# Patient Record
Sex: FEMALE | Race: WHITE | Employment: OTHER | URBAN - METROPOLITAN AREA
[De-identification: names, ages, dates, MRNs, and addresses within clinical notes are randomized per-mention and may not be internally consistent; named-entity substitution may affect disease eponyms.]

---

## 2017-04-05 ENCOUNTER — HOSPITAL ENCOUNTER (OUTPATIENT)
Age: 82
Discharge: HOME | End: 2017-04-05
Attending: FAMILY MEDICINE
Payer: MEDICARE

## 2017-04-05 ENCOUNTER — APPOINTMENT (OUTPATIENT)
Dept: GENERAL RADIOLOGY | Age: 82
End: 2017-04-05
Attending: FAMILY MEDICINE
Payer: MEDICARE

## 2017-04-05 VITALS
HEART RATE: 63 BPM | OXYGEN SATURATION: 100 % | WEIGHT: 165 LBS | SYSTOLIC BLOOD PRESSURE: 167 MMHG | HEIGHT: 61.5 IN | RESPIRATION RATE: 12 BRPM | TEMPERATURE: 98 F | DIASTOLIC BLOOD PRESSURE: 53 MMHG | BODY MASS INDEX: 30.75 KG/M2

## 2017-04-05 DIAGNOSIS — J06.9 VIRAL UPPER RESPIRATORY TRACT INFECTION: ICD-10-CM

## 2017-04-05 DIAGNOSIS — K62.3 RECTAL PROLAPSE: ICD-10-CM

## 2017-04-05 DIAGNOSIS — K59.00 CONSTIPATION, UNSPECIFIED CONSTIPATION TYPE: Primary | ICD-10-CM

## 2017-04-05 DIAGNOSIS — K64.4 EXTERNAL HEMORRHOID: ICD-10-CM

## 2017-04-05 DIAGNOSIS — R10.30 LOWER ABDOMINAL PAIN: ICD-10-CM

## 2017-04-05 PROCEDURE — 87086 URINE CULTURE/COLONY COUNT: CPT | Performed by: FAMILY MEDICINE

## 2017-04-05 PROCEDURE — 81002 URINALYSIS NONAUTO W/O SCOPE: CPT

## 2017-04-05 PROCEDURE — 99204 OFFICE O/P NEW MOD 45 MIN: CPT

## 2017-04-05 PROCEDURE — 71020 XR CHEST PA + LAT CHEST (CPT=71020): CPT

## 2017-04-05 RX ORDER — ATENOLOL 25 MG/1
25 TABLET ORAL DAILY
COMMUNITY

## 2017-04-05 RX ORDER — AMOXICILLIN AND CLAVULANATE POTASSIUM 875; 125 MG/1; MG/1
1 TABLET, FILM COATED ORAL 2 TIMES DAILY
Status: ON HOLD | COMMUNITY
End: 2018-09-21

## 2017-04-05 RX ORDER — COVID-19 ANTIGEN TEST
220 KIT MISCELLANEOUS
Status: ON HOLD | COMMUNITY
End: 2018-09-21

## 2017-04-05 RX ORDER — HYDROCHLOROTHIAZIDE 12.5 MG/1
12.5 CAPSULE, GELATIN COATED ORAL DAILY
Status: ON HOLD | COMMUNITY
End: 2018-09-28

## 2017-04-05 RX ORDER — AMLODIPINE BESYLATE 5 MG/1
5 TABLET ORAL DAILY
COMMUNITY

## 2017-04-05 NOTE — ED PROVIDER NOTES
[facility]    History   Patient presents with:  Abdomen/Flank Pain (GI/)    Stated Complaint: **Lower Abdm Pressure** DR Aide Cruz    HPI  68-year-old female patient who is a retired primary care [facility] follow-up. Past Medical History   Diagnosis Date   • Essential hypertension            Past Surgical History    MASTECTOMY,SIMPLE      OOPHORECTOMY         Medications : AmLODIPine Besylate 5 MG Oral Tab,  Take 5 mg by mouth daily.    hydrochlorothiazi exhibits no distension and no mass. There is no tenderness. There is no rebound and no guarding. Vertical scar from pubis to navel c/w ooporectomy. Genitourinary: Rectal exam shows external hemorrhoid and internal hemorrhoid.  Rectal exam shows no fiss being treated with Augmentin. Culture was sent with the consideration that she might have a false negative given being on antibiotics for 2 days.      XR CHEST PA + LAT CHEST (CPT=71020) (Final result) Result time: 04/05/17 19:26:03     Final result by Titi days        Medications Prescribed:  Discharge Medication List as of 4/5/2017  7:32 PM

## 2018-09-20 ENCOUNTER — APPOINTMENT (OUTPATIENT)
Dept: MRI IMAGING | Facility: HOSPITAL | Age: 83
DRG: 516 | End: 2018-09-20
Attending: Other
Payer: MEDICARE

## 2018-09-20 ENCOUNTER — HOSPITAL ENCOUNTER (INPATIENT)
Facility: HOSPITAL | Age: 83
LOS: 8 days | Discharge: NURSING, REHAB OR LONG-TERM CARE | DRG: 516 | End: 2018-09-28
Attending: INTERNAL MEDICINE | Admitting: INTERNAL MEDICINE
Payer: MEDICARE

## 2018-09-20 DIAGNOSIS — M48.062 SPINAL STENOSIS OF LUMBAR REGION WITH NEUROGENIC CLAUDICATION: Primary | ICD-10-CM

## 2018-09-20 PROBLEM — G62.9 PERIPHERAL POLYNEUROPATHY: Status: ACTIVE | Noted: 2018-09-20

## 2018-09-20 PROBLEM — R74.8 ELEVATED LIVER ENZYMES: Status: ACTIVE | Noted: 2018-09-20

## 2018-09-20 PROBLEM — M21.371 BILATERAL FOOT-DROP: Status: ACTIVE | Noted: 2018-09-20

## 2018-09-20 PROBLEM — D05.10 DCIS (DUCTAL CARCINOMA IN SITU): Status: ACTIVE | Noted: 2018-09-20

## 2018-09-20 PROBLEM — R29.898 WEAKNESS OF BOTH LOWER EXTREMITIES: Status: ACTIVE | Noted: 2018-09-20

## 2018-09-20 PROBLEM — M21.372 BILATERAL FOOT-DROP: Status: ACTIVE | Noted: 2018-09-20

## 2018-09-20 PROBLEM — M54.50 LUMBAR PAIN: Status: ACTIVE | Noted: 2018-09-20

## 2018-09-20 PROBLEM — D64.9 LOW HEMOGLOBIN: Status: ACTIVE | Noted: 2018-09-20

## 2018-09-20 PROCEDURE — 72148 MRI LUMBAR SPINE W/O DYE: CPT | Performed by: OTHER

## 2018-09-20 PROCEDURE — 99223 1ST HOSP IP/OBS HIGH 75: CPT | Performed by: OTHER

## 2018-09-20 PROCEDURE — 99223 1ST HOSP IP/OBS HIGH 75: CPT | Performed by: INTERNAL MEDICINE

## 2018-09-20 PROCEDURE — 72146 MRI CHEST SPINE W/O DYE: CPT | Performed by: OTHER

## 2018-09-20 RX ORDER — POLYETHYLENE GLYCOL 3350 17 G/17G
17 POWDER, FOR SOLUTION ORAL DAILY
Status: DISCONTINUED | OUTPATIENT
Start: 2018-09-20 | End: 2018-09-28

## 2018-09-20 RX ORDER — AMLODIPINE BESYLATE 5 MG/1
5 TABLET ORAL DAILY
Status: DISCONTINUED | OUTPATIENT
Start: 2018-09-20 | End: 2018-09-20

## 2018-09-20 RX ORDER — SODIUM CHLORIDE 9 MG/ML
INJECTION, SOLUTION INTRAVENOUS CONTINUOUS
Status: DISCONTINUED | OUTPATIENT
Start: 2018-09-20 | End: 2018-09-21

## 2018-09-20 RX ORDER — ACETAMINOPHEN 500 MG
500 TABLET ORAL EVERY 6 HOURS PRN
Status: DISCONTINUED | OUTPATIENT
Start: 2018-09-20 | End: 2018-09-28

## 2018-09-20 RX ORDER — HYDROCHLOROTHIAZIDE 12.5 MG/1
12.5 CAPSULE, GELATIN COATED ORAL DAILY
Status: DISCONTINUED | OUTPATIENT
Start: 2018-09-20 | End: 2018-09-25

## 2018-09-20 RX ORDER — ATENOLOL 25 MG/1
25 TABLET ORAL DAILY
Status: DISCONTINUED | OUTPATIENT
Start: 2018-09-20 | End: 2018-09-28

## 2018-09-20 RX ORDER — LORAZEPAM 2 MG/ML
1 INJECTION INTRAMUSCULAR ONCE
Status: COMPLETED | OUTPATIENT
Start: 2018-09-20 | End: 2018-09-20

## 2018-09-20 RX ORDER — TRAMADOL HYDROCHLORIDE 50 MG/1
50 TABLET ORAL EVERY 12 HOURS PRN
Status: DISCONTINUED | OUTPATIENT
Start: 2018-09-20 | End: 2018-09-26

## 2018-09-20 RX ORDER — AMLODIPINE BESYLATE 5 MG/1
5 TABLET ORAL 2 TIMES DAILY
Status: DISCONTINUED | OUTPATIENT
Start: 2018-09-20 | End: 2018-09-28

## 2018-09-20 NOTE — CONSULTS
[facility]    Report of Consultation    Brielle Perez Patient Status:  Inpatient    1925 MRN [id number]   [facility] 3SW-A Attending Yamila Orantes MD   Hosp Day # 0 PCP No primary care provider on file.      Date of Admissi tab 500 mg, 500 mg, Oral, Q6H PRN  •  AmLODIPine Besylate (NORVASC) tab 5 mg, 5 mg, Oral, Daily  •  atenolol (TENORMIN) tab 25 mg, 25 mg, Oral, Daily  •  hydrochlorothiazide (MICROZIDE) cap 12.5 mg, 12.5 mg, Oral, Daily  •  PEG 3350 (MIRALAX) powder packet ALT  394*   BILT  3.6*   TP  7.1       Imaging:  none    Assessment/Plan:  1. Progressive bilateral LE weakness and foot drop  2. Peripheral neuropathy  3. Left lumbar pain  4. Elevated LFT  5.  Hypertension      Possibilities include spinal stenosis with

## 2018-09-20 NOTE — H&P
History and Physical    Smita Goddard Patient Status:  Inpatient    1925 MRN [id number]   [facility] 3SW-A Attending Asuncion Estrada MD   Hosp Day # 0 PCP No primary care provider on file. Assessment and Plan:    1.   Leg difficult for her to get out of the bed today she was able to walk for about 15 feet and was not able to return on her walker. She does note that she gets anxious regarding her gait. She feels some unsteadiness in the left knee there is no prior issues. Negative for chest pain, palpitations, irregular heart beats, syncope, orthopnea, paroxysmal nocturnal dyspnea, lower extremity edema. See hypertension meds.     Gastrointestinal: May have had some heartburn short-term all check an EKG negative for dysphag joints. Knees look okay    G/U:  No  CVA Tenderness . NO  SUPRAPUBIC  TENDERNESS OR  FULLNESS     Lymph:  NEGATIVE      Hematology: . NO  BRUISING     Neurologic:  ALERT  -  ORIENTED  X  3  ,-   definite weakness both feet especially on dorsiflexion.   Keeley Vilchis

## 2018-09-20 NOTE — PLAN OF CARE
PAIN - ADULT    • Verbalizes/displays adequate comfort level or patient's stated pain goal Progressing        Patient/Family Goals    • Patient/Family Long Term Goal Progressing    • Patient/Family Short Term Goal Progressing            Patient was admitte

## 2018-09-20 NOTE — CONSULTS
Consult to Vascular Access for Midline placement. After discussion with primary RN, decision made to place Extended Dwell PIV. Patient tolerated procedure well.

## 2018-09-21 ENCOUNTER — APPOINTMENT (OUTPATIENT)
Dept: ULTRASOUND IMAGING | Facility: HOSPITAL | Age: 83
DRG: 516 | End: 2018-09-21
Attending: INTERNAL MEDICINE
Payer: MEDICARE

## 2018-09-21 PROBLEM — M48.062 SPINAL STENOSIS OF LUMBAR REGION WITH NEUROGENIC CLAUDICATION: Status: ACTIVE | Noted: 2018-09-21

## 2018-09-21 PROBLEM — N18.30 CKD (CHRONIC KIDNEY DISEASE) STAGE 3, GFR 30-59 ML/MIN (HCC): Status: ACTIVE | Noted: 2018-09-21

## 2018-09-21 PROCEDURE — 99233 SBSQ HOSP IP/OBS HIGH 50: CPT | Performed by: INTERNAL MEDICINE

## 2018-09-21 PROCEDURE — 99233 SBSQ HOSP IP/OBS HIGH 50: CPT | Performed by: OTHER

## 2018-09-21 PROCEDURE — 76700 US EXAM ABDOM COMPLETE: CPT | Performed by: INTERNAL MEDICINE

## 2018-09-21 RX ORDER — POLYETHYLENE GLYCOL 3350 17 G/17G
17 POWDER, FOR SOLUTION ORAL DAILY
Qty: 1 EACH | Refills: 0 | Status: SHIPPED | OUTPATIENT
Start: 2018-09-21

## 2018-09-21 RX ORDER — HEPARIN SODIUM 5000 [USP'U]/ML
5000 INJECTION, SOLUTION INTRAVENOUS; SUBCUTANEOUS EVERY 8 HOURS SCHEDULED
Status: DISPENSED | OUTPATIENT
Start: 2018-09-21 | End: 2018-09-25

## 2018-09-21 RX ORDER — HYDRALAZINE HYDROCHLORIDE 25 MG/1
25 TABLET, FILM COATED ORAL EVERY 8 HOURS SCHEDULED
Status: DISCONTINUED | OUTPATIENT
Start: 2018-09-21 | End: 2018-09-28

## 2018-09-21 NOTE — CONSULTS
[facility]  Neurosurgery Consult    Elida Angieangelia Patient Status:  Inpatient    1925 MRN [id number]   [facility] 3SW-A Attending Jon Arias MD   Hosp Day # 1 PCP No primary care provider on file.      6 [facility] hydrochlorothiazide 12.5 MG Oral Cap Take 12.5 mg by mouth daily. Disp:  Rfl:  9/19/2018 at 0900   atenolol 25 MG Oral Tab Take 25 mg by mouth daily.  Disp:  Rfl:  9/19/2018 at 0900   [DISCONTINUED] Amoxicillin-Pot Clavulanate 875-125 MG Oral Tab Take 1 t Biceps Brachioradialis Triceps Patella Achilles   Right 2/4 2/4 2/4 2/4 2/4   Left 2/4 2/4 2/4 2/4 2/4       DIAGNOSTIC DATA: Lab Results   Component Value Date    WBC 7.0 09/21/2018    HGB 9.2 09/21/2018    HCT 27.3 09/21/2018    .0 09/21/2018    C meds as ordered        Yancey Cogan, APN [employer]  9/21/2018, 12:34 PM     Neurosurgery Attending    I saw this patient with Yancey Cogan APN. I agree with her evaluation as recorded above the following additions and changes.

## 2018-09-21 NOTE — PLAN OF CARE
Restless and agitated after returning from MRI. A/o to place/time/situation, passed delirium screening. States pain to bilateral groin is severe. Dr. Reinier Cordoba paged; see orders. Repositioned and tramadol given. Bed alarm engaged. Will monitor.

## 2018-09-21 NOTE — PROGRESS NOTES
[id number] [facility] Neu[facility] Progress Note    Anaijenny Davis Patient Status:  Inpatient    1925 MRN [id number]   [facility] 3SW-A Attending Stacia Lesches, MD   Hosp Day # 1 PCP No primary care provider on file.          Subjective have seen and evaluated the patient with my housestaff or independently on the day of the APN/PA note and agree with the overall History/Assessement and Plan:   Specific comments/additions/deletions are as below (If none listed-I concur with overall assess

## 2018-09-21 NOTE — PROGRESS NOTES
[facility] Note:  Renal Dose Adjustment for Tramadol Coretha Mark)    Jayson Murguia has been prescribed Tramadol (ULTRAM) 50 mg orally every 4 hours as needed for pain. Estimated Creatinine Clearance: 17.7 mL/min (A) (based on SCr of 1.5 mg/dL (H)).

## 2018-09-21 NOTE — PLAN OF CARE
MUSCULOSKELETAL - ADULT    • Return mobility to safest level of function Progressing        PAIN - ADULT    • Verbalizes/displays adequate comfort level or patient's stated pain goal Progressing        Patient/Family Goals    • Patient/Family [address] [facility]

## 2018-09-21 NOTE — PHYSICAL THERAPY NOTE
PHYSICAL THERAPY EVALUATION - INPATIENT     Room Number: 358/358-A  Evaluation Date: 9/21/2018  Type of Evaluation: Initial  Physician Order: PT Eval and Treat    Presenting Problem: Bilateral LEs weakness  Reason for Therapy: Mobility Dysfunction an caregiver who is with patient during week days. Patient  Used to be able to walk with RW. Does not drive anymore. Patient reported few falls in past 12 weeks. SUBJECTIVE  \"I do not ware closed toes shoes. \" Patient was participatory.  Resistive to recomm adjusting bedclothes, sheets and blankets)?: A Little   -   Sitting down on and standing up from a chair with arms (e.g., wheelchair, bedside commode, etc.): A Little   -   Moving from lying on back to sitting on the side of the bed?: A Little   How much h addressed;SCDs in place; Family present; Discussed recommendations with /(Daughter in law was present outside room)    ASSESSMENT   Patient is a 80year old female admitted on 9/20/2018 for Bilateral LEs weakness - foot drop.   Pertin 5      CURRENT GOALS    Goal #1 Patient is able to demonstrate supine - sit EOB @ level: supervision     Goal #2 Patient is able to demonstrate transfers EOB to/from Chair/Wheelchair at assistance level: supervision     Goal #3 Patient is able to ambulate

## 2018-09-21 NOTE — PROGRESS NOTES
[facility]  Progress Note    Bhupinder Jackson Patient Status:  Inpatient    1925 MRN [id number]   [facility] 3SW-A Attending Silvio Barrera MD   Hosp Day # 1 PCP No primary care provider on file.        Assessment and Plan:  P dysuria. No hematuria    Neuro: Has not been ambulated yet will get physical therapy to do that. No headaches, no confusion. no  Falls  Oriented      MS:  Denies joint  Pain left back pain seems okay urinalysis contaminated specimen certainly no hematuria --   2.5*   ALKPHO  696*   --   636*   BILT  3.6*   --   2.0   TP  7.1   --   6.6   AST  261*   --   170*   ALT  394*   --   303*   T4F  1.4   --    --    TSH  2.020   --    --    ESRML  85*   --    --    B12   --   807   --        Imaging:  Mri Thoracic+l

## 2018-09-22 ENCOUNTER — APPOINTMENT (OUTPATIENT)
Dept: CT IMAGING | Facility: HOSPITAL | Age: 83
DRG: 516 | End: 2018-09-22
Attending: Other
Payer: MEDICARE

## 2018-09-22 ENCOUNTER — APPOINTMENT (OUTPATIENT)
Dept: CT IMAGING | Facility: HOSPITAL | Age: 83
DRG: 516 | End: 2018-09-22
Attending: INTERNAL MEDICINE
Payer: MEDICARE

## 2018-09-22 PROCEDURE — 72131 CT LUMBAR SPINE W/O DYE: CPT | Performed by: OTHER

## 2018-09-22 PROCEDURE — 74176 CT ABD & PELVIS W/O CONTRAST: CPT | Performed by: INTERNAL MEDICINE

## 2018-09-22 PROCEDURE — 99233 SBSQ HOSP IP/OBS HIGH 50: CPT | Performed by: HOSPITALIST

## 2018-09-22 PROCEDURE — 99233 SBSQ HOSP IP/OBS HIGH 50: CPT | Performed by: OTHER

## 2018-09-22 RX ORDER — PANTOPRAZOLE SODIUM 40 MG/1
40 TABLET, DELAYED RELEASE ORAL
Status: DISCONTINUED | OUTPATIENT
Start: 2018-09-22 | End: 2018-09-28

## 2018-09-22 NOTE — PROGRESS NOTES
[facility]  Progress Note     Brielle Perez Patient Status:  Inpatient    1925 MRN [id number]   [facility] 3SW-A Attending Giuliano Mondragon MD   Hosp Day # 2 PCP No primary care provider on file.      Chief Complaint: Georgie Sabeny Oral Daily   • hydrochlorothiazide  12.5 mg Oral Daily   • PEG 3350  17 g Oral Daily   • AmLODIPine Besylate  5 mg Oral BID     ASSESSMENT / PLAN:   1. B/l lower extremity weakness- progressing. Suspecting structural in nature  1.  [address] Neurology and

## 2018-09-22 NOTE — PHYSICAL THERAPY NOTE
PHYSICAL THERAPY TREATMENT NOTE - INPATIENT    Room Number: 358/358-A     Session: 1   Number of Visits to Meet Established Goals: 5    Presenting Problem: Bilateral LEs weakness    History related to current admission: Patient is 80years old female - Re Static Sitting: Good  Dynamic Sitting: Fair +           Static Standing: Poor  Dynamic Standing: Poor -    ACTIVITY TOLERANCE  Room air    AM-PAC '6-Clicks' INPATIENT SHORT FORM - BASIC MOBILITY  How much difficulty does the patient current steppage gait due to merlin foot drop, excessive hip flex to clear toes, slow danny, and slightly forward flexed posture.      THERAPEUTIC EXERCISES  Lower Extremity Ankle pumps  Hip AB/AD  Heel slides  SAQ     Upper Extremity      Position Supine     Repeti minimum assistance      Goal #4     Goal #5     Goal #6     Goal Comments: Goals established on 9/21/2018.  Ongoing

## 2018-09-22 NOTE — CONSULTS
[facility]  Report of Consultation    Jessica Coats Patient Status:  Inpatient    1925 MRN [id number]   [facility] 3SW-A Attending Tang Pierre MD   Hosp Day # 2 PCP No primary care provider on file.      Reason for Consultati drink alcohol.     Allergies:  No Known Allergies    Medications:    Current Facility-Administered Medications:  hydrALAzine HCl (APRESOLINE) tab 25 mg 25 mg Oral Q8H [address]   Heparin Sodium (Porcine) 5000 UNIT/ML injection 5,000 Units 5,000 Units Subcutaneous (1.549 m)   Wt 160 lb 4.4 oz (72.7 kg)   SpO2 98%   BMI 30.28 kg/m²     GENERAL: well developed, well nourished, in no apparent distress  HEENT: normocephalic, mucous membranes moist.  EYES: eomi   NECK:non tender, supple  RESPIRATORY: clear to percussion enzymes. FINDINGS:    LIVER:  Normal size and echogenicity. No significant masses. BILIARY:  Normal appearing intrahepatic ducts, and common bile duct. Common bile duct diameter is 6 mm. 1.2 cm gallstone in the gallbladder neck is noted.   There of morbidity/mortality if diagnoses was delayed balanced against risks of further investigation    --the patient demonstrated understanding of the results and recommendations and all of their questions and concerns were addressed and were agreeable to the

## 2018-09-22 NOTE — PROGRESS NOTES
[facility]  Neurology  Notes  Affiliated with [facility]  2018, 11:42 AM     Michel Mireles Patient Status:  Inpatient    1925 MRN [id number]   [facility] 3SW-A Attending Enoc Abad MD She gives a fairly good history that is consistent with lumbar radiculopathy with neurogenic claudication . The question is what levels above are giving her the problem,   The thoracic stenosis - clinically does not have incontinence or UMN signs.      The

## 2018-09-23 PROBLEM — K80.70 CHOLELITHIASIS WITH CHOLEDOCHOLITHIASIS: Status: ACTIVE | Noted: 2018-09-23

## 2018-09-23 PROCEDURE — 99232 SBSQ HOSP IP/OBS MODERATE 35: CPT | Performed by: HOSPITALIST

## 2018-09-23 NOTE — PHYSICAL THERAPY NOTE
PHYSICAL THERAPY TREATMENT NOTE - INPATIENT    Room Number: 358/358-A     Session: 1   Number of Visits to Meet Established Goals: 5    Presenting Problem: Bilateral LEs weakness    History related to current admission: Patient is 80years old female - Re +           Static Standing: Poor  Dynamic Standing: Poor -    ACTIVITY TOLERANCE  Room air    AM-PAC '6-Clicks' INPATIENT SHORT FORM - BASIC MOBILITY  How much difficulty does the patient currently have. ..  -   Turning over in bed (including adjusting bed will benefit from continued inpatient physical therapy to address above issues so that patient may achieve highest functional mobility level.   Results of the AM-PAC \"6 clicks\" Inpatient Daily Mobility Short Form for the patient is 57.7% degree of basic m

## 2018-09-23 NOTE — PROGRESS NOTES
[id number] [facility] Neu[facility] Progress Note    Smita Goddard Patient Status:  Inpatient    1925 MRN [id number]   [facility] 3SW-A Attending Gerardo Louie MD   Hosp Day # 3 PCP No primary care provider on file.      CC: Leg weakness the T12 vertebral body measuring 1.5cm. Marked deformity upon the thecal sac and nerve root noted.      Assessment/Plan:  · Leg weakness- consistent with lumbar radiculopathy with neurogenic claudication  · Right foot weakness- likely related to L3-4 and L of treatment - DNR    (   ) Acute neurologic changes    (   ) Others: New Rx    (   ) ICU >35 minutes total time   Available within moments call in hospital  PROCEDURE DONE    (   ) see notes

## 2018-09-23 NOTE — PROGRESS NOTES
[facility]  Progress Note    Jose Gee Patient Status:  Inpatient    1925 MRN [id number]   [facility] 3SW-A Attending Hoa Peña MD   Hosp Day # 3 PCP No primary care provider on file.      Subjective:  Jose Gee (72.7 kg)   SpO2 95%   BMI 30.28 kg/m²   General appearance: alert, appears stated age and cooperative  Lungs: clear to auscultation bilaterally  Heart: S1, S2 normal, no murmur, click, rub or gallop, regular rate and rhythm  Abdomen: soft, non-tender; bow calcification in the distal common duct without significant bile duct dilatation. PANCREAS:  No lesion, fluid collection, ductal dilatation, or atrophy. SPLEEN:  No enlargement or focal lesion.     KIDNEYS:  Horseshoe configuration with a thin fibrous b Assessment and Plan:  Patient Active Problem List:     Weakness of both lower extremities     Peripheral polyneuropathy     Elevated liver enzymes     Low hemoglobin     DCIS (ductal carcinoma in situ)     CKD (chronic kidney disease) stage 3,

## 2018-09-23 NOTE — PLAN OF CARE
Seen by Dr. Aryan Bradley recommended, states earlier that she will talk to family & decide on options, NPO after midnoc,refused scd's & Heparin earlier.

## 2018-09-23 NOTE — PROGRESS NOTES
[facility]  Progress Note     Jose Gee Patient Status:  Inpatient    1925 MRN [id number]   [facility] 3SW-A Attending Hoa Peña MD   Hosp Day # 3 PCP No primary care provider on file.      Chief Complaint: Hilda Holcomb last 168 hours. Imaging: Imaging data reviewed in Epic.   Medications:   • Pantoprazole Sodium  40 mg Oral QAM AC   • hydrALAzine HCl  25 mg Oral Q8H [facility]   • Heparin Sodium (Porcine)  5,000 Units Subcutaneous Q8H [facility]   • atenolol  25 mg Oral Daily   • h

## 2018-09-24 ENCOUNTER — APPOINTMENT (OUTPATIENT)
Dept: GENERAL RADIOLOGY | Facility: HOSPITAL | Age: 83
DRG: 516 | End: 2018-09-24
Attending: INTERNAL MEDICINE
Payer: MEDICARE

## 2018-09-24 PROCEDURE — BF10YZZ FLUOROSCOPY OF BILE DUCTS USING OTHER CONTRAST: ICD-10-PCS | Performed by: INTERNAL MEDICINE

## 2018-09-24 PROCEDURE — 99232 SBSQ HOSP IP/OBS MODERATE 35: CPT | Performed by: INTERNAL MEDICINE

## 2018-09-24 PROCEDURE — 99233 SBSQ HOSP IP/OBS HIGH 50: CPT | Performed by: OTHER

## 2018-09-24 PROCEDURE — 0FC98ZZ EXTIRPATION OF MATTER FROM COMMON BILE DUCT, VIA NATURAL OR ARTIFICIAL OPENING ENDOSCOPIC: ICD-10-PCS | Performed by: INTERNAL MEDICINE

## 2018-09-24 PROCEDURE — 74328 X-RAY BILE DUCT ENDOSCOPY: CPT | Performed by: INTERNAL MEDICINE

## 2018-09-24 RX ORDER — NALOXONE HYDROCHLORIDE 0.4 MG/ML
80 INJECTION, SOLUTION INTRAMUSCULAR; INTRAVENOUS; SUBCUTANEOUS AS NEEDED
Status: DISCONTINUED | OUTPATIENT
Start: 2018-09-24 | End: 2018-09-24 | Stop reason: HOSPADM

## 2018-09-24 RX ORDER — SODIUM CHLORIDE, SODIUM LACTATE, POTASSIUM CHLORIDE, CALCIUM CHLORIDE 600; 310; 30; 20 MG/100ML; MG/100ML; MG/100ML; MG/100ML
INJECTION, SOLUTION INTRAVENOUS CONTINUOUS
Status: DISCONTINUED | OUTPATIENT
Start: 2018-09-24 | End: 2018-09-25

## 2018-09-24 RX ORDER — MORPHINE SULFATE 4 MG/ML
INJECTION, SOLUTION INTRAMUSCULAR; INTRAVENOUS
Status: COMPLETED
Start: 2018-09-24 | End: 2018-09-24

## 2018-09-24 RX ORDER — MORPHINE SULFATE 4 MG/ML
1 INJECTION, SOLUTION INTRAMUSCULAR; INTRAVENOUS ONCE
Status: DISCONTINUED | OUTPATIENT
Start: 2018-09-24 | End: 2018-09-24 | Stop reason: HOSPADM

## 2018-09-24 NOTE — PLAN OF CARE
Back to floor from ERCP. Pain moderate to back. Pt comfortable at this time and sleeping. Clear liquid diet ordered.  Son at bedside upon arrival.

## 2018-09-24 NOTE — PROGRESS NOTES
[facility]  Neurosurgery Progress Note    Crystal Foreman Patient Status:  Inpatient    1925 MRN [id number]   [facility] 3SW-A Attending Chemo Toussaint MD   Hosp Day # 4 PCP No primary care provider on file.      Subjective:  Pt

## 2018-09-24 NOTE — PROGRESS NOTES
[id number] [facility] Neurology Progress Note    [facility] Fee Patient Status:  Inpatient    1925 MRN [id number]   [facility] 3SW-A Attending Laxmi Atwood MD   Hosp Day # 4 PCP No primary care provider on file.          Subjective claudication  Right foot weakness- likely related to L3-4 and L4-5 severe canal stenosis on CT of lumbar spine  Renal insufficiency   Elevated LFTs    Plan:  GI following, plan for ERCP today (9/24)  NS following, likely decompressive surgery tomorrow (9/2 DF, PF,  on R and 2/5 on L foot DF and PF; mild weakness in hip flexion L side 4/5 and 4/5 knee flexion / extension; otherwise, 5/5 strength throughout, tone normal  Sensory: intact to light touch throughout   Coord: FNF intact with no tremor or dysmetria Component Value Date    K 4.3 09/24/2018    K 4.2 09/22/2018    K 4.1 09/21/2018     Lab Results   Component Value Date    BUN 24 (H) 09/24/2018    BUN 28 (H) 09/22/2018    BUN 23 (H) 09/21/2018     Lab Results   Component Value Date    CREATSERUM 1.30 (

## 2018-09-24 NOTE — PROGRESS NOTES
[facility]  Progress Note    Tere Carrillo Patient Status:  Inpatient    1925 MRN [id number]   [facility] 3SW-A Attending Shanon Rios MD   Hosp Day # 4 PCP No primary care provider on file.        Assessment and Plan: hematuria    Neuro: No change no headaches, no confusion. no  Falls  Oriented      MS: No change denies joint  Pain     Skin no breakdown, no phlebitis or cellutlitis    Vascular  -  No  Calf  Swelling      Psych: appropriate      OBJECTIVE:    Intake/Outpu 9. 6   ALB  2.7*   --   2.5*  2.6*  2.9*  2.7*   ALKPHO  696*   --   636*  615*  680*  559*   BILT  3.6*   --   2.0  1.7  1.7  1.4   TP  7.1   --   6.6  6.6  7.5  6.8   AST  261*   --   170*  149*  167*  135*   ALT  394*   --   303*  255*  265*  215*   INR intraductal stone and no significant bile duct dilatation. 2. Cholelithiasis. 3. Uncomplicated colonic diverticula. 4. Low-attenuation right hepatic lesion is nonspecific, metastasis and cyst are included in the differential considerations.   Repeat dedicat

## 2018-09-24 NOTE — OPERATIVE REPORT
OPERATIVE REPORT   PATIENT NAME: Spencer Brown  MRN: [id number]  DATE OF OPERATION: 9/20/2018 - 9/24/2018  PREOPERATIVE DIAGNOSIS:   1. Elevated liver enzymes and a CT findings suggestive of choledocholithiasis.   Patient is scheduled for endoscopic ultras content    Next Olympus therapeutic duodenal scope was introduced under direct visualization the esophagus passed into the stomach and second portion the duodenum.   The major papilla was visualized and appeared to be inside a small periampullary diverticul

## 2018-09-24 NOTE — PHYSICAL THERAPY NOTE
Attempted to see pt twice today. On first attempt, pt with elevated /44, RN requested to return later after BP medication was given to pt.    On second attempt, pt refused PT stating \" I just got back in bed and I am waiting for transport for procedu

## 2018-09-25 ENCOUNTER — ANESTHESIA EVENT (OUTPATIENT)
Dept: SURGERY | Facility: HOSPITAL | Age: 83
End: 2018-09-25

## 2018-09-25 PROCEDURE — 99232 SBSQ HOSP IP/OBS MODERATE 35: CPT | Performed by: INTERNAL MEDICINE

## 2018-09-25 PROCEDURE — 99233 SBSQ HOSP IP/OBS HIGH 50: CPT | Performed by: OTHER

## 2018-09-25 NOTE — PROGRESS NOTES
[id number] [facility] Progress Note    Mylene Morrow Patient Status:  Inpatient    1925 MRN [id number]   [facility] 3SW-A Attending Maximus Romero MD   Hosp Day # 5 PCP No primary care provider on file.          Subjective disease and lumbar stenosis severe L3/4 and L4/5  Renal insufficiency      Plan:  GI following, s/p ERCP on 9/24  NS following, planning for surgery on 9/26  PT/OT    Dr. Lupillo Gilmore to follow.     TITI Sebastian  [facility]  9/25/2018, otherwise, 5/5 strength throughout, tone normal  Sensory: intact to light touch throughout   Coord: FNF intact with no tremor or dysmetria  Romberg: deferred  Gait: deferred        Imaging: no new imaging; prior imaging as noted above    Labs:   BMP:   No [place]  Pager [phone]  9/25/2018

## 2018-09-25 NOTE — PLAN OF CARE
MUSCULOSKELETAL - ADULT    • Return mobility to safest level of function Not Progressing          Impaired Functional Mobility    • Achieve highest/safest level of mobility/gait Progressing        PAIN - ADULT    • Verbalizes/displays adequate comfort leve

## 2018-09-25 NOTE — PROGRESS NOTES
[facility]  Neurosurgery Progress Note    Michel Pae Patient Status:  Inpatient    1925 MRN [id number]   [facility] 3SW-A Attending Geraldine Montesinos MD   Hosp Day # 5 PCP No primary care provider on file.      Subjective:  Pt

## 2018-09-25 NOTE — PROGRESS NOTES
[facility]  Progress Note    Eric Wren Patient Status:  Inpatient    1925 MRN [id number]   [facility] 3SW-A Attending Pura Parks MD   Hosp Day # 5 PCP No primary care provider on file.        Assessment and Plan:  Prin °C) (Oral)   Resp 18   Ht 61\"   Wt 160 lb 4.4 oz   SpO2 96%   BMI 30.28 kg/m²     Exam:   Gen: alert and oriented times 3, no acute distress    Heent - normal vision, hearing    CV: Regular rate and rhythm no murmur  no JVD    Pulm: Lungs clear, normal re Imaging:  Ct Spine Lumbar (cpt=72131)    Result Date: 9/22/2018  CONCLUSION:  1. There is severe degenerative disc disease T11-12 through L5-S1 with severe canal stenosis L3-4 and L4-5. Milder stenosis at all remaining levels.   Severe multiple level postop. Questions/concerns were discussed with patient and/or family by bedside.       Nella Morales MD  9/25/2018  7:00 AM

## 2018-09-25 NOTE — PROGRESS NOTES
[facility]  Progress Note    Serjio Syeda Patient Status:  Inpatient    1925 MRN [id number]   [facility] 3SW-A Attending Cristiane Hill MD   Hosp Day # 5 PCP No primary care provider on file.      Subjective:  Serjio Landa

## 2018-09-25 NOTE — ANESTHESIA PREPROCEDURE EVALUATION
PRE-OP EVALUATION    Patient Name: Aileen Savage    Pre-op Diagnosis: INPT    Procedure(s):  BILATERAL LUMBAR 3 - LUMBAR 4, LUMBAR 4 - LUMBAR 5 MICROFORAMINOTOMY    Surgeon(s) and Role:     * Regulo Vang MD - Primary    Pre-op vitals reviewed.   Te (+) arthritis       Pulmonary                           Neuro/Psych                 (+) neuromuscular disease             S/p ERCP this admit for cholelithiasis  Complains of progressive LE weakness      Past Surgical History:  No date: L without further questions.     Plan/risks discussed with: patient                Present on Admission:  • Weakness of both lower extremities  • Peripheral polyneuropathy  • Elevated liver enzymes  • Low hemoglobin  • DCIS (ductal carcinoma in situ)  • CKD (

## 2018-09-26 ENCOUNTER — APPOINTMENT (OUTPATIENT)
Dept: GENERAL RADIOLOGY | Facility: HOSPITAL | Age: 83
DRG: 516 | End: 2018-09-26
Attending: NEUROLOGICAL SURGERY
Payer: MEDICARE

## 2018-09-26 ENCOUNTER — ANESTHESIA (OUTPATIENT)
Dept: SURGERY | Facility: HOSPITAL | Age: 83
End: 2018-09-26

## 2018-09-26 PROCEDURE — 72020 X-RAY EXAM OF SPINE 1 VIEW: CPT | Performed by: NEUROLOGICAL SURGERY

## 2018-09-26 PROCEDURE — 01NB0ZZ RELEASE LUMBAR NERVE, OPEN APPROACH: ICD-10-PCS | Performed by: NEUROLOGICAL SURGERY

## 2018-09-26 PROCEDURE — 99233 SBSQ HOSP IP/OBS HIGH 50: CPT | Performed by: OTHER

## 2018-09-26 PROCEDURE — 99232 SBSQ HOSP IP/OBS MODERATE 35: CPT | Performed by: INTERNAL MEDICINE

## 2018-09-26 RX ORDER — SODIUM CHLORIDE, SODIUM LACTATE, POTASSIUM CHLORIDE, CALCIUM CHLORIDE 600; 310; 30; 20 MG/100ML; MG/100ML; MG/100ML; MG/100ML
INJECTION, SOLUTION INTRAVENOUS CONTINUOUS
Status: DISCONTINUED | OUTPATIENT
Start: 2018-09-26 | End: 2018-09-26 | Stop reason: HOSPADM

## 2018-09-26 RX ORDER — BUPIVACAINE HYDROCHLORIDE AND EPINEPHRINE 5; 5 MG/ML; UG/ML
INJECTION, SOLUTION EPIDURAL; INTRACAUDAL; PERINEURAL AS NEEDED
Status: DISCONTINUED | OUTPATIENT
Start: 2018-09-26 | End: 2018-09-26 | Stop reason: HOSPADM

## 2018-09-26 RX ORDER — BACITRACIN 50000 [USP'U]/1
INJECTION, POWDER, LYOPHILIZED, FOR SOLUTION INTRAMUSCULAR AS NEEDED
Status: DISCONTINUED | OUTPATIENT
Start: 2018-09-26 | End: 2018-09-26 | Stop reason: HOSPADM

## 2018-09-26 RX ORDER — SODIUM CHLORIDE AND POTASSIUM CHLORIDE .9; .15 G/100ML; G/100ML
SOLUTION INTRAVENOUS CONTINUOUS
Status: DISCONTINUED | OUTPATIENT
Start: 2018-09-26 | End: 2018-09-28

## 2018-09-26 RX ORDER — METOCLOPRAMIDE HYDROCHLORIDE 5 MG/ML
5 INJECTION INTRAMUSCULAR; INTRAVENOUS EVERY 6 HOURS PRN
Status: DISCONTINUED | OUTPATIENT
Start: 2018-09-26 | End: 2018-09-28

## 2018-09-26 RX ORDER — MORPHINE SULFATE 4 MG/ML
4 INJECTION, SOLUTION INTRAMUSCULAR; INTRAVENOUS EVERY 2 HOUR PRN
Status: DISCONTINUED | OUTPATIENT
Start: 2018-09-26 | End: 2018-09-28

## 2018-09-26 RX ORDER — DIPHENHYDRAMINE HCL 25 MG
25 CAPSULE ORAL EVERY 4 HOURS PRN
Status: DISCONTINUED | OUTPATIENT
Start: 2018-09-26 | End: 2018-09-28

## 2018-09-26 RX ORDER — ACETAMINOPHEN 10 MG/ML
INJECTION, SOLUTION INTRAVENOUS
Status: COMPLETED
Start: 2018-09-26 | End: 2018-09-26

## 2018-09-26 RX ORDER — ONDANSETRON 2 MG/ML
4 INJECTION INTRAMUSCULAR; INTRAVENOUS AS NEEDED
Status: DISCONTINUED | OUTPATIENT
Start: 2018-09-26 | End: 2018-09-26 | Stop reason: HOSPADM

## 2018-09-26 RX ORDER — SENNOSIDES 8.6 MG
17.2 TABLET ORAL NIGHTLY
Status: DISCONTINUED | OUTPATIENT
Start: 2018-09-26 | End: 2018-09-28

## 2018-09-26 RX ORDER — CEFAZOLIN SODIUM/WATER 2 G/20 ML
2 SYRINGE (ML) INTRAVENOUS EVERY 8 HOURS
Status: COMPLETED | OUTPATIENT
Start: 2018-09-27 | End: 2018-09-27

## 2018-09-26 RX ORDER — ACETAMINOPHEN 10 MG/ML
1000 INJECTION, SOLUTION INTRAVENOUS ONCE
Status: COMPLETED | OUTPATIENT
Start: 2018-09-26 | End: 2018-09-26

## 2018-09-26 RX ORDER — MORPHINE SULFATE 4 MG/ML
1 INJECTION, SOLUTION INTRAMUSCULAR; INTRAVENOUS EVERY 2 HOUR PRN
Status: DISCONTINUED | OUTPATIENT
Start: 2018-09-26 | End: 2018-09-28

## 2018-09-26 RX ORDER — CEFAZOLIN SODIUM 1 G/3ML
INJECTION, POWDER, FOR SOLUTION INTRAMUSCULAR; INTRAVENOUS
Status: DISCONTINUED | OUTPATIENT
Start: 2018-09-26 | End: 2018-09-26 | Stop reason: HOSPADM

## 2018-09-26 RX ORDER — TRAMADOL HYDROCHLORIDE 50 MG/1
50 TABLET ORAL EVERY 12 HOURS PRN
Status: DISCONTINUED | OUTPATIENT
Start: 2018-09-26 | End: 2018-09-28

## 2018-09-26 RX ORDER — MORPHINE SULFATE 4 MG/ML
2 INJECTION, SOLUTION INTRAMUSCULAR; INTRAVENOUS EVERY 5 MIN PRN
Status: DISCONTINUED | OUTPATIENT
Start: 2018-09-26 | End: 2018-09-26 | Stop reason: HOSPADM

## 2018-09-26 RX ORDER — METOCLOPRAMIDE HYDROCHLORIDE 5 MG/ML
10 INJECTION INTRAMUSCULAR; INTRAVENOUS AS NEEDED
Status: DISCONTINUED | OUTPATIENT
Start: 2018-09-26 | End: 2018-09-26 | Stop reason: HOSPADM

## 2018-09-26 RX ORDER — DIPHENHYDRAMINE HYDROCHLORIDE 50 MG/ML
25 INJECTION INTRAMUSCULAR; INTRAVENOUS EVERY 4 HOURS PRN
Status: DISCONTINUED | OUTPATIENT
Start: 2018-09-26 | End: 2018-09-28

## 2018-09-26 RX ORDER — MORPHINE SULFATE 4 MG/ML
2 INJECTION, SOLUTION INTRAMUSCULAR; INTRAVENOUS EVERY 2 HOUR PRN
Status: DISCONTINUED | OUTPATIENT
Start: 2018-09-26 | End: 2018-09-28

## 2018-09-26 RX ORDER — CEFAZOLIN SODIUM 1 G/3ML
INJECTION, POWDER, FOR SOLUTION INTRAMUSCULAR; INTRAVENOUS
Status: DISCONTINUED | OUTPATIENT
Start: 2018-09-26 | End: 2018-09-26

## 2018-09-26 RX ORDER — NALOXONE HYDROCHLORIDE 0.4 MG/ML
80 INJECTION, SOLUTION INTRAMUSCULAR; INTRAVENOUS; SUBCUTANEOUS AS NEEDED
Status: DISCONTINUED | OUTPATIENT
Start: 2018-09-26 | End: 2018-09-26 | Stop reason: HOSPADM

## 2018-09-26 RX ORDER — TIZANIDINE 2 MG/1
2 TABLET ORAL 3 TIMES DAILY PRN
Status: DISCONTINUED | OUTPATIENT
Start: 2018-09-26 | End: 2018-09-28

## 2018-09-26 RX ORDER — ONDANSETRON 2 MG/ML
4 INJECTION INTRAMUSCULAR; INTRAVENOUS EVERY 4 HOURS PRN
Status: ACTIVE | OUTPATIENT
Start: 2018-09-26 | End: 2018-09-27

## 2018-09-26 RX ORDER — MORPHINE SULFATE 4 MG/ML
INJECTION, SOLUTION INTRAMUSCULAR; INTRAVENOUS
Status: COMPLETED
Start: 2018-09-26 | End: 2018-09-26

## 2018-09-26 NOTE — PLAN OF CARE
NPO for surgery. Hn=926/54-agreed to take Amlodipine & Atenolol. CHG bath done. OR scheduled at 1215, needs consent to be signed,updated on plan of care.

## 2018-09-26 NOTE — PROGRESS NOTES
[facility]  Cardiology Progress Note    Subjective:  No chest pain or shortness of breath. Feels \"thirsty\". Awaiting surgery.     Objective:  BP (!) 162/57   Pulse 58   Temp 98.1 °F (36.7 °C) (Oral)   Resp 18   Ht 5' 1\" (1.549 m)   Wt 160 lb 4.4 o APN  9/26/2018  12:50 PM

## 2018-09-26 NOTE — CONSULTS
[facility]    Cardiology Consultation    Serjio Landa Patient Status:  Inpatient    1925 MRN [id number]   [facility] 3SW-A Attending Cristiane Hill MD   Hosp Day # 5 PCP No primary care provider on file.      2018  Delmis (NORVASC) tab 5 mg, 5 mg, Oral, BID  •  TraMADol HCl (ULTRAM) tab 50 mg, 50 mg, Oral, Q12H PRN    Review of Systems:  GENERAL HEALTH: feels well otherwise  SKIN: denies any unusual skin lesions or rashes  RESPIRATORY: denies shortness of breath with exerti stenosis of lumbar region with neurogenic claudication     Cholelithiasis with choledocholithiasis           Recommendations:  1. Pre op evaluation  - patient with poor exercise capacity no prior documented cardiac hx, normal EKG no high risk features.  Nor

## 2018-09-26 NOTE — PROGRESS NOTES
[facility] [facility] Neurology Progress Note    Vickiery Parks Patient Status:  Inpatient    1925 MRN [id number]   [facility] 3SW-A Attending Christine Martin MD   Hosp Day # 6 PCP No primary care provider on file.          Subjective intact to light touch throughout   Coord: FNF intact with no tremor or dysmetria  Romberg: deferred  Gait: deferred      Imaging:  no new neuro studies  9/22/2018 CT Lumbar Spine  Severe degenerative disc disease T11-12 and through L5-S1 with severe canal 265 (H) 09/23/2018          Assessment/Plan:   Leg weakness with foot drop bilaterally: likely secondary to severe degenerative disc disease and lumbar stenosis severe L3/4 and L4/5     Patient s/p ERCP with improving LFTs -      Plan for lumbar decompress

## 2018-09-26 NOTE — PHYSICAL THERAPY NOTE
Patient was scheduled for PT session in am, attempted to see pt at 11:30 but pt states she is feeling nervous about the surgery and unable to focus on mobility at this time. Pt will need new orders for PT eval after surgery.

## 2018-09-26 NOTE — PROGRESS NOTES
[facility]  Progress Note    Smita Goddard Patient Status:  Inpatient    1925 MRN [id number]   [facility] 3SW-A Attending Asuncion Estrada MD   Hosp Day # 6 PCP No primary care provider on file.        Assessment and Plan:  Prin appropriate      OBJECTIVE:    Intake/Output:    Intake/Output Summary (Last 24 hours) at 9/26/2018 0732  Last data filed at 9/25/2018 2129  Gross per 24 hour   Intake 480 ml   Output —   Net 480 ml       Wt Readings from Last 3 Encounters:  09/20/18 : 160 9. 2   ALB  2.7*   --    < >  2.6*  2.9*  2.7*  2.6*   ALKPHO  696*   --    < >  615*  680*  559*  459*   BILT  3.6*   --    < >  1.7  1.7  1.4  1.1   TP  7.1   --    < >  6.6  7.5  6.8  6.6   AST  261*   --    < >  149*  167*  135*  81*   ALT  394*   --

## 2018-09-26 NOTE — PROGRESS NOTES
[facility]  Neurosurgery Progress Note    Preoperative Note    Chetleonel Rosenbergjaime Patient Status:  Inpatient    1925 MRN [id number]   [facility] 3SW-A Attending Debbie Larose MD   Hosp Day # 6 PCP No primary care provider on file well as almost no movement in any direction of her toes. Plan: L3-4 and L4-5 bilateral microforaminotomies to spare the posterior elements and try and maintain stability. We will watch her severe thoracic stenosis conservatively for now. Jesús Cohen.

## 2018-09-26 NOTE — PROGRESS NOTES
Cardiology Note    Attempted to visit this later afternoon/early evening but still in OR. Will follow-up tomorrow.     Natalie Palacios MD

## 2018-09-27 PROCEDURE — 99291 CRITICAL CARE FIRST HOUR: CPT | Performed by: OTHER

## 2018-09-27 PROCEDURE — 99232 SBSQ HOSP IP/OBS MODERATE 35: CPT | Performed by: INTERNAL MEDICINE

## 2018-09-27 NOTE — BRIEF OP NOTE
Pre-Operative Diagnosis: Lumbar Spinal Stenosis     Post-Operative Diagnosis: Lumbar Spinal Stenosis      Procedure Performed:   Procedure(s):  LEFT LUMBAR 3 - LUMBAR 4, LUMBAR 4 - LUMBAR 5 MICROFORAMINOTOMY AND SUBLAMINAR DECOMPRESSION OF RIGHT L3-4 AND L

## 2018-09-27 NOTE — PROGRESS NOTES
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   S1, S2, No murmur, or rub  Abdomen:  SNT, BS present x4 quadrants  Extremities:  No cyanosis, atraumatic, trace BLE edema, cap refill <3 secs  Pulses:  2+ bilateral DP/PT/radi

## 2018-09-27 NOTE — PHYSICAL THERAPY NOTE
PHYSICAL THERAPY EVALUATION - INPATIENT     Room Number: 4925/5640-K  Evaluation Date: 9/27/2018  Type of Evaluation: Re-evaluation  Physician Order: PT Eval and Treat    Presenting Problem: s/p bilateral L3-5 microforaminotomies   Reason for Therapy Weekends - patient is alone @ home)  Drives: No  Patient Owned Equipment: Rolling walker(wheel chair)  Patient Regularly Uses: Reading glasses    Prior Level of Jerome: Pt lives alone in her own home. Pt has a caregiver 24hrs on Monday-Friday.  Pt is A Little   How much help from another person does the patient currently need. ..   -   Moving to and from a bed to a chair (including a wheelchair)?: A Little   -   Need to walk in hospital room?: A Little   -   Climbing 3-5 steps with a railing?: Total training  Posture  Strengthening  Transfer training    Patient End of Session: Up in chair;Needs met;Call light within reach;RN aware of session/findings; All patient questions and concerns addressed;SCDs in place    ASSESSMENT   Patient is a 80year old fe BSC at assistance level: supervision     Goal #3 Patient is able to ambulate 100 feet with assist device: walker - rolling at assistance level: supervision     Goal #4 Pt will recall 3/3 spinal precautions independently.     Goal #5    Goal #6    Goal Comme

## 2018-09-27 NOTE — OCCUPATIONAL THERAPY NOTE
OCCUPATIONAL THERAPY EVALUATION - INPATIENT     Room Number: 1051/2215-K  Evaluation Date: 9/27/2018  Type of Evaluation: Initial  Presenting Problem: L3-5 microforaminotomy    Physician Order: IP Consult to Occupational Therapy  Reason for Therapy: ADL/IA Equipment: Walk-in shower; Shower chair;Grab bar       Occupation/Status: retired MD  Hand Dominance: Right  Drives: No  Patient Regularly Uses: Reading glasses    Prior Level of Function: Pt reports modified independence with dressing, toileting, and light taking off regular lower body clothing?: Total  -   Bathing (including washing, rinsing, drying)?: A Lot  -   Toileting, which includes using toilet, bedpan or urinal? : A Lot  -   Putting on and taking off regular upper body clothing?: A [facility] living, rest and sleep, leisure and social participation.      The patient is functioning below her previous functional level and would benefit from skilled inpatient OT to address the above deficits, maximizing patient’s ability to return safely to her dashawn

## 2018-09-27 NOTE — PROGRESS NOTES
[facility]  Neurosurgery Progress Note    Sadi Favorite Patient Status:  Inpatient    1925 MRN [id number]   [facility] 6NE-A Attending Pritesh Knight MD   Hosp Day # 7 PCP No primary care provider on file.      Subjective:  Pt

## 2018-09-27 NOTE — CONSULTS
[facility]  Neurocritical Care       Name: Eric Wren  MRN: [id number]  Admission Date/Time: 9/20/2018 12:00 PM  Primary Care Provider:  No primary care provider on file. Reason for Consultation:   Post-op Care.     HPI:   Pa normal.      Patient Active Problem List    Spinal stenosis of lumbar region with neurogenic claudication         Priority: High [1]         Date Noted: 09/21/2018      Weakness of both lower extremities         Priority: High [1]         Date Noted: 09/20 Oral Cap Take 220 mg by mouth. Disp:  Rfl:  9/20/2018 at Unknown time     No Known Allergies  Social History    Socioeconomic History      Marital status:        Spouse name: Not on file      Number of children: Not on file      Years of education: N Sodium (PROTONIX) EC tab 40 mg 40 mg Oral QAM AC   hydrALAzine HCl (APRESOLINE) tab 25 mg 25 mg Oral Q8H ALNANAH   acetaminophen (TYLENOL EXTRA STRENGTH) tab 500 mg 500 mg Oral Q6H PRN   atenolol (TENORMIN) tab 25 mg 25 mg Oral Daily   PEG 3350 (MIRALAX) powde ability to perform plantar/dorsi flexion of feet 4/5 bilaterally (right foot slightly weaker than left), able to hold legs off bed--no drift (right better than left), equal effort to bending knees against resistance 4+/5 bilaterally. Gait deferred.       D with vacuum phenomena. There is extensive facet arthropathy. There is severe hypertrophy of the ligamentum flavum. There is a small annular bulging disc and osteophyte complex. There is severe canal stenosis. Moderate bilateral foraminal narrowing.  L5 (cpt=76700)    Result Date: 9/21/2018  PROCEDURE:  US ABDOMEN COMPLETE (CPT=76700)  COMPARISON:  None. INDICATIONS:  Elevated liver enzymes  TECHNIQUE:  Real time gray-scale ultrasound was used to evaluate the abdomen.   The exam includes images of the chaparro filling defects that are most consistent with air bubbles. Please correlate with fluoroscopic findings during the procedure. CONCLUSION:  Cholelithiasis with subsequent stone extraction. Correlate with fluoroscopic findings.     Dictated by: Hiwot Chavez Dictated by: Hank Pettit MD on 9/21/2018 at 0:09     Approved by: Hank Pettit MD            Ct Abdomen+pelvis(cpt=74176)    Result Date: 9/22/2018  PROCEDURE:  CT ABDOMEN+PELVIS (MPK=44114)  COMPARISON:  GABRIEL CT SPINE LUMBAR (CPT=72131), 9/22/2018, 14: adenopathy. PELVIC ORGANS:  4.4 cm calcified fibroid of the posterior uterine body. Smaller calcified fibroids. No adnexal mass. BONES:  Advanced degenerative changes. Please refer to separate report for CT of the lumbar spine. Right hip prosthesis.  L 0514   GLU  89   --   102*  105*   BUN  28*   --   24*  26*   CREATSERUM  1.28*   --   1.30*  1.42*   GFRAA  42*   --   41*  37*   GFRNAA  36*   --   35*  32*   CA  8.9   --   9.6  9.2   ALB  2.6*  2.9*  2.7*  2.6*   NA  136   --   138  137   K  4.2   -- Prophylaxis:  - SCD’s    Goals of the Day: PT/OT eval, pain management. A total of 35 minutes of critical care time (exclusive of billable procedures) was administered to manage and/or prevent neurologic instability.  This involved direct patient interve

## 2018-09-27 NOTE — PROGRESS NOTES
[facility]  Progress Note    Recardo Salaam Patient Status:  Inpatient    1925 MRN [id number]   [facility] 6NE-A Attending Jon Arias MD   Hosp Day # 7 PCP No primary care provider on file.        Assessment and Plan:  Pr Abdomen soft, nontender, nondistended, no organomegaly, bowel sounds present    CNS: no focal neurological deficits alert      Ext: no edema, cyanosis, or clubbing no  Clinical  DVT     negative     Skin no breakdown, cellulitis    Vascular: No phlebitis extraction. Correlate with fluoroscopic findings. Dictated by: Ryan Mcneill MD on 9/24/2018 at 17:52     Approved by: Ryan Mcneill MD            Xr Or Lumbar Spine (1 View)   (cpt=72020)    Result Date: 9/26/2018  CONCLUSION:  As above.     Dictated b

## 2018-09-27 NOTE — ANESTHESIA POSTPROCEDURE EVALUATION
[facility] Patient Status:  Inpatient   Age/Gender 80year old female MRN [id number]   Location [address] Attending Silvio Barrera MD   Hosp Day # 6 PCP No primary care provider on file.        Anes

## 2018-09-27 NOTE — CM/SW NOTE
LENNIE met with pt and her son  Mateo Byrnes to discuss dc plans. Pt had back surgery due to lumbar spinal stenosis. PT/OT advise JUSTICE. Pt feels strongly that she wants to go home with her caregiver.   Her caregiver is in her [de-identified] and usually stays with her [facility] [place]

## 2018-09-27 NOTE — PLAN OF CARE
Received from PACU s/p lumbar surgery at 2315. Alert/oriented x 3-4. PERRLA, brisk. Following commands, MODI x4 with bilateral lower weakness 3/4 motor strength; foot drop also noted (this is chronic).  Overnight with some forgetfulness noted, re-oriented an

## 2018-09-27 NOTE — PROGRESS NOTES
[facility]  Progress Note    Smita Goddard Patient Status:  Inpatient    1925 MRN [id number]   [facility] 6NE-A Attending Asuncion Estrada MD   Hosp Day # 7 PCP No primary care provider on file.        Subjective:  Doing well af stable  · Hx breast ca      Plan:     Stable post operatively without cardiac issues. No opposition to transfer when cleared by other services      Discussed plan of care with patient and nursing.        DIONICIO Avila  9/27/2018  12:02 PM      =====

## 2018-09-27 NOTE — CM/SW NOTE
SW spoke to PDV. Pt ok to dc to [facility]. SW spoke to [facility] [employer]. She will have bed for pt tomorrow.   Caryl Rice, 09/27/18, 3:45 PM

## 2018-09-27 NOTE — PROGRESS NOTES
[facility] Pharmacy Note:  Renal Dose Adjustment for Metoclopramide (REGLAN)    Victoria Mijares has been prescribed Metoclopramide (REGLAN) 10 mg every 6 hours as needed for nausea/vomiting.     Estimated Creatinine Clearance: 18.7 mL/min (A) (based on SCr of 1.4

## 2018-09-27 NOTE — PLAN OF CARE
PT doing well day 1 post op lumar spinal decompression. Dressing dry and intact with only very small amount of old drainage present.  Pt up to chair and ambulated short distance with PT and OT this morning, sat up in the chair from 7am-3pm. Pt seen by Gloria Jhaveri

## 2018-09-27 NOTE — PROGRESS NOTES
[facility] Pharmacy Note:  Renal Dose Adjustment for Tramadol Beni Ballard    Whit Huerta has been prescribed Tramadol (ULTRAM) 50 mg orally every 6 hours as needed for pain. Estimated Creatinine Clearance: 18.7 mL/min (A) (based on SCr of 1.42 mg/dL (H)).

## 2018-09-27 NOTE — CONSULTS
.[address] [facility] Patient Status:  Inpatient    1925 MRN [id number]   [facility] 6NE-A Attending Laxmi Atwood MD   Hosp Day # 7 PCP No primary care provider on file.      Patient Identification  Jeni Ferguson i Functional Status: Patient was independent with mobility/ambulation, transfers, ADL's, IADL's.   Support System and Family Circumstances (i.e., potential caregivers): caregivers employed, available: 24/7 if needed  Norma 34 / Accessibility: 2-story 50 mg 50 mg Oral Q12H PRN   [COMPLETED] morphINE sulfate (PF) 4 MG/ML injection      [COMPLETED] acetaminophen (OFIRMEV) infusion 1,000 mg 1,000 mg Intravenous Once   [COMPLETED] acetaminophen (OFIRMEV) 10 MG/ML infusion      [] dextrose 5 % with Pi atraumatic. Eyes:  Conjunctivae/lids clear. PERRL, EOMs intact. Vision functional.   Ears/Nose/Throat: Hearing intact. Lips, mucosa, and tongue normal. Teeth and gums normal. Moist mucous membranes.      Neck: No neck masses or thyroid enlargement/tendern pain.    Risk for infection. Risk for contractures and stiffness with consequent functional impairments.     Risk for thromboembolic disease with need for careful DVT prophylaxis, potential for bleeding and hematoma or hemarthrosis (and aggravating anemia)

## 2018-09-28 ENCOUNTER — TELEPHONE (OUTPATIENT)
Dept: INTERNAL MEDICINE CLINIC | Facility: CLINIC | Age: 83
End: 2018-09-28

## 2018-09-28 VITALS
RESPIRATION RATE: 10 BRPM | TEMPERATURE: 98 F | BODY MASS INDEX: 29.55 KG/M2 | DIASTOLIC BLOOD PRESSURE: 89 MMHG | SYSTOLIC BLOOD PRESSURE: 115 MMHG | OXYGEN SATURATION: 99 % | HEIGHT: 61 IN | HEART RATE: 62 BPM | WEIGHT: 156.5 LBS

## 2018-09-28 PROCEDURE — 99233 SBSQ HOSP IP/OBS HIGH 50: CPT | Performed by: OTHER

## 2018-09-28 PROCEDURE — 99232 SBSQ HOSP IP/OBS MODERATE 35: CPT | Performed by: INTERNAL MEDICINE

## 2018-09-28 RX ORDER — MAGNESIUM HYDROXIDE/ALUMINUM HYDROXICE/SIMETHICONE 120; 1200; 1200 MG/30ML; MG/30ML; MG/30ML
30 SUSPENSION ORAL 4 TIMES DAILY PRN
Qty: 1 BOTTLE | Refills: 0 | Status: SHIPPED | OUTPATIENT
Start: 2018-09-28

## 2018-09-28 RX ORDER — ACETAMINOPHEN 500 MG
TABLET ORAL EVERY 6 HOURS PRN
Qty: 40 TABLET | Refills: 0 | Status: SHIPPED | COMMUNITY
Start: 2018-09-28

## 2018-09-28 RX ORDER — MAGNESIUM HYDROXIDE/ALUMINUM HYDROXICE/SIMETHICONE 120; 1200; 1200 MG/30ML; MG/30ML; MG/30ML
30 SUSPENSION ORAL 4 TIMES DAILY PRN
Status: DISCONTINUED | OUTPATIENT
Start: 2018-09-28 | End: 2018-09-28

## 2018-09-28 RX ORDER — ACETAMINOPHEN AND CODEINE PHOSPHATE 300; 30 MG/1; MG/1
1 TABLET ORAL EVERY 4 HOURS PRN
Status: DISCONTINUED | OUTPATIENT
Start: 2018-09-28 | End: 2018-09-28

## 2018-09-28 RX ORDER — TIZANIDINE 2 MG/1
2 TABLET ORAL 3 TIMES DAILY PRN
Qty: 30 TABLET | Refills: 0 | Status: SHIPPED | COMMUNITY
Start: 2018-09-28

## 2018-09-28 RX ORDER — ACETAMINOPHEN AND CODEINE PHOSPHATE 300; 30 MG/1; MG/1
1 TABLET ORAL EVERY 4 HOURS PRN
Qty: 30 TABLET | Refills: 0 | Status: SHIPPED | OUTPATIENT
Start: 2018-09-28

## 2018-09-28 NOTE — PLAN OF CARE
Pt doing well but having more pain today. Pt seen by Neurosurgery and by Dr Anusha Michelle for family practice.  Pt C/O feeling confused after morphine given last night, although alert and oriented x 3 this am. Pt received Ultram and zanaflex for pain this am then

## 2018-09-28 NOTE — DISCHARGE SUMMARY
[facility]  Discharge Summary    Crystal Foreman Patient Status:  Inpatient    1925 MRN [id number]   [facility] 6NE-A Attending Chemo Toussaint MD   Hosp Day # 8 PCP No primary care provider on file.      Date of Admission:  medications    Amoxicillin-Pot Clavulanate 875-125 MG Oral Tab    Naproxen Sodium (ALEVE) 220 MG Oral Cap          Follow up Visits: Follow-up with [address] [place] [address] also can return to see me. Will have to see neurosurgery again.     Follow up Labs: CMP CBC woul preoperative pain and anxiety issues send out on amlodipine and beta-blocker at this may need to be readjusted.                                                   5.  Anemia hemoglobin 9 at discharge no evidence of significant iron deficiency likely renal.

## 2018-09-28 NOTE — PROGRESS NOTES
[facility]  Cardiology Progress Note    Lawson Spicer Patient Status:  Inpatient    1925 MRN [id number]   [facility] 6NE-A Attending Italo Lang MD   Hosp Day # 8 PCP No primary care provider on file.      Subjective:  Doin

## 2018-09-28 NOTE — PROGRESS NOTES
[facility]  Neurosurgery Progress Note    Manual Saran Patient Status:  Inpatient    1925 MRN [id number]   [facility] 6NE-A Attending Rob Ibarra MD   Hosp Day # 8 PCP No primary care provider on file.      Subjective:  Pt

## 2018-09-28 NOTE — OCCUPATIONAL THERAPY NOTE
OCCUPATIONAL THERAPY TREATMENT NOTE - INPATIENT     Room Number: 5155/5016-E  Session: 1   Number of Visits to Meet Established Goals: 5    Presenting Problem: L3-5 microforaminotomy    History related to current admission: Pt is a 80 y.o.  Female admitted rate  Location: low back, groin  Management Techniques:  Body mechanics     ACTIVITY TOLERANCE  Room air    ACTIVITIES OF DAILY LIVING ASSESSMENT  AM-PAC ‘6-Clicks’ Inpatient Daily Activity Short Form  How much help from another person does the patient curr mobility, toileting. Pt currently requires min (A) for bed mobility, min (A) for functional transfers, setup for upper body ADL, and max (A) for lower body ADL.  Recommend skilled OT in the [facility] hospital setting to increase independence with ADL/functional

## 2018-09-28 NOTE — PROGRESS NOTES
[facility]  Neurocritical Care       Subjective: Gene Stahl is a(n) 80year old female residual lower back discomfort and ongoing paraesthesia to bilateral feet, s/p Left lumbar 3, lumbar 4, lumbar 5 microforaminotomy and sublamin above ankles. Motor: Normal tone.  Strength in normal in both arms at 5/5. BLE with weakness, no drift.  Minimal ability to perform plantar/dorsi flexion of feet 4/5 bilaterally (right foot slightly weaker than left), able to hold legs off bed--no drift (ri There is severe canal stenosis. Moderate bilateral foraminal narrowing. L4-L5:  There is moderate disc narrowing with vacuum phenomena. There is extensive facet arthropathy. There is severe hypertrophy of the ligamentum flavum.   There is a small annula Hans Wong MD on 9/22/2018 at 15:29     Approved by: Hans Wong MD            Us Abdomen Complete (cpt=76700)    Result Date: 9/21/2018  PROCEDURE:  US ABDOMEN COMPLETE (CPT=76700)  COMPARISON:  None.   INDICATIONS:  Elevated liver enzymes number 5 with the balloon catheter inflated in the distal common duct. The final image demonstrates radiolucent filling defects that are most consistent with air bubbles. Please correlate with fluoroscopic findings during the procedure.       CONCLUSION: and L4-L5. 3.  Multilevel degenerative changes. 4.  As above. Continued clinical correlation recommended.      Dictated by: Hilton Benitez MD on 9/21/2018 at 0:09     Approved by: Hilton Benitez MD            Ct Abdomen+pelvis(cpt=74176)    Result Date: 9/22/201 No mass or hernia. URINARY BLADDER:  No visible focal wall thickening, lesion, or calculus. PELVIC NODES:  No adenopathy. PELVIC ORGANS:  4.4 cm calcified fibroid of the posterior uterine body. Smaller calcified fibroids. No adnexal mass.  BONES:  Cheryle Harder RBC  3.20*   --   3.27*   --   3.20*   HGB  9.0*  9.6*  9.3*   --   9.0*   HCT  26.6*   --   27.8*   --   27.5*   MCV  83.1   --   85.0   --   85.9   MCH  28.1   --   28.4   --   28.1   MCHC  33.8   --   33.5   --   32.7   RDW  15.9   --   15.9   --   15 (TENORMIN) tab 25 mg 25 mg Oral Daily   PEG 3350 (MIRALAX) powder packet 17 g 17 g Oral Daily   AmLODIPine Besylate (NORVASC) tab 5 mg 5 mg Oral BID       Assesment/Plan:   Principal Problem:    Weakness of both lower extremities  Active Problems:    Guerline Pettit affiliation with [facility]. Board Certified in Neurology, Vascular Neurology(Stroke), Neurocritical Care and Headache Medicine.

## 2018-09-28 NOTE — CM/SW NOTE
09/28/18 1000   Discharge disposition   Expected discharge disposition Rehab [address]   Name of Yanna 224   Discharge transportation [address]     Pt cleared for dc to [facility] today.   They would like dc at 1pm.  Pt going

## 2018-09-28 NOTE — TELEPHONE ENCOUNTER
Patient being discharged today to [facility] at 3:00pm. Patient does not want to continue new meds Apresoline and Hydralazine. Concerned will have adverse reaction. Asking meds to be omitted from discharge orders. Shauna Ortiz can be reached at [phone].

## 2018-09-28 NOTE — PHYSICAL THERAPY NOTE
PHYSICAL THERAPY TREATMENT NOTE - INPATIENT    Room Number: 9111/9195-H     Session:    Number of Visits to Meet Established Goals: 4    Presenting Problem: s/p bilateral L3-5 microforaminotomies     Problem List  Principal Problem:    Weakness of both lo breath    AM-PAC '6-Clicks' INPATIENT SHORT FORM - BASIC MOBILITY  How much difficulty does the patient currently have. ..  -   Turning over in bed (including adjusting bedclothes, sheets and blankets)?: A Little   -   Sitting down on and standing up from a followed by JUSTICE to return to PLOF and improve safety at home. DISCHARGE RECOMMENDATIONS  PT Discharge Recommendations: Sub-acute rehabilitation     PLAN  PT Treatment Plan: Bed mobility; Endurance; Energy conservation;Patient education;Gait training;Stren

## 2018-10-04 ENCOUNTER — TELEPHONE (OUTPATIENT)
Dept: SURGERY | Facility: CLINIC | Age: 83
End: 2018-10-04

## 2018-10-04 NOTE — OPERATIVE REPORT
[facility]    OPERATIVE REPORT    Patient:  Aileen Savage;  YOB: 1925     CSN:  [id number]; Medical Record Number:  [id number]    Admission Date:  9/20/2018 Operation Date:  9/26/2018    . ..........................     Operating y decompression. I first carried out laminotomy of the inferior aspect of L4 and the superior aspect of L5 down to ligamentum flavum with a high-speed drill.   I then remove the ligamentum flavum in its entirety of the base of the spinous processes all the w kermit.    Destiny Cotton.  Yessi White, [address] [facility]  Neurological Surgery    Co-Director  [facility]  [address]

## 2018-10-05 NOTE — TELEPHONE ENCOUNTER
Patient recently had surgery 9/26/18 with Michelle Cap for lumbar 3-5 microforaminotomies. Patient will be following up in the office on 10/9/18. Patient was prescribed tizanidine 2 mg and Tylenol #3 on 9/28/18.      Patient wanting to know when she can t

## 2018-10-05 NOTE — TELEPHONE ENCOUNTER
Informed [facility] MD that it is ok to take Aleve, patient is 2 weeks postop from microforaminotomies, no other issues.

## 2018-10-19 ENCOUNTER — SNF ADMIT/H&P (OUTPATIENT)
Dept: FAMILY MEDICINE CLINIC | Facility: CLINIC | Age: 83
End: 2018-10-19

## 2018-10-19 VITALS
RESPIRATION RATE: 20 BRPM | OXYGEN SATURATION: 98 % | DIASTOLIC BLOOD PRESSURE: 52 MMHG | TEMPERATURE: 97 F | SYSTOLIC BLOOD PRESSURE: 142 MMHG | HEART RATE: 59 BPM

## 2018-10-19 DIAGNOSIS — M48.062 SPINAL STENOSIS OF LUMBAR REGION WITH NEUROGENIC CLAUDICATION: Primary | ICD-10-CM

## 2018-10-19 DIAGNOSIS — I10 ESSENTIAL HYPERTENSION: ICD-10-CM

## 2018-10-19 DIAGNOSIS — N18.30 CKD (CHRONIC KIDNEY DISEASE) STAGE 3, GFR 30-59 ML/MIN (HCC): ICD-10-CM

## 2018-10-19 DIAGNOSIS — R53.81 PHYSICAL DECONDITIONING: ICD-10-CM

## 2018-10-19 PROCEDURE — 99306 1ST NF CARE HIGH MDM 50: CPT | Performed by: FAMILY MEDICINE

## 2018-10-24 ENCOUNTER — SNF VISIT (OUTPATIENT)
Dept: INTERNAL MEDICINE CLINIC | Age: 83
End: 2018-10-24

## 2018-10-24 DIAGNOSIS — R53.1 WEAKNESS: ICD-10-CM

## 2018-10-24 DIAGNOSIS — Z98.890 S/P LUMBAR LAMINECTOMY: Primary | ICD-10-CM

## 2018-10-24 DIAGNOSIS — I10 HYPERTENSION, UNSPECIFIED TYPE: ICD-10-CM

## 2018-10-24 DIAGNOSIS — G62.9 POLYNEUROPATHY: ICD-10-CM

## 2018-10-24 DIAGNOSIS — N18.9 CHRONIC KIDNEY DISEASE, UNSPECIFIED CKD STAGE: ICD-10-CM

## 2018-10-24 DIAGNOSIS — R26.9 NEUROLOGIC GAIT DYSFUNCTION: ICD-10-CM

## 2018-10-24 DIAGNOSIS — R60.9 CHRONIC EDEMA: ICD-10-CM

## 2018-10-24 PROCEDURE — 99309 SBSQ NF CARE MODERATE MDM 30: CPT | Performed by: NURSE PRACTITIONER

## 2018-10-25 VITALS
DIASTOLIC BLOOD PRESSURE: 67 MMHG | TEMPERATURE: 97 F | SYSTOLIC BLOOD PRESSURE: 152 MMHG | RESPIRATION RATE: 16 BRPM | OXYGEN SATURATION: 97 % | HEART RATE: 65 BPM

## 2018-10-25 RX ORDER — TROLAMINE SALICYLATE 10 G/100G
CREAM TOPICAL 3 TIMES DAILY
COMMUNITY

## 2018-10-25 RX ORDER — NAPROXEN 250 MG/1
220 TABLET ORAL DAILY
COMMUNITY

## 2018-10-25 RX ORDER — MULTIVIT-MIN/IRON FUM/FOLIC AC 7.5 MG-4
1 TABLET ORAL DAILY
COMMUNITY

## 2018-10-25 RX ORDER — PANTOPRAZOLE SODIUM 40 MG/1
40 TABLET, DELAYED RELEASE ORAL
COMMUNITY

## 2018-10-26 NOTE — PROGRESS NOTES
Lawson Tenaa  : 1925  Age 80year old  female patient is admitted to Facility: The [address] [place] [address] for rehabilitation and medical management.     [facility] Admit date:  18  Discharge date to [facility]:  10/18/18  ELOS:  1 status: Never Smoker      Smokeless tobacco: Never Used    Alcohol use: No      Frequency: Never    Drug use: Not on file      ALLERGIES:  No Known Allergies    CODE STATUS:  Full Code    ADVANCED CARE PLANNING TEAM: None      CURRENT MEDICATIONS     Luis denies orthopnea, denies cough  GI: denies nausea, vomiting, constipation, diarrhea; no rectal bleeding; no heartburn  :no dysuria, urgency or frequency; no vaginal discharge; no urinary incontinence; no hematuria  MUSCULOSKELETAL:---c/o merlin le weakness Laminectomy L3,L4 and L5/Prince le weakness/Spinal stenosis/Gait dysfunction/Peripheral Polyneuropathy  1. PT/OT to evaluate and treat  2. Naproxen Sodium 220 mg qd (2 tabs)  3. Protonix 40 mg qd  4. Aspercreme qd prn  5. Fall precautions  6.  Elevate prince le w

## 2018-11-01 ENCOUNTER — TELEPHONE (OUTPATIENT)
Dept: INTERNAL MEDICINE CLINIC | Facility: CLINIC | Age: 83
End: 2018-11-01

## 2018-11-01 NOTE — TELEPHONE ENCOUNTER
Cinthia Bolden would like to know if Dr. Lida Hickman will be following the patient with home health care. Orders are for RN, OT, PT, and aide.   Please return call

## 2018-11-01 NOTE — TELEPHONE ENCOUNTER
PC to [facility]  Confirmed that Dr. Stef Lr will be the PCP for Home Care

## 2018-11-13 ENCOUNTER — MED REC SCAN ONLY (OUTPATIENT)
Dept: INTERNAL MEDICINE CLINIC | Facility: CLINIC | Age: 83
End: 2018-11-13

## 2018-11-20 ENCOUNTER — TELEPHONE (OUTPATIENT)
Dept: INTERNAL MEDICINE CLINIC | Facility: CLINIC | Age: 83
End: 2018-11-20

## 2018-11-20 ENCOUNTER — MED REC SCAN ONLY (OUTPATIENT)
Dept: INTERNAL MEDICINE CLINIC | Facility: CLINIC | Age: 83
End: 2018-11-20

## 2018-11-20 NOTE — TELEPHONE ENCOUNTER
Returned call to [facility] spoke with Rodolfo Cash and gave verbal order for medical social worker.

## 2018-11-20 NOTE — TELEPHONE ENCOUNTER
Neftali Vera from [facility] called and asks that a nurse please return her phone call with a verbal order for a medical social worker. Neftali Vera can be reached at [phone].

## 2018-11-26 ENCOUNTER — MED REC SCAN ONLY (OUTPATIENT)
Dept: INTERNAL MEDICINE CLINIC | Facility: CLINIC | Age: 83
End: 2018-11-26

## 2018-11-29 ENCOUNTER — MED REC SCAN ONLY (OUTPATIENT)
Dept: INTERNAL MEDICINE CLINIC | Facility: CLINIC | Age: 83
End: 2018-11-29

## 2018-12-04 ENCOUNTER — MED REC SCAN ONLY (OUTPATIENT)
Dept: INTERNAL MEDICINE CLINIC | Facility: CLINIC | Age: 83
End: 2018-12-04

## 2018-12-13 ENCOUNTER — MED REC SCAN ONLY (OUTPATIENT)
Dept: INTERNAL MEDICINE CLINIC | Facility: CLINIC | Age: 83
End: 2018-12-13

## 2019-01-10 ENCOUNTER — MED REC SCAN ONLY (OUTPATIENT)
Dept: INTERNAL MEDICINE CLINIC | Facility: CLINIC | Age: 84
End: 2019-01-10

## 2019-01-23 ENCOUNTER — MED REC SCAN ONLY (OUTPATIENT)
Dept: INTERNAL MEDICINE CLINIC | Facility: CLINIC | Age: 84
End: 2019-01-23

## 2019-01-24 ENCOUNTER — TELEPHONE (OUTPATIENT)
Dept: INTERNAL MEDICINE CLINIC | Facility: CLINIC | Age: 84
End: 2019-01-24

## 2019-01-24 NOTE — TELEPHONE ENCOUNTER
Patient would like Dr. Jacquie Stein to place home health orders for PT. She did not specify which company she wants.

## 2019-01-28 NOTE — TELEPHONE ENCOUNTER
Called patient again to discuss. Patient states she will look into her preferred company and give us a call back when she has that information.

## 2019-01-28 NOTE — TELEPHONE ENCOUNTER
Patient called back and states she can not find that information at this time but it is in her chart that she has used [facility] in the past, patient is agreeable to order being sent to this company.  Order form completed and faxed to Belinda Cartagena

## 2019-02-06 ENCOUNTER — TELEPHONE (OUTPATIENT)
Dept: INTERNAL MEDICINE CLINIC | Facility: CLINIC | Age: 84
End: 2019-02-06

## 2019-02-06 NOTE — TELEPHONE ENCOUNTER
PC to [address] ( [address] [facility] ) RN - Tasha Bravo regarding patient    Deep wound R heel  stage #3 early 4 , foul smelling drainage      Gave orders for wound care RN to assess and recommend treatment plan    Patient recently finished course of doxycl

## 2019-02-06 NOTE — TELEPHONE ENCOUNTER
Kimani Tenorio from [facility] called regarding patient. She asks that a nurse please return her phone call.

## 2019-02-07 ENCOUNTER — TELEPHONE (OUTPATIENT)
Dept: INTERNAL MEDICINE CLINIC | Facility: CLINIC | Age: 84
End: 2019-02-07

## 2019-02-07 NOTE — TELEPHONE ENCOUNTER
See alternative TE.  Patient referred to [facility] home visit physician and [facility] notified of this referral.

## 2019-02-07 NOTE — TELEPHONE ENCOUNTER
Patient needs face to face for [facility]. Dr. Raymond Rodney unable to see patient at home at this time. Patient would need visiting home care physician.  Ernesto Rowley and spoke with Geri Prescott who said they can mail to patient a informa

## 2019-02-12 NOTE — TELEPHONE ENCOUNTER
Script sent to [employer], confirmed pharmacy with [facility] agency as we did not have one on file.

## 2019-02-12 NOTE — TELEPHONE ENCOUNTER
Per the nurse, I called Jung Jean to let her know again, that we are recommending an outside physician to see the patient.       Since that will take some time to arrange, Jung Jean would like to know if Dr. Sana Mijares will send a prescription for Santyl ointment to the

## 2019-02-18 ENCOUNTER — TELEPHONE (OUTPATIENT)
Dept: INTERNAL MEDICINE CLINIC | Facility: CLINIC | Age: 84
End: 2019-02-18

## 2019-02-18 NOTE — TELEPHONE ENCOUNTER
Savita Christine from [facility] called asking for patient's last office notes to be faxed to [phone]

## 2019-02-20 ENCOUNTER — MED REC SCAN ONLY (OUTPATIENT)
Dept: INTERNAL MEDICINE CLINIC | Facility: CLINIC | Age: 84
End: 2019-02-20

## 2019-02-25 ENCOUNTER — TELEPHONE (OUTPATIENT)
Dept: INTERNAL MEDICINE CLINIC | Facility: CLINIC | Age: 84
End: 2019-02-25

## 2019-02-25 ENCOUNTER — MED REC SCAN ONLY (OUTPATIENT)
Dept: INTERNAL MEDICINE CLINIC | Facility: CLINIC | Age: 84
End: 2019-02-25

## 2019-02-25 NOTE — TELEPHONE ENCOUNTER
Per Dr. Allison Costa: Check in on patient.      Dr. Allison Costa: Called son  Bryce Sheppard and he said patient is doing reasonably well, visiting wound care nurse to address wound, ointment has been prescribed and they are awaiting home visiting MD. Overall patient doing

## 2019-02-27 ENCOUNTER — TELEPHONE (OUTPATIENT)
Dept: INTERNAL MEDICINE CLINIC | Facility: CLINIC | Age: 84
End: 2019-02-27

## 2019-02-27 ENCOUNTER — MED REC SCAN ONLY (OUTPATIENT)
Dept: INTERNAL MEDICINE CLINIC | Facility: CLINIC | Age: 84
End: 2019-02-27

## 2019-03-05 ENCOUNTER — TELEPHONE (OUTPATIENT)
Dept: INTERNAL MEDICINE CLINIC | Facility: CLINIC | Age: 84
End: 2019-03-05

## 2019-03-05 NOTE — TELEPHONE ENCOUNTER
Requesting authorization for a face to face encounter with their wound doctor d/t non-healing wound and if they can get a signature for the auth.   Thank you,

## 2019-03-05 NOTE — TELEPHONE ENCOUNTER
Daryl called back and states she has orders signed by Dr. Ward Carson and that he works with patients son so they are still anticipating him signing orders.  Explained that if they received an order signed it was in error as patient has not had face to face and

## 2019-03-05 NOTE — TELEPHONE ENCOUNTER
Called back and notified Antoni Duffy agency again that unfortunately we have not seen patient in office for face to face therefore per medicare guidelines we are unable to sign orders for Antoni Duffy. Provided name for home visiting physician we recommended for patient.

## 2019-08-15 ENCOUNTER — TELEPHONE (OUTPATIENT)
Dept: INTERNAL MEDICINE CLINIC | Facility: CLINIC | Age: 84
End: 2019-08-15

## 2021-12-08 NOTE — TELEPHONE ENCOUNTER
Michael Clement is asking for the last office visit notes for this patient. She has not been seen in this office at all.     Please call Yelena Marcus [phone] [facility] 
PC to [address] 54. Let her know patient was last seen by Dr. Wright Monday in Sept while hospitalized. Dr. Wright Monday not able to see patient for home visits.      Please see telephone encounter from 2/7/2019 with information and r
No

## 2022-06-10 ENCOUNTER — APPOINTMENT (OUTPATIENT)
Dept: ULTRASOUND IMAGING | Facility: HOSPITAL | Age: 87
End: 2022-06-10
Attending: HOSPITALIST
Payer: MEDICARE

## 2022-06-10 ENCOUNTER — HOSPITAL ENCOUNTER (INPATIENT)
Facility: HOSPITAL | Age: 87
LOS: 8 days | Discharge: HOME HEALTH | End: 2022-06-18
Attending: EMERGENCY MEDICINE | Admitting: HOSPITALIST
Payer: MEDICARE

## 2022-06-10 ENCOUNTER — APPOINTMENT (OUTPATIENT)
Dept: GENERAL RADIOLOGY | Facility: HOSPITAL | Age: 87
End: 2022-06-10
Attending: EMERGENCY MEDICINE
Payer: MEDICARE

## 2022-06-10 DIAGNOSIS — K29.00 ACUTE GASTRITIS WITHOUT HEMORRHAGE, UNSPECIFIED GASTRITIS TYPE: Primary | ICD-10-CM

## 2022-06-10 DIAGNOSIS — L03.115 CELLULITIS OF RIGHT LOWER EXTREMITY: ICD-10-CM

## 2022-06-10 PROBLEM — I96 GANGRENE OF RIGHT FOOT (HCC): Status: ACTIVE | Noted: 2022-06-10

## 2022-06-10 PROBLEM — L03.90 CELLULITIS: Status: ACTIVE | Noted: 2022-06-10

## 2022-06-10 LAB
ALBUMIN SERPL-MCNC: 3.4 G/DL (ref 3.4–5)
ALP LIVER SERPL-CCNC: 127 U/L
ALT SERPL-CCNC: 14 U/L
ANION GAP SERPL CALC-SCNC: 8 MMOL/L (ref 0–18)
AST SERPL-CCNC: 17 U/L (ref 15–37)
BASOPHILS # BLD AUTO: 0.01 X10(3) UL (ref 0–0.2)
BASOPHILS NFR BLD AUTO: 0.1 %
BILIRUB DIRECT SERPL-MCNC: <0.1 MG/DL (ref 0–0.2)
BILIRUB SERPL-MCNC: 0.3 MG/DL (ref 0.1–2)
BUN BLD-MCNC: 36 MG/DL (ref 7–18)
BUN/CREAT SERPL: 15.9 (ref 10–20)
CALCIUM BLD-MCNC: 9.9 MG/DL (ref 8.5–10.1)
CHLORIDE SERPL-SCNC: 111 MMOL/L (ref 98–112)
CO2 SERPL-SCNC: 20 MMOL/L (ref 21–32)
CREAT BLD-MCNC: 2.26 MG/DL
DEPRECATED RDW RBC AUTO: 49.1 FL (ref 35.1–46.3)
EOSINOPHIL # BLD AUTO: 0 X10(3) UL (ref 0–0.7)
EOSINOPHIL NFR BLD AUTO: 0 %
ERYTHROCYTE [DISTWIDTH] IN BLOOD BY AUTOMATED COUNT: 15.1 % (ref 11–15)
EST. AVERAGE GLUCOSE BLD GHB EST-MCNC: 114 MG/DL (ref 68–126)
GLUCOSE BLD-MCNC: 177 MG/DL (ref 70–99)
GLUCOSE BLDC GLUCOMTR-MCNC: 148 MG/DL (ref 70–99)
HBA1C MFR BLD: 5.6 % (ref ?–5.7)
HCT VFR BLD AUTO: 27.3 %
HGB BLD-MCNC: 8.4 G/DL
IMM GRANULOCYTES # BLD AUTO: 0.04 X10(3) UL (ref 0–1)
IMM GRANULOCYTES NFR BLD: 0.4 %
LIPASE SERPL-CCNC: 71 U/L (ref 73–393)
LYMPHOCYTES # BLD AUTO: 0.46 X10(3) UL (ref 1–4)
LYMPHOCYTES NFR BLD AUTO: 4.9 %
MCH RBC QN AUTO: 27.5 PG (ref 26–34)
MCHC RBC AUTO-ENTMCNC: 30.8 G/DL (ref 31–37)
MCV RBC AUTO: 89.5 FL
MONOCYTES # BLD AUTO: 0.25 X10(3) UL (ref 0.1–1)
MONOCYTES NFR BLD AUTO: 2.6 %
NEUTROPHILS # BLD AUTO: 8.7 X10 (3) UL (ref 1.5–7.7)
NEUTROPHILS # BLD AUTO: 8.7 X10(3) UL (ref 1.5–7.7)
NEUTROPHILS NFR BLD AUTO: 92 %
OSMOLALITY SERPL CALC.SUM OF ELEC: 301 MOSM/KG (ref 275–295)
PLATELET # BLD AUTO: 398 10(3)UL (ref 150–450)
POTASSIUM SERPL-SCNC: 4.6 MMOL/L (ref 3.5–5.1)
PROT SERPL-MCNC: 8.3 G/DL (ref 6.4–8.2)
RBC # BLD AUTO: 3.05 X10(6)UL
SARS-COV-2 RNA RESP QL NAA+PROBE: NOT DETECTED
SODIUM SERPL-SCNC: 139 MMOL/L (ref 136–145)
TROPONIN I HIGH SENSITIVITY: 10 NG/L
WBC # BLD AUTO: 9.5 X10(3) UL (ref 4–11)

## 2022-06-10 PROCEDURE — 99223 1ST HOSP IP/OBS HIGH 75: CPT | Performed by: HOSPITALIST

## 2022-06-10 PROCEDURE — 93970 EXTREMITY STUDY: CPT | Performed by: HOSPITALIST

## 2022-06-10 PROCEDURE — 73630 X-RAY EXAM OF FOOT: CPT | Performed by: EMERGENCY MEDICINE

## 2022-06-10 RX ORDER — ACETAMINOPHEN 500 MG
500 TABLET ORAL EVERY 4 HOURS PRN
Status: DISCONTINUED | OUTPATIENT
Start: 2022-06-10 | End: 2022-06-18

## 2022-06-10 RX ORDER — ACETAMINOPHEN 325 MG/1
650 TABLET ORAL EVERY 4 HOURS PRN
Status: DISCONTINUED | OUTPATIENT
Start: 2022-06-10 | End: 2022-06-18

## 2022-06-10 RX ORDER — HYDROCODONE BITARTRATE AND ACETAMINOPHEN 5; 325 MG/1; MG/1
2 TABLET ORAL EVERY 4 HOURS PRN
Status: DISCONTINUED | OUTPATIENT
Start: 2022-06-10 | End: 2022-06-18

## 2022-06-10 RX ORDER — SODIUM CHLORIDE 9 MG/ML
INJECTION, SOLUTION INTRAVENOUS CONTINUOUS
Status: DISCONTINUED | OUTPATIENT
Start: 2022-06-10 | End: 2022-06-14

## 2022-06-10 RX ORDER — HYDRALAZINE HYDROCHLORIDE 20 MG/ML
10 INJECTION INTRAMUSCULAR; INTRAVENOUS ONCE
Status: COMPLETED | OUTPATIENT
Start: 2022-06-10 | End: 2022-06-10

## 2022-06-10 RX ORDER — HEPARIN SODIUM 5000 [USP'U]/ML
5000 INJECTION, SOLUTION INTRAVENOUS; SUBCUTANEOUS EVERY 12 HOURS SCHEDULED
Status: DISCONTINUED | OUTPATIENT
Start: 2022-06-10 | End: 2022-06-12

## 2022-06-10 RX ORDER — METOPROLOL TARTRATE 5 MG/5ML
5 INJECTION INTRAVENOUS ONCE
Status: DISCONTINUED | OUTPATIENT
Start: 2022-06-10 | End: 2022-06-10

## 2022-06-10 RX ORDER — VANCOMYCIN HYDROCHLORIDE 125 MG/1
125 CAPSULE ORAL DAILY
Status: DISCONTINUED | OUTPATIENT
Start: 2022-06-10 | End: 2022-06-18

## 2022-06-10 RX ORDER — SODIUM CHLORIDE 9 MG/ML
INJECTION, SOLUTION INTRAVENOUS CONTINUOUS
Status: DISCONTINUED | OUTPATIENT
Start: 2022-06-10 | End: 2022-06-10

## 2022-06-10 RX ORDER — VANCOMYCIN HYDROCHLORIDE
25 ONCE
Status: COMPLETED | OUTPATIENT
Start: 2022-06-10 | End: 2022-06-10

## 2022-06-10 RX ORDER — FAMOTIDINE 10 MG/ML
20 INJECTION, SOLUTION INTRAVENOUS ONCE
Status: COMPLETED | OUTPATIENT
Start: 2022-06-10 | End: 2022-06-10

## 2022-06-10 RX ORDER — HYDROCODONE BITARTRATE AND ACETAMINOPHEN 5; 325 MG/1; MG/1
1 TABLET ORAL EVERY 4 HOURS PRN
Status: DISCONTINUED | OUTPATIENT
Start: 2022-06-10 | End: 2022-06-18

## 2022-06-10 RX ORDER — MORPHINE SULFATE 2 MG/ML
2 INJECTION, SOLUTION INTRAMUSCULAR; INTRAVENOUS ONCE
Status: COMPLETED | OUTPATIENT
Start: 2022-06-10 | End: 2022-06-10

## 2022-06-10 RX ORDER — METOCLOPRAMIDE HYDROCHLORIDE 5 MG/ML
5 INJECTION INTRAMUSCULAR; INTRAVENOUS EVERY 8 HOURS PRN
Status: DISCONTINUED | OUTPATIENT
Start: 2022-06-10 | End: 2022-06-18

## 2022-06-10 RX ORDER — NICOTINE POLACRILEX 4 MG
15 LOZENGE BUCCAL
Status: DISCONTINUED | OUTPATIENT
Start: 2022-06-10 | End: 2022-06-18

## 2022-06-10 RX ORDER — DEXTROSE MONOHYDRATE 25 G/50ML
50 INJECTION, SOLUTION INTRAVENOUS
Status: DISCONTINUED | OUTPATIENT
Start: 2022-06-10 | End: 2022-06-18

## 2022-06-10 RX ORDER — ONDANSETRON 2 MG/ML
4 INJECTION INTRAMUSCULAR; INTRAVENOUS EVERY 6 HOURS PRN
Status: DISCONTINUED | OUTPATIENT
Start: 2022-06-10 | End: 2022-06-18

## 2022-06-10 RX ORDER — NICOTINE POLACRILEX 4 MG
30 LOZENGE BUCCAL
Status: DISCONTINUED | OUTPATIENT
Start: 2022-06-10 | End: 2022-06-18

## 2022-06-10 NOTE — H&P
[facility]    PATIENT'S NAME: Karan Drummond   ATTENDING PHYSICIAN: Clement Betts MD   PATIENT ACCOUNT#:   [id number]    LOCATION:  [address] [place] [address] #:   [id number]       YOB: 1925  ADMISSION DATE:       06/10/2022    HISTORY AND PHYSICAL EXAMINATION    CHIEF COMPLAINT:  Right foot fourth and fifth toe gangrene. HISTORY OF PRESENT ILLNESS:  Patient is a 40-year-old  female who lives at home, she gets home health and assistance, who has been having difficulty with her right forefoot, fourth and fifth toes for the last several weeks, progressive gangrenous changes. She was started recently on antibiotics and then developed nausea and vomiting. Came in today to the emergency department for evaluation. CBC showed white blood cell count of 9.5 with left shift, hemoglobin 8.4 which is slightly below her baseline. Chemistry showed GFR of 18 which is below her baseline, BUN 36 and creatinine 2.26, bicarb 20, glucose 177. Lipase and liver function tests were unremarkable. X-ray of the foot showed no acute fracture, marked periarticular osteopenia, no definite cortical erosions, soft tissue irregularity along the plantar aspect of the metatarsophalangeal joints. Started empirically on IV antibiotics and gentle hydration. She will be admitted to the hospital for further management. PAST MEDICAL HISTORY:  Patient has a history of hypertension, chronic kidney disease stage 3, anemia of chronic kidney disease, osteoarthritis, osteoporosis, gastroesophageal reflux disease, degenerative joint disease of cervical, thoracic, and lumbar spine. PAST SURGICAL HISTORY:  Hemithyroidectomy, ovarian cystectomy, right mastectomy for breast cancer, left breast lumpectomy, hysterectomy, right total hip arthroplasty, lumbar laminectomy, ERCP procedure for choledocholithiasis and stone extraction.     MEDICATIONS:  Please see medication reconciliation list.      ALLERGIES:  No known drug allergies. FAMILY HISTORY:  She said both parents had heart disease. SOCIAL HISTORY:  No tobacco or alcohol use. Mainly bedbound, can ambulate a few steps with assist and walker. REVIEW OF SYSTEMS:  Patient said she has been having gangrenous changes in her toes for several weeks, and she was referred to Podiatry. She had refused bedside debridement in the recent past.  She denies any fever or chills. Recently started on antibiotics orally, but she started developing nausea and vomiting with poor appetite. Other 12-point review of systems is negative. PHYSICAL EXAMINATION:    GENERAL:  Alert, oriented to time, place, and person. Mild distress. VITAL SIGNS:  Temperature 97.8, pulse 91, respiratory rate 15, blood pressure 143/81, pulse ox 98% on room air. HEENT:  Atraumatic. Oropharynx clear. Dry mucous membranes. Normal hard and soft palate. Eyes:  Anicteric sclerae. NECK:  Supple. No lymphadenopathy. Trachea midline. Full range of motion. LUNGS:  Clear to auscultation bilaterally. Normal respiratory effort. HEART:  Regular rate and rhythm. S1, S2 auscultated. No murmur. ABDOMEN:  Soft, nondistended. No tenderness. Positive bowel sounds. EXTREMITIES:  +1 edema of both posterior legs with pressure superficial ulceration mid-calf area. Right foot:  There are gangrenous changes at the distal aspect of the third and fourth toes and beginning gangrenous changes of the third toe dorsal area. Foul-smelling odor noted on the right foot with cellulitic changes around the forefoot area. NEUROLOGIC:  Motor and sensory intact. VASCULAR:  Dorsalis pedis could not be palpated both feet. ASSESSMENT:    1. Right foot fourth and fifth toes and early third toe gangrenous changes with cellulitis and possible underlying osteomyelitis. 2.   Hypertension. 3.   Chronic kidney disease with acute on chronic component.   4.   Mild hyperglycemia, possible borderline diabetes mellitus type 2. PLAN:  Patient will be admitted to general medical floor. Obtain arterial duplex of right lower extremity to rule out vascular component. Monitor Accu-Cheks. Start IV vancomycin and Zosyn, oral vancomycin for C difficile prophylaxis. MRI scan of the right foot. Podiatry, vascular surgery, and infectious disease consults were notified. Wound service consult. Further recommendations to follow.     Dictated By Michelle Rowley MD  d: 06/10/2022 13:54:27  t: 06/10/2022 15:45:24  Job [id number]  FB/

## 2022-06-10 NOTE — ED INITIAL ASSESSMENT (HPI)
Patient from home for mid abdominal pain starting last night. Patient started on Cipro one week ago for foot infection, gangrenous toes to right foot 3-5th digit. Patient denies chest pain, sob, vomiting, fever. Patient states she had some diarrhea about a week ago.

## 2022-06-10 NOTE — PLAN OF CARE
Problem: Patient Centered Care  Goal: Patient preferences are identified and integrated in the patient's plan of care  Description: Interventions:  - What would you like us to know as we care for you?   - Provide timely, complete, and accurate information to patient/family  - Incorporate patient and family knowledge, values, beliefs, and cultural backgrounds into the planning and delivery of care  - Encourage patient/family to participate in care and decision-making at the level they choose  - Honor patient and family perspectives and choices  Outcome: Progressing     Problem: PAIN - ADULT  Goal: Verbalizes/displays adequate comfort level or patient's stated pain goal  Description: INTERVENTIONS:  - Encourage pt to monitor pain and request assistance  - Assess pain using appropriate pain scale  - Administer analgesics based on type and severity of pain and evaluate response  - Implement non-pharmacological measures as appropriate and evaluate response  - Consider cultural and social influences on pain and pain management  - Manage/alleviate anxiety  - Utilize distraction and/or relaxation techniques  - Monitor for opioid side effects  - Notify MD/LIP if interventions unsuccessful or patient reports new pain  - Anticipate increased pain with activity and pre-medicate as appropriate  Outcome: Progressing     Problem: CARDIOVASCULAR - ADULT  Goal: Maintains optimal cardiac output and hemodynamic stability  Description: INTERVENTIONS:  - Monitor vital signs, rhythm, and trends  - Monitor for bleeding, hypotension and signs of decreased cardiac output  - Evaluate effectiveness of vasoactive medications to optimize hemodynamic stability  - Monitor arterial and/or venous puncture sites for bleeding and/or hematoma  - Assess quality of pulses, skin color and temperature  - Assess for signs of decreased coronary artery perfusion - ex.  Angina  - Evaluate fluid balance, assess for edema, trend weights  Outcome: Progressing  Goal: Absence of cardiac arrhythmias or at baseline  Description: INTERVENTIONS:  - Continuous cardiac monitoring, monitor vital signs, obtain 12 lead EKG if indicated  - Evaluate effectiveness of antiarrhythmic and heart rate control medications as ordered  - Initiate emergency measures for life threatening arrhythmias  - Monitor electrolytes and administer replacement therapy as ordered  Outcome: Progressing     Patient is alert and orientated x 4. Patient is on RA. Patient has IVF running. Patient is on IV ABT.

## 2022-06-11 LAB — GLUCOSE BLDC GLUCOMTR-MCNC: 102 MG/DL (ref 70–99)

## 2022-06-11 PROCEDURE — 99233 SBSQ HOSP IP/OBS HIGH 50: CPT | Performed by: HOSPITALIST

## 2022-06-11 RX ORDER — AMLODIPINE BESYLATE 5 MG/1
5 TABLET ORAL DAILY
Status: DISCONTINUED | OUTPATIENT
Start: 2022-06-11 | End: 2022-06-11

## 2022-06-11 RX ORDER — ATENOLOL 25 MG/1
25 TABLET ORAL DAILY
Status: DISCONTINUED | OUTPATIENT
Start: 2022-06-11 | End: 2022-06-18

## 2022-06-11 RX ORDER — PANTOPRAZOLE SODIUM 40 MG/1
40 TABLET, DELAYED RELEASE ORAL
Status: DISCONTINUED | OUTPATIENT
Start: 2022-06-12 | End: 2022-06-18

## 2022-06-11 NOTE — CM/SW NOTE
06/11/22 1600   CM/SW Referral Data   Referral Source Social Work (self-referral)   Reason for Referral Discharge planning   Pertinent Medical Hx   Does patient have an established PCP? Yes  [facility])   Patient Info   Patient's Current Mental Status at Time of Assessment Alert   Patient's [address]   Discharge Needs   Anticipated D/C needs Home health care   Choice of Post-Acute Provider   Informed patient of right to choose their preferred provider Yes       Met with patient. Lives alone with a CG 9 hours per day. Patient is w/c bound at this time. Has W/C and chair lift at home. Patient is current with [address]. APN and PT also follow at home. Discussed possible need for additional home services at discharge. Patient is agreeable to home care referrals sent at this time. [facility] and [facility] are required based on patient's need. PLAN:  TBD based on progress. Has good support systems in place at home. Home care referrals sent in case additional services are required. Will continue to follow to determine needs. / to remain available for support and/or discharge planning.      Susan Dial MBA BSN RN [address]  RN Case Manager  [phone]

## 2022-06-11 NOTE — PLAN OF CARE
Ivf at 83 cc/hr. Iv abx continues. Norco for pain with relief. Right lower extremity arterial doppler to be done on Monday - per Elle. Assisted to Br on rolling chair with 2 max assist. Will continue to monitor. Problem: Patient Centered Care  Goal: Patient preferences are identified and integrated in the patient's plan of care  Description: Interventions:  - What would you like us to know as we care for you? Home with Home health assistance.   - Provide timely, complete, and accurate information to patient/family  - Incorporate patient and family knowledge, values, beliefs, and cultural backgrounds into the planning and delivery of care  - Encourage patient/family to participate in care and decision-making at the level they choose  - Honor patient and family perspectives and choices  Outcome: Progressing     Problem: PAIN - ADULT  Goal: Verbalizes/displays adequate comfort level or patient's stated pain goal  Description: INTERVENTIONS:  - Encourage pt to monitor pain and request assistance  - Assess pain using appropriate pain scale  - Administer analgesics based on type and severity of pain and evaluate response  - Implement non-pharmacological measures as appropriate and evaluate response  - Consider cultural and social influences on pain and pain management  - Manage/alleviate anxiety  - Utilize distraction and/or relaxation techniques  - Monitor for opioid side effects  - Notify MD/LIP if interventions unsuccessful or patient reports new pain  - Anticipate increased pain with activity and pre-medicate as appropriate  Outcome: Progressing     Problem: CARDIOVASCULAR - ADULT  Goal: Maintains optimal cardiac output and hemodynamic stability  Description: INTERVENTIONS:  - Monitor vital signs, rhythm, and trends  - Monitor for bleeding, hypotension and signs of decreased cardiac output  - Evaluate effectiveness of vasoactive medications to optimize hemodynamic stability  - Monitor arterial and/or venous puncture sites for bleeding and/or hematoma  - Assess quality of pulses, skin color and temperature  - Assess for signs of decreased coronary artery perfusion - ex. Angina  - Evaluate fluid balance, assess for edema, trend weights  Outcome: Progressing  Goal: Absence of cardiac arrhythmias or at baseline  Description: INTERVENTIONS:  - Continuous cardiac monitoring, monitor vital signs, obtain 12 lead EKG if indicated  - Evaluate effectiveness of antiarrhythmic and heart rate control medications as ordered  - Initiate emergency measures for life threatening arrhythmias  - Monitor electrolytes and administer replacement therapy as ordered  Outcome: Progressing     Problem: RISK FOR INFECTION - ADULT  Goal: Absence of fever/infection during anticipated neutropenic period  Description: INTERVENTIONS  - Monitor WBC  - Administer growth factors as ordered  - Implement neutropenic guidelines  Outcome: Progressing     Problem: SAFETY ADULT - FALL  Goal: Free from fall injury  Description: INTERVENTIONS:  - Assess pt frequently for physical needs  - Identify cognitive and physical deficits and behaviors that affect risk of falls.   - [place] fall precautions as indicated by assessment.  - Educate pt/family on patient safety including physical limitations  - Instruct pt to call for assistance with activity based on assessment  - Modify environment to reduce risk of injury  - Provide assistive devices as appropriate  - Consider OT/PT consult to assist with strengthening/mobility  - Encourage toileting schedule  Outcome: Progressing     Problem: DISCHARGE PLANNING  Goal: Discharge to home or other facility with appropriate resources  Description: INTERVENTIONS:  - Identify barriers to discharge w/pt and caregiver  - Include patient/family/discharge partner in discharge planning  - Arrange for needed discharge resources and transportation as appropriate  - Identify discharge learning needs (meds, wound care, etc)  - Arrange for interpreters to assist at discharge as needed  - Consider post-discharge preferences of patient/family/discharge partner  - Complete POLST form as appropriate  - Assess patient's ability to be responsible for managing their own health  - Refer to Case Management Department for coordinating discharge planning if the patient needs post-hospital services based on physician/LIP order or complex needs related to functional status, cognitive ability or social support system  Outcome: Progressing

## 2022-06-11 NOTE — PLAN OF CARE
Patient has sat up in room chair since 0750 this morning. Patient assisted to the bathroom with rolling chair. Patient denies any pain. Patient updated on POC. 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   - Provide timely, complete, and accurate information to patient/family  - Incorporate patient and family knowledge, values, beliefs, and cultural backgrounds into the planning and delivery of care  - Encourage patient/family to participate in care and decision-making at the level they choose  - Honor patient and family perspectives and choices  Outcome: Progressing     Problem: PAIN - ADULT  Goal: Verbalizes/displays adequate comfort level or patient's stated pain goal  Description: INTERVENTIONS:  - Encourage pt to monitor pain and request assistance  - Assess pain using appropriate pain scale  - Administer analgesics based on type and severity of pain and evaluate response  - Implement non-pharmacological measures as appropriate and evaluate response  - Consider cultural and social influences on pain and pain management  - Manage/alleviate anxiety  - Utilize distraction and/or relaxation techniques  - Monitor for opioid side effects  - Notify MD/LIP if interventions unsuccessful or patient reports new pain  - Anticipate increased pain with activity and pre-medicate as appropriate  Outcome: Progressing     Problem: CARDIOVASCULAR - ADULT  Goal: Maintains optimal cardiac output and hemodynamic stability  Description: INTERVENTIONS:  - Monitor vital signs, rhythm, and trends  - Monitor for bleeding, hypotension and signs of decreased cardiac output  - Evaluate effectiveness of vasoactive medications to optimize hemodynamic stability  - Monitor arterial and/or venous puncture sites for bleeding and/or hematoma  - Assess quality of pulses, skin color and temperature  - Assess for signs of decreased coronary artery perfusion - ex. Angina  - Evaluate fluid balance, assess for edema, trend weights  Outcome: Progressing  Goal: Absence of cardiac arrhythmias or at baseline  Description: INTERVENTIONS:  - Continuous cardiac monitoring, monitor vital signs, obtain 12 lead EKG if indicated  - Evaluate effectiveness of antiarrhythmic and heart rate control medications as ordered  - Initiate emergency measures for life threatening arrhythmias  - Monitor electrolytes and administer replacement therapy as ordered  Outcome: Progressing     Problem: RISK FOR INFECTION - ADULT  Goal: Absence of fever/infection during anticipated neutropenic period  Description: INTERVENTIONS  - Monitor WBC  - Administer growth factors as ordered  - Implement neutropenic guidelines  Outcome: Progressing     Problem: SAFETY ADULT - FALL  Goal: Free from fall injury  Description: INTERVENTIONS:  - Assess pt frequently for physical needs  - Identify cognitive and physical deficits and behaviors that affect risk of falls.   - [place] fall precautions as indicated by assessment.  - Educate pt/family on patient safety including physical limitations  - Instruct pt to call for assistance with activity based on assessment  - Modify environment to reduce risk of injury  - Provide assistive devices as appropriate  - Consider OT/PT consult to assist with strengthening/mobility  - Encourage toileting schedule  Outcome: Progressing     Problem: DISCHARGE PLANNING  Goal: Discharge to home or other facility with appropriate resources  Description: INTERVENTIONS:  - Identify barriers to discharge w/pt and caregiver  - Include patient/family/discharge partner in discharge planning  - Arrange for needed discharge resources and transportation as appropriate  - Identify discharge learning needs (meds, wound care, etc)  - Arrange for interpreters to assist at discharge as needed  - Consider post-discharge preferences of patient/family/discharge partner  - Complete POLST form as appropriate  - Assess patient's ability to be responsible for managing their own health  - Refer to Case Management Department for coordinating discharge planning if the patient needs post-hospital services based on physician/LIP order or complex needs related to functional status, cognitive ability or social support system  Outcome: Progressing     Problem: Patient/Family Goals  Goal: Patient/Family Long Term Goal  Description: Patient's Long Term Goal: to go home    Interventions:  - Further testing  - Follow MD recommendations  - Medication compliance  - PT/OT  - See additional Care Plan goals for specific interventions  Outcome: Progressing  Goal: Patient/Family Short Term Goal  Description: Patient's Short Term Goal: Find out what is happening to my toes    Interventions:   - Further testing - MRI  - Medication compliance  - Follow MD recommendations  - See additional Care Plan goals for specific interventions  Outcome: Progressing

## 2022-06-11 NOTE — PROGRESS NOTES
[facility] Pharmacy Note:  Renal Adjustment for piperacillin/tazobactam (Ignacio Venegas)    Audra Wolf is a 80year old patient who has been prescribed piperacillin/tazobactam (ZOSYN) 3.375g every 8 hrs. The estimated creatinine clearance is 11.3 mL/min (A) (based on SCr of 2.26 mg/dL (H)). The dose has been adjusted to piperacillin/tazobactam (ZOSYN) 3.375g every 12 hrs per hospital renal dose adjustment protocol for treatment of osteomyelitis. Pharmacy will follow and adjust dose as warranted for additional renal function changes.     Thank you,    Matteo Mackey, PharmD  6/11/2022  7:39 AM

## 2022-06-11 NOTE — CM/SW NOTE
Department  notified of request for [address], aidin referrals started. Assigned CM/SW to follow up with pt/family on further discharge planning.      Hernesto WALKER [facility]

## 2022-06-12 LAB
A1 ANTIGEN: POSITIVE
ANION GAP SERPL CALC-SCNC: 8 MMOL/L (ref 0–18)
ANTIBODY SCREEN: NEGATIVE
BASOPHILS # BLD AUTO: 0.02 X10(3) UL (ref 0–0.2)
BASOPHILS NFR BLD AUTO: 0.3 %
BUN BLD-MCNC: 36 MG/DL (ref 7–18)
BUN/CREAT SERPL: 16.5 (ref 10–20)
CALCIUM BLD-MCNC: 8.8 MG/DL (ref 8.5–10.1)
CHLORIDE SERPL-SCNC: 114 MMOL/L (ref 98–112)
CO2 SERPL-SCNC: 20 MMOL/L (ref 21–32)
CREAT BLD-MCNC: 2.18 MG/DL
DEPRECATED RDW RBC AUTO: 51.9 FL (ref 35.1–46.3)
DIRECT COOMBS POLY: NEGATIVE
EOSINOPHIL # BLD AUTO: 0.09 X10(3) UL (ref 0–0.7)
EOSINOPHIL NFR BLD AUTO: 1.2 %
ERYTHROCYTE [DISTWIDTH] IN BLOOD BY AUTOMATED COUNT: 15.6 % (ref 11–15)
GLUCOSE BLD-MCNC: 88 MG/DL (ref 70–99)
HCT VFR BLD AUTO: 22.4 %
HGB BLD-MCNC: 7 G/DL
IMM GRANULOCYTES # BLD AUTO: 0.03 X10(3) UL (ref 0–1)
IMM GRANULOCYTES NFR BLD: 0.4 %
IRON SATN MFR SERPL: 8 %
IRON SERPL-MCNC: 16 UG/DL
LYMPHOCYTES # BLD AUTO: 0.8 X10(3) UL (ref 1–4)
LYMPHOCYTES NFR BLD AUTO: 10.5 %
M ANTIGEN: POSITIVE
MCH RBC QN AUTO: 28.6 PG (ref 26–34)
MCHC RBC AUTO-ENTMCNC: 31.3 G/DL (ref 31–37)
MCV RBC AUTO: 91.4 FL
MONOCYTES # BLD AUTO: 0.84 X10(3) UL (ref 0.1–1)
MONOCYTES NFR BLD AUTO: 11 %
NEUTROPHILS # BLD AUTO: 5.87 X10 (3) UL (ref 1.5–7.7)
NEUTROPHILS # BLD AUTO: 5.87 X10(3) UL (ref 1.5–7.7)
NEUTROPHILS NFR BLD AUTO: 76.6 %
OSMOLALITY SERPL CALC.SUM OF ELEC: 302 MOSM/KG (ref 275–295)
PLATELET # BLD AUTO: 304 10(3)UL (ref 150–450)
POTASSIUM SERPL-SCNC: 4.7 MMOL/L (ref 3.5–5.1)
RBC # BLD AUTO: 2.45 X10(6)UL
RH BLOOD TYPE: POSITIVE
SODIUM SERPL-SCNC: 142 MMOL/L (ref 136–145)
TIBC SERPL-MCNC: 198 UG/DL (ref 240–450)
TRANSFERRIN SERPL-MCNC: 133 MG/DL (ref 200–360)
VANCOMYCIN TROUGH SERPL-MCNC: 10.3 UG/ML (ref 10–20)
WBC # BLD AUTO: 7.7 X10(3) UL (ref 4–11)

## 2022-06-12 PROCEDURE — 99233 SBSQ HOSP IP/OBS HIGH 50: CPT | Performed by: HOSPITALIST

## 2022-06-12 NOTE — PROGRESS NOTES
[facility] Dosing Service    Follow-up Pharmacokinetic Consult for Vancomycin Dosing     Bladimir Phillips is a 80year old patient who is being treated for osteomyelitis. Patient is on day 2 of vancomycin and is currently receiving 1500 mg ONCE. Labs:  Lab Results   Component Value Date    CREATSERUM 2.18 06/12/2022      CrCl:  Estimated Creatinine Clearance: 11.7 mL/min (A) (based on SCr of 2.18 mg/dL (H)). Levels:  random: 10.3 mcg/mL    Based on the above:    1. Initiate maintenance Vancomycin at 1000 mg IVPB ONCE based on pharmacokinetics and renal function. 2.  Will re-check vancomycin random level(s) 48 hrs . Goal trough is 10-15 mcg/mL unless otherwise noted by ordering provider. 3.  Pharmacy will order SCr as clinically indicated while on vancomycin to assess renal function. 4. Pharmacy will follow and monitor renal function, toxicity and efficacy. We appreciate the opportunity to assist in the care of this patient.     Mina Quezada, PharmD  6/12/2022  3:13 PM  [address] IP Pharmacy Extension: [phone]

## 2022-06-12 NOTE — CM/SW NOTE
SW received MDO for POLST. SW placed POLST form on chart. MD to discuss w/ pt/family, fill out middle section of POLST form, and sign prior to SW/CTL/RN witnessing POLST form. SW/CM received message from [facility] - they are able to accept but have been getting information that pt may be current w/ another [facility] agency. Per SW/CM sticky note communication, RADHA Whitaker indicated need to confirm if pt is current w/ [facility]. SW sent pending [facility] referral to [facility] via [facility] and requested they confirm if they are current w/ pt for services. PLAN: Home w/ HH - pending if current w/ [facility] or if needs new agency      SW/CM to remain available for support and/or discharge planning.          Jenifer Jennings, MSW, [facility]

## 2022-06-12 NOTE — PLAN OF CARE
Iv abx,  ivf continues. Arterial doppler US on Monday. Claimed tolerable pain on right toes- refused pain med. Will continue to monitor. 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   - Provide timely, complete, and accurate information to patient/family  - Incorporate patient and family knowledge, values, beliefs, and cultural backgrounds into the planning and delivery of care  - Encourage patient/family to participate in care and decision-making at the level they choose  - Honor patient and family perspectives and choices  Outcome: Progressing     Problem: PAIN - ADULT  Goal: Verbalizes/displays adequate comfort level or patient's stated pain goal  Description: INTERVENTIONS:  - Encourage pt to monitor pain and request assistance  - Assess pain using appropriate pain scale  - Administer analgesics based on type and severity of pain and evaluate response  - Implement non-pharmacological measures as appropriate and evaluate response  - Consider cultural and social influences on pain and pain management  - Manage/alleviate anxiety  - Utilize distraction and/or relaxation techniques  - Monitor for opioid side effects  - Notify MD/LIP if interventions unsuccessful or patient reports new pain  - Anticipate increased pain with activity and pre-medicate as appropriate  Outcome: Progressing     Problem: CARDIOVASCULAR - ADULT  Goal: Maintains optimal cardiac output and hemodynamic stability  Description: INTERVENTIONS:  - Monitor vital signs, rhythm, and trends  - Monitor for bleeding, hypotension and signs of decreased cardiac output  - Evaluate effectiveness of vasoactive medications to optimize hemodynamic stability  - Monitor arterial and/or venous puncture sites for bleeding and/or hematoma  - Assess quality of pulses, skin color and temperature  - Assess for signs of decreased coronary artery perfusion - ex. Angina  - Evaluate fluid balance, assess for edema, trend weights  Outcome: Progressing  Goal: Absence of cardiac arrhythmias or at baseline  Description: INTERVENTIONS:  - Continuous cardiac monitoring, monitor vital signs, obtain 12 lead EKG if indicated  - Evaluate effectiveness of antiarrhythmic and heart rate control medications as ordered  - Initiate emergency measures for life threatening arrhythmias  - Monitor electrolytes and administer replacement therapy as ordered  Outcome: Progressing     Problem: RISK FOR INFECTION - ADULT  Goal: Absence of fever/infection during anticipated neutropenic period  Description: INTERVENTIONS  - Monitor WBC  - Administer growth factors as ordered  - Implement neutropenic guidelines  Outcome: Progressing     Problem: SAFETY ADULT - FALL  Goal: Free from fall injury  Description: INTERVENTIONS:  - Assess pt frequently for physical needs  - Identify cognitive and physical deficits and behaviors that affect risk of falls.   - [place] fall precautions as indicated by assessment.  - Educate pt/family on patient safety including physical limitations  - Instruct pt to call for assistance with activity based on assessment  - Modify environment to reduce risk of injury  - Provide assistive devices as appropriate  - Consider OT/PT consult to assist with strengthening/mobility  - Encourage toileting schedule  Outcome: Progressing     Problem: DISCHARGE PLANNING  Goal: Discharge to home or other facility with appropriate resources  Description: INTERVENTIONS:  - Identify barriers to discharge w/pt and caregiver  - Include patient/family/discharge partner in discharge planning  - Arrange for needed discharge resources and transportation as appropriate  - Identify discharge learning needs (meds, wound care, etc)  - Arrange for interpreters to assist at discharge as needed  - Consider post-discharge preferences of patient/family/discharge partner  - Complete POLST form as appropriate  - Assess patient's ability to be responsible for managing their own health  - Refer to Case Management Department for coordinating discharge planning if the patient needs post-hospital services based on physician/LIP order or complex needs related to functional status, cognitive ability or social support system  Outcome: Progressing     Problem: Patient/Family Goals  Goal: Patient/Family Long Term Goal  Description: Patient's Long Term Goal: to go home    Interventions:  - Further testing  - Follow MD recommendations  - Medication compliance  - PT/OT  - See additional Care Plan goals for specific interventions  Outcome: Progressing  Goal: Patient/Family Short Term Goal  Description: Patient's Short Term Goal: Find out what is happening to my toes    Interventions:   - Further testing - MRI  - Medication compliance  - Follow MD recommendations  - See additional Care Plan goals for specific interventions  Outcome: Progressing

## 2022-06-12 NOTE — PLAN OF CARE
Patient's heart rate dropped to 34 briefly but sustaining 45-63. Patient also had 1 pause lasting 2.7 seconds. Patient remains asymptomatic. Dr. Monica Bond paged. No response. TANIYA Heard paged and notified of above. Patient and son updated on POC. 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   - Provide timely, complete, and accurate information to patient/family  - Incorporate patient and family knowledge, values, beliefs, and cultural backgrounds into the planning and delivery of care  - Encourage patient/family to participate in care and decision-making at the level they choose  - Honor patient and family perspectives and choices  Outcome: Progressing     Problem: PAIN - ADULT  Goal: Verbalizes/displays adequate comfort level or patient's stated pain goal  Description: INTERVENTIONS:  - Encourage pt to monitor pain and request assistance  - Assess pain using appropriate pain scale  - Administer analgesics based on type and severity of pain and evaluate response  - Implement non-pharmacological measures as appropriate and evaluate response  - Consider cultural and social influences on pain and pain management  - Manage/alleviate anxiety  - Utilize distraction and/or relaxation techniques  - Monitor for opioid side effects  - Notify MD/LIP if interventions unsuccessful or patient reports new pain  - Anticipate increased pain with activity and pre-medicate as appropriate  Outcome: Progressing     Problem: CARDIOVASCULAR - ADULT  Goal: Maintains optimal cardiac output and hemodynamic stability  Description: INTERVENTIONS:  - Monitor vital signs, rhythm, and trends  - Monitor for bleeding, hypotension and signs of decreased cardiac output  - Evaluate effectiveness of vasoactive medications to optimize hemodynamic stability  - Monitor arterial and/or venous puncture sites for bleeding and/or hematoma  - Assess quality of pulses, skin color and temperature  - Assess for signs of decreased coronary artery perfusion - ex. Angina  - Evaluate fluid balance, assess for edema, trend weights  Outcome: Progressing  Goal: Absence of cardiac arrhythmias or at baseline  Description: INTERVENTIONS:  - Continuous cardiac monitoring, monitor vital signs, obtain 12 lead EKG if indicated  - Evaluate effectiveness of antiarrhythmic and heart rate control medications as ordered  - Initiate emergency measures for life threatening arrhythmias  - Monitor electrolytes and administer replacement therapy as ordered  Outcome: Progressing     Problem: RISK FOR INFECTION - ADULT  Goal: Absence of fever/infection during anticipated neutropenic period  Description: INTERVENTIONS  - Monitor WBC  - Administer growth factors as ordered  - Implement neutropenic guidelines  Outcome: Progressing     Problem: SAFETY ADULT - FALL  Goal: Free from fall injury  Description: INTERVENTIONS:  - Assess pt frequently for physical needs  - Identify cognitive and physical deficits and behaviors that affect risk of falls.   - [place] fall precautions as indicated by assessment.  - Educate pt/family on patient safety including physical limitations  - Instruct pt to call for assistance with activity based on assessment  - Modify environment to reduce risk of injury  - Provide assistive devices as appropriate  - Consider OT/PT consult to assist with strengthening/mobility  - Encourage toileting schedule  Outcome: Progressing     Problem: DISCHARGE PLANNING  Goal: Discharge to home or other facility with appropriate resources  Description: INTERVENTIONS:  - Identify barriers to discharge w/pt and caregiver  - Include patient/family/discharge partner in discharge planning  - Arrange for needed discharge resources and transportation as appropriate  - Identify discharge learning needs (meds, wound care, etc)  - Arrange for interpreters to assist at discharge as needed  - Consider post-discharge preferences of patient/family/discharge partner  - Complete POLST form as appropriate  - Assess patient's ability to be responsible for managing their own health  - Refer to Case Management Department for coordinating discharge planning if the patient needs post-hospital services based on physician/LIP order or complex needs related to functional status, cognitive ability or social support system  Outcome: Progressing     Problem: Patient/Family Goals  Goal: Patient/Family Long Term Goal  Description: Patient's Long Term Goal: to go home    Interventions:  - Further testing  - Follow MD recommendations  - Medication compliance  - PT/OT  - See additional Care Plan goals for specific interventions  Outcome: Progressing  Goal: Patient/Family Short Term Goal  Description: Patient's Short Term Goal: Find out what is happening to my toes    Interventions:   - Further testing - MRI  - Medication compliance  - Follow MD recommendations  - See additional Care Plan goals for specific interventions  Outcome: Progressing

## 2022-06-13 ENCOUNTER — APPOINTMENT (OUTPATIENT)
Dept: ULTRASOUND IMAGING | Facility: HOSPITAL | Age: 87
End: 2022-06-13
Attending: HOSPITALIST
Payer: MEDICARE

## 2022-06-13 LAB
ANION GAP SERPL CALC-SCNC: 7 MMOL/L (ref 0–18)
BASOPHILS # BLD AUTO: 0.04 X10(3) UL (ref 0–0.2)
BASOPHILS NFR BLD AUTO: 0.5 %
BILIRUB UR QL: NEGATIVE
BUN BLD-MCNC: 35 MG/DL (ref 7–18)
BUN/CREAT SERPL: 15.6 (ref 10–20)
CALCIUM BLD-MCNC: 8.5 MG/DL (ref 8.5–10.1)
CHLORIDE SERPL-SCNC: 116 MMOL/L (ref 98–112)
CLARITY UR: CLEAR
CO2 SERPL-SCNC: 19 MMOL/L (ref 21–32)
COLOR UR: YELLOW
CREAT BLD-MCNC: 2.24 MG/DL
CREAT UR-SCNC: 50.1 MG/DL
CREAT UR-SCNC: 50.1 MG/DL
DEPRECATED RDW RBC AUTO: 51.2 FL (ref 35.1–46.3)
EOSINOPHIL # BLD AUTO: 0.15 X10(3) UL (ref 0–0.7)
EOSINOPHIL NFR BLD AUTO: 2 %
ERYTHROCYTE [DISTWIDTH] IN BLOOD BY AUTOMATED COUNT: 15.5 % (ref 11–15)
GLUCOSE BLD-MCNC: 86 MG/DL (ref 70–99)
GLUCOSE UR-MCNC: NEGATIVE MG/DL
HCT VFR BLD AUTO: 22.6 %
HGB BLD-MCNC: 7 G/DL
HGB UR QL STRIP.AUTO: NEGATIVE
IMM GRANULOCYTES # BLD AUTO: 0.03 X10(3) UL (ref 0–1)
IMM GRANULOCYTES NFR BLD: 0.4 %
KETONES UR-MCNC: NEGATIVE MG/DL
LEUKOCYTE ESTERASE UR QL STRIP.AUTO: NEGATIVE
LYMPHOCYTES # BLD AUTO: 0.9 X10(3) UL (ref 1–4)
LYMPHOCYTES NFR BLD AUTO: 11.9 %
MCH RBC QN AUTO: 27.9 PG (ref 26–34)
MCHC RBC AUTO-ENTMCNC: 31 G/DL (ref 31–37)
MCV RBC AUTO: 90 FL
MICROALBUMIN UR-MCNC: 31.6 MG/DL
MICROALBUMIN/CREAT 24H UR-RTO: 630.7 UG/MG (ref ?–30)
MONOCYTES # BLD AUTO: 0.61 X10(3) UL (ref 0.1–1)
MONOCYTES NFR BLD AUTO: 8.1 %
NEUTROPHILS # BLD AUTO: 5.82 X10 (3) UL (ref 1.5–7.7)
NEUTROPHILS # BLD AUTO: 5.82 X10(3) UL (ref 1.5–7.7)
NEUTROPHILS NFR BLD AUTO: 77.1 %
NITRITE UR QL STRIP.AUTO: NEGATIVE
OSMOLALITY SERPL CALC.SUM OF ELEC: 301 MOSM/KG (ref 275–295)
PH UR: 5.5 [PH] (ref 5–8)
PLATELET # BLD AUTO: 330 10(3)UL (ref 150–450)
POTASSIUM SERPL-SCNC: 4.7 MMOL/L (ref 3.5–5.1)
RBC # BLD AUTO: 2.51 X10(6)UL
SODIUM SERPL-SCNC: 142 MMOL/L (ref 136–145)
SODIUM SERPL-SCNC: 95 MMOL/L
SP GR UR STRIP: 1.01 (ref 1–1.03)
UROBILINOGEN UR STRIP-ACNC: 0.2
WBC # BLD AUTO: 7.6 X10(3) UL (ref 4–11)

## 2022-06-13 PROCEDURE — 93922 UPR/L XTREMITY ART 2 LEVELS: CPT | Performed by: HOSPITALIST

## 2022-06-13 PROCEDURE — 93926 LOWER EXTREMITY STUDY: CPT | Performed by: HOSPITALIST

## 2022-06-13 PROCEDURE — 99233 SBSQ HOSP IP/OBS HIGH 50: CPT | Performed by: HOSPITALIST

## 2022-06-13 PROCEDURE — 99223 1ST HOSP IP/OBS HIGH 75: CPT | Performed by: INTERNAL MEDICINE

## 2022-06-13 RX ORDER — METRONIDAZOLE 500 MG/1
500 TABLET ORAL EVERY 8 HOURS SCHEDULED
Status: DISCONTINUED | OUTPATIENT
Start: 2022-06-13 | End: 2022-06-16

## 2022-06-13 NOTE — PLAN OF CARE
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 - Provide timely, complete, and accurate information to patient/family  - Incorporate patient and family knowledge, values, beliefs, and cultural backgrounds into the planning and delivery of care  - Encourage patient/family to participate in care and decision-making at the level they choose  - Honor patient and family perspectives and choices  Outcome: Progressing     Problem: PAIN - ADULT  Goal: Verbalizes/displays adequate comfort level or patient's stated pain goal  Description: INTERVENTIONS:  - Encourage pt to monitor pain and request assistance  - Assess pain using appropriate pain scale  - Administer analgesics based on type and severity of pain and evaluate response  - Implement non-pharmacological measures as appropriate and evaluate response  - Consider cultural and social influences on pain and pain management  - Manage/alleviate anxiety  - Utilize distraction and/or relaxation techniques  - Monitor for opioid side effects  - Notify MD/LIP if interventions unsuccessful or patient reports new pain  - Anticipate increased pain with activity and pre-medicate as appropriate  Outcome: Progressing     Problem: CARDIOVASCULAR - ADULT  Goal: Maintains optimal cardiac output and hemodynamic stability  Description: INTERVENTIONS:  - Monitor vital signs, rhythm, and trends  - Monitor for bleeding, hypotension and signs of decreased cardiac output  - Evaluate effectiveness of vasoactive medications to optimize hemodynamic stability  - Monitor arterial and/or venous puncture sites for bleeding and/or hematoma  - Assess quality of pulses, skin color and temperature  - Assess for signs of decreased coronary artery perfusion - ex.  Angina  - Evaluate fluid balance, assess for edema, trend weights  Outcome: Progressing  Goal: Absence of cardiac arrhythmias or at baseline  Description: INTERVENTIONS:  - Continuous cardiac monitoring, monitor vital signs, obtain 12 lead EKG if indicated  - Evaluate effectiveness of antiarrhythmic and heart rate control medications as ordered  - Initiate emergency measures for life threatening arrhythmias  - Monitor electrolytes and administer replacement therapy as ordered  Outcome: Progressing     Problem: RISK FOR INFECTION - ADULT  Goal: Absence of fever/infection during anticipated neutropenic period  Description: INTERVENTIONS  - Monitor WBC  - Administer growth factors as ordered  - Implement neutropenic guidelines  Outcome: Progressing     Problem: SAFETY ADULT - FALL  Goal: Free from fall injury  Description: INTERVENTIONS:  - Assess pt frequently for physical needs  - Identify cognitive and physical deficits and behaviors that affect risk of falls.   - [place] fall precautions as indicated by assessment.  - Educate pt/family on patient safety including physical limitations  - Instruct pt to call for assistance with activity based on assessment  - Modify environment to reduce risk of injury  - Provide assistive devices as appropriate  - Consider OT/PT consult to assist with strengthening/mobility  - Encourage toileting schedule  Outcome: Progressing     Problem: DISCHARGE PLANNING  Goal: Discharge to home or other facility with appropriate resources  Description: INTERVENTIONS:  - Identify barriers to discharge w/pt and caregiver  - Include patient/family/discharge partner in discharge planning  - Arrange for needed discharge resources and transportation as appropriate  - Identify discharge learning needs (meds, wound care, etc)  - Arrange for interpreters to assist at discharge as needed  - Consider post-discharge preferences of patient/family/discharge partner  - Complete POLST form as appropriate  - Assess patient's ability to be responsible for managing their own health  - Refer to Case Management Department for coordinating discharge planning if the patient needs post-hospital services based on physician/LIP order or complex needs related to functional status, cognitive ability or social support system  Outcome: Progressing     Problem: Patient/Family Goals  Goal: Patient/Family Long Term Goal  Description: Patient's Long Term Goal: to go home    Interventions:  - Further testing  - Follow MD recommendations  - Medication compliance  - PT/OT  - See additional Care Plan goals for specific interventions  Outcome: Progressing  Goal: Patient/Family Short Term Goal  Description: Patient's Short Term Goal: Find out what is happening to my toes    Interventions:   - Further testing - MRI  - Medication compliance  - Follow MD recommendations  - See additional Care Plan goals for specific interventions  Outcome: Progressing   A&Ox4. US completed this AM-awaiting results. Angio planned for 3:30-pt unsure if she wants to proceed. IV abx. Will continue to monitor. Call light within reach.

## 2022-06-13 NOTE — PROGRESS NOTES
The patient's procedure will be canceled for tonight due to multiple emergencies in the Cath Lab which was pushed the start time beyond 7:00. I was able to secure an early start at 7 in the morning which I think would be more reasonable for her. Therefore we will feed her and make her n.p.o. after midnight. I discussed this plan with her and her son.

## 2022-06-13 NOTE — PROGRESS NOTES
[facility] Pharmacy Note:  Renal Adjustment for cefepime (MAXIPIME)    Cruz Bahena is a 80year old patient who has been prescribed cefepime (MAXIPIME) 1000 mg every 12 hrs. The estimated creatinine clearance is 11.4 mL/min (A) (based on SCr of 2.24 mg/dL (H)). The dose has been adjusted to cefepime (MAXIPIME) 1000 mg every 24 hrs per hospital renal dose adjustment protocol for treatment of cellulitis. Pharmacy will follow and adjust dose as warranted for additional renal function changes.     Thank you,    Lieutenant Saxena, PharmD  6/13/2022  10:09 AM

## 2022-06-14 ENCOUNTER — APPOINTMENT (OUTPATIENT)
Dept: INTERVENTIONAL RADIOLOGY/VASCULAR | Facility: HOSPITAL | Age: 87
End: 2022-06-14
Attending: SURGERY
Payer: MEDICARE

## 2022-06-14 ENCOUNTER — APPOINTMENT (OUTPATIENT)
Dept: GENERAL RADIOLOGY | Facility: HOSPITAL | Age: 87
End: 2022-06-14
Attending: INTERNAL MEDICINE
Payer: MEDICARE

## 2022-06-14 LAB
ALBUMIN SERPL-MCNC: 2 G/DL (ref 3.4–5)
ANION GAP SERPL CALC-SCNC: 8 MMOL/L (ref 0–18)
ANION GAP SERPL CALC-SCNC: 8 MMOL/L (ref 0–18)
BASOPHILS # BLD AUTO: 0.04 X10(3) UL (ref 0–0.2)
BASOPHILS NFR BLD AUTO: 0.5 %
BUN BLD-MCNC: 30 MG/DL (ref 7–18)
BUN BLD-MCNC: 31 MG/DL (ref 7–18)
BUN/CREAT SERPL: 14.7 (ref 10–20)
BUN/CREAT SERPL: 15 (ref 10–20)
CALCIUM BLD-MCNC: 8.3 MG/DL (ref 8.5–10.1)
CALCIUM BLD-MCNC: 8.5 MG/DL (ref 8.5–10.1)
CHLORIDE SERPL-SCNC: 118 MMOL/L (ref 98–112)
CHLORIDE SERPL-SCNC: 118 MMOL/L (ref 98–112)
CO2 SERPL-SCNC: 16 MMOL/L (ref 21–32)
CO2 SERPL-SCNC: 17 MMOL/L (ref 21–32)
CREAT BLD-MCNC: 2.04 MG/DL
CREAT BLD-MCNC: 2.07 MG/DL
DEPRECATED RDW RBC AUTO: 51.9 FL (ref 35.1–46.3)
EOSINOPHIL # BLD AUTO: 0.24 X10(3) UL (ref 0–0.7)
EOSINOPHIL NFR BLD AUTO: 3 %
ERYTHROCYTE [DISTWIDTH] IN BLOOD BY AUTOMATED COUNT: 15.5 % (ref 11–15)
GLUCOSE BLD-MCNC: 82 MG/DL (ref 70–99)
GLUCOSE BLD-MCNC: 86 MG/DL (ref 70–99)
HCT VFR BLD AUTO: 22.4 %
HEMOCCULT STL QL: POSITIVE
HGB BLD-MCNC: 6.8 G/DL
IMM GRANULOCYTES # BLD AUTO: 0.16 X10(3) UL (ref 0–1)
IMM GRANULOCYTES NFR BLD: 2 %
LYMPHOCYTES # BLD AUTO: 0.64 X10(3) UL (ref 1–4)
LYMPHOCYTES NFR BLD AUTO: 7.9 %
MAGNESIUM SERPL-MCNC: 2.1 MG/DL (ref 1.6–2.6)
MCH RBC QN AUTO: 27.6 PG (ref 26–34)
MCHC RBC AUTO-ENTMCNC: 30.4 G/DL (ref 31–37)
MCV RBC AUTO: 91.1 FL
MONOCYTES # BLD AUTO: 0.44 X10(3) UL (ref 0.1–1)
MONOCYTES NFR BLD AUTO: 5.4 %
NEUTROPHILS # BLD AUTO: 6.56 X10 (3) UL (ref 1.5–7.7)
NEUTROPHILS # BLD AUTO: 6.56 X10(3) UL (ref 1.5–7.7)
NEUTROPHILS NFR BLD AUTO: 81.2 %
OSMOLALITY SERPL CALC.SUM OF ELEC: 300 MOSM/KG (ref 275–295)
OSMOLALITY SERPL CALC.SUM OF ELEC: 301 MOSM/KG (ref 275–295)
PHOSPHATE SERPL-MCNC: 3.4 MG/DL (ref 2.5–4.9)
PLATELET # BLD AUTO: 312 10(3)UL (ref 150–450)
POTASSIUM SERPL-SCNC: 4.4 MMOL/L (ref 3.5–5.1)
POTASSIUM SERPL-SCNC: 4.4 MMOL/L (ref 3.5–5.1)
RBC # BLD AUTO: 2.46 X10(6)UL
SODIUM SERPL-SCNC: 142 MMOL/L (ref 136–145)
SODIUM SERPL-SCNC: 143 MMOL/L (ref 136–145)
VANCOMYCIN SERPL-MCNC: 13.6 UG/ML (ref ?–40)
WBC # BLD AUTO: 8.1 X10(3) UL (ref 4–11)

## 2022-06-14 PROCEDURE — B41D1ZZ FLUOROSCOPY OF AORTA AND BILATERAL LOWER EXTREMITY ARTERIES USING LOW OSMOLAR CONTRAST: ICD-10-PCS | Performed by: SURGERY

## 2022-06-14 PROCEDURE — 047T3Z1 DILATION OF RIGHT PERONEAL ARTERY USING DRUG-COATED BALLOON, PERCUTANEOUS APPROACH: ICD-10-PCS | Performed by: SURGERY

## 2022-06-14 PROCEDURE — 047M3Z1 DILATION OF RIGHT POPLITEAL ARTERY USING DRUG-COATED BALLOON, PERCUTANEOUS APPROACH: ICD-10-PCS | Performed by: SURGERY

## 2022-06-14 PROCEDURE — 99233 SBSQ HOSP IP/OBS HIGH 50: CPT | Performed by: HOSPITALIST

## 2022-06-14 PROCEDURE — 99233 SBSQ HOSP IP/OBS HIGH 50: CPT | Performed by: INTERNAL MEDICINE

## 2022-06-14 PROCEDURE — 71045 X-RAY EXAM CHEST 1 VIEW: CPT | Performed by: INTERNAL MEDICINE

## 2022-06-14 RX ORDER — HEPARIN SODIUM 1000 [USP'U]/ML
INJECTION, SOLUTION INTRAVENOUS; SUBCUTANEOUS
Status: COMPLETED
Start: 2022-06-14 | End: 2022-06-14

## 2022-06-14 RX ORDER — CLOPIDOGREL BISULFATE 75 MG/1
300 TABLET ORAL ONCE
Status: DISCONTINUED | OUTPATIENT
Start: 2022-06-14 | End: 2022-06-15 | Stop reason: HOSPADM

## 2022-06-14 RX ORDER — SODIUM CHLORIDE 0.9 % (FLUSH) 0.9 %
10 SYRINGE (ML) INJECTION AS NEEDED
Status: DISCONTINUED | OUTPATIENT
Start: 2022-06-14 | End: 2022-06-15 | Stop reason: HOSPADM

## 2022-06-14 RX ORDER — CLOPIDOGREL BISULFATE 75 MG/1
75 TABLET ORAL DAILY
Status: DISCONTINUED | OUTPATIENT
Start: 2022-06-15 | End: 2022-06-15 | Stop reason: HOSPADM

## 2022-06-14 RX ORDER — AMLODIPINE BESYLATE 5 MG/1
5 TABLET ORAL DAILY
Status: DISCONTINUED | OUTPATIENT
Start: 2022-06-14 | End: 2022-06-15

## 2022-06-14 RX ORDER — SODIUM CHLORIDE 9 MG/ML
INJECTION, SOLUTION INTRAVENOUS CONTINUOUS
Status: DISCONTINUED | OUTPATIENT
Start: 2022-06-14 | End: 2022-06-14

## 2022-06-14 RX ORDER — MIDAZOLAM HYDROCHLORIDE 1 MG/ML
INJECTION INTRAMUSCULAR; INTRAVENOUS
Status: COMPLETED
Start: 2022-06-14 | End: 2022-06-14

## 2022-06-14 RX ORDER — CLOPIDOGREL BISULFATE 75 MG/1
TABLET ORAL
Status: COMPLETED
Start: 2022-06-14 | End: 2022-06-14

## 2022-06-14 RX ORDER — HYDRALAZINE HYDROCHLORIDE 20 MG/ML
INJECTION INTRAMUSCULAR; INTRAVENOUS
Status: COMPLETED
Start: 2022-06-14 | End: 2022-06-14

## 2022-06-14 RX ORDER — PROTAMINE SULFATE 10 MG/ML
INJECTION, SOLUTION INTRAVENOUS
Status: COMPLETED
Start: 2022-06-14 | End: 2022-06-14

## 2022-06-14 RX ORDER — LIDOCAINE HYDROCHLORIDE 20 MG/ML
INJECTION, SOLUTION INFILTRATION; PERINEURAL
Status: COMPLETED
Start: 2022-06-14 | End: 2022-06-14

## 2022-06-14 NOTE — CONSULTS
[facility]    PATIENT'S NAME: Lynn Segovia   ATTENDING PHYSICIAN: Pooja Rasmussen MD   CONSULTING PHYSICIAN: Jocelyn Trammell MD   PATIENT ACCOUNT#:   [de-identified]    LOCATION:  [address] [place] [address] #:   [id number]       YOB: 1925  ADMISSION DATE:       06/10/2022      CONSULT DATE:  06/13/2022    REPORT OF CONSULTATION      HISTORY OF PRESENT ILLNESS:  The patient is a 80-year-old female with a history of hypertension, newly diagnosed atrial fibrillation, chronic kidney disease at least stage 3, who presented on Leighann 10, 2022 with gangrenous changes in her right fourth and fifth toes. She was also having pain in that area, but no obvious fevers or chills. When the patient arrived, she was noted to have a BUN and creatinine of 36 and 2.26 respectively. Creatinine was slightly better yesterday at 2.18 but today it is 2.24, and renal consultation has now been called. The only previous laboratory studies I could find was done on September 26, 2018 when she had a creatinine 1.42 with an estimated GFR of 32 mL/min. There were some orders in the computer of followup laboratory studies but apparently they were never done. PAST MEDICAL HISTORY:  Significant for longstanding hypertension, DJD, GERD, anemia, and now has this new-onset atrial fibrillation. The patient has been seen by Vascular Surgery, and they are now recommending an angiogram of her right lower extremity. She had Doppler arterial blood flow studies which have not yet been read. MEDICATIONS:  Her current medications include Eliquis, atenolol, cefepime, metronidazole, Protonix, vancomycin, and oral vancomycin. ALLERGIES:  There are no known allergies. SOCIAL HISTORY:  Nonsmoker and is fairly bedbound. Retired pediatrician. She is a nonsmoker. FAMILY HISTORY:  Negative for renal pathology.     REVIEW OF SYSTEMS:  The patient states she feels okay without any chest pain, shortness of breath, GI or urinary tract symptoms. Pain in her right foot is better. PHYSICAL EXAMINATION:    GENERAL:  The patient is awake and alert, but hard of hearing. VITAL SIGNS:  Blood pressure of 156/82 with a pulse of 79, respirations 18, temperature 98 degrees, O2 saturation was 97% on room air, and she weighed 124 pounds. HEENT:  Mucous membranes moist.  Skin turgor was adequate. NECK:  Supple without JVD or lymphadenopathy. LUNGS:  Clear to auscultation and percussion. HEART:  Irregularly irregular rhythm. ABDOMEN:  Soft, flat, nontender without organomegaly, masses, or bruits. EXTREMITIES:  No edema. LABORATORY DATA:  Today shows a BUN creatinine of 35 and 2.24 respectively with a CO2 of 19, potassium 4.7. White count 7.6, hemoglobin 7.0, hematocrit 22.6. Iron studies were low. Urine studies are pending. Doppler arterial blood flow studies are pending. EKG shows atrial fibrillation. X-ray of her foot reveals no obvious osteomyelitis. IMPRESSION:  The patient is a 41-year-old female now with:    1. Chronic kidney disease. She may have just progressed to chronic kidney disease stage 4. There has been no real improvement in her renal function with IV fluids. The last creatinine I could find was 1.42 with an estimated GFR of 32 mL/min back on September 26, 2018. 2.   Hypertension. 3.   Iron deficiency anemia. 4.   Degenerative joint disease. 5.   New-onset atrial fibrillation. 6.   Iron deficiency anemia. PLAN:  Will continue gentle IV hydration. Will follow with I's and O's, daily weights and serial chemistries. Ordered urinalysis along with random urine microalbumin, random urine sodium and creatinine. A renal ultrasound has been ordered for completion sake. The patient though will be at increased risk for contrast-induced nephropathy. Discussed with Nursing and Dr. Lucia Ramirez.     Dictated By Myriam Anand MD  d: 06/13/2022 19:49:02  t: 06/13/2022 [address] [place] [address]

## 2022-06-14 NOTE — PLAN OF CARE
Ivf at 83 ml/hr. Iv abx continues. Npo since midnight for right leg angiogram today. [place]xt x-ray portable this am. Will continue to monitor. 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   - Provide timely, complete, and accurate information to patient/family  - Incorporate patient and family knowledge, values, beliefs, and cultural backgrounds into the planning and delivery of care  - Encourage patient/family to participate in care and decision-making at the level they choose  - Honor patient and family perspectives and choices  Outcome: Progressing     Problem: PAIN - ADULT  Goal: Verbalizes/displays adequate comfort level or patient's stated pain goal  Description: INTERVENTIONS:  - Encourage pt to monitor pain and request assistance  - Assess pain using appropriate pain scale  - Administer analgesics based on type and severity of pain and evaluate response  - Implement non-pharmacological measures as appropriate and evaluate response  - Consider cultural and social influences on pain and pain management  - Manage/alleviate anxiety  - Utilize distraction and/or relaxation techniques  - Monitor for opioid side effects  - Notify MD/LIP if interventions unsuccessful or patient reports new pain  - Anticipate increased pain with activity and pre-medicate as appropriate  Outcome: Progressing     Problem: CARDIOVASCULAR - ADULT  Goal: Maintains optimal cardiac output and hemodynamic stability  Description: INTERVENTIONS:  - Monitor vital signs, rhythm, and trends  - Monitor for bleeding, hypotension and signs of decreased cardiac output  - Evaluate effectiveness of vasoactive medications to optimize hemodynamic stability  - Monitor arterial and/or venous puncture sites for bleeding and/or hematoma  - Assess quality of pulses, skin color and temperature  - Assess for signs of decreased coronary artery perfusion - ex. Angina  - Evaluate fluid balance, assess for edema, trend weights  Outcome: Progressing  Goal: Absence of cardiac arrhythmias or at baseline  Description: INTERVENTIONS:  - Continuous cardiac monitoring, monitor vital signs, obtain 12 lead EKG if indicated  - Evaluate effectiveness of antiarrhythmic and heart rate control medications as ordered  - Initiate emergency measures for life threatening arrhythmias  - Monitor electrolytes and administer replacement therapy as ordered  Outcome: Progressing     Problem: RISK FOR INFECTION - ADULT  Goal: Absence of fever/infection during anticipated neutropenic period  Description: INTERVENTIONS  - Monitor WBC  - Administer growth factors as ordered  - Implement neutropenic guidelines  Outcome: Progressing     Problem: SAFETY ADULT - FALL  Goal: Free from fall injury  Description: INTERVENTIONS:  - Assess pt frequently for physical needs  - Identify cognitive and physical deficits and behaviors that affect risk of falls.   - [place] fall precautions as indicated by assessment.  - Educate pt/family on patient safety including physical limitations  - Instruct pt to call for assistance with activity based on assessment  - Modify environment to reduce risk of injury  - Provide assistive devices as appropriate  - Consider OT/PT consult to assist with strengthening/mobility  - Encourage toileting schedule  Outcome: Progressing     Problem: DISCHARGE PLANNING  Goal: Discharge to home or other facility with appropriate resources  Description: INTERVENTIONS:  - Identify barriers to discharge w/pt and caregiver  - Include patient/family/discharge partner in discharge planning  - Arrange for needed discharge resources and transportation as appropriate  - Identify discharge learning needs (meds, wound care, etc)  - Arrange for interpreters to assist at discharge as needed  - Consider post-discharge preferences of patient/family/discharge partner  - Complete POLST form as appropriate  - Assess patient's ability to be responsible for managing their own health  - Refer to Case Management Department for coordinating discharge planning if the patient needs post-hospital services based on physician/LIP order or complex needs related to functional status, cognitive ability or social support system  Outcome: Progressing     Problem: Patient/Family Goals  Goal: Patient/Family Long Term Goal  Description: Patient's Long Term Goal: to go home    Interventions:  - Further testing  - Follow MD recommendations  - Medication compliance  - PT/OT  - See additional Care Plan goals for specific interventions  Outcome: Progressing  Goal: Patient/Family Short Term Goal  Description: Patient's Short Term Goal: Find out what is happening to my toes    Interventions:   - Further testing - MRI  - Medication compliance  - Follow MD recommendations  - See additional Care Plan goals for specific interventions  Outcome: Progressing

## 2022-06-14 NOTE — PROGRESS NOTES
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 Patient is on day 5 of vancomycin and is currently receiving 1000 mg Q 48 hours. Labs:  Lab Results   Component Value Date    CREATSERUM 2.04 06/14/2022    CREATSERUM 2.07 06/14/2022      CrCl:  Estimated Creatinine Clearance: 12.5 mL/min (A) (based on SCr of 2.04 mg/dL (H)). Levels:  trough: 13.6 mcg/mL    Based on the above:    1. Continue Vancomycin at 1000 mg IVPB Q 48 hours based on pharmacokinetics and renal function. 2.  Will re-check vancomycin trough level(s) prior to 3rd dose. Goal trough is 10-15 mcg/mL unless otherwise noted by ordering provider. 3.  Pharmacy will order SCr as clinically indicated while on vancomycin to assess renal function. 4. Pharmacy will follow and monitor renal function, toxicity and efficacy. We appreciate the opportunity to assist in the care of this patient.     Sierra Herring, [facility]  6/14/2022  11:35 AM  [facility]  Pharmacy Extension: [phone]

## 2022-06-14 NOTE — OPERATIVE REPORT
[facility]    PATIENTS NAME: Dionicio Call  ATTENDING PHYSICIAN: Katelin Carrasquillo MD  OPERATING PHYSICIAN: Prabhu Rodríguez MD  CSN: [id number]     LOCATION:  CATH LAB  MRN: [id number]    YOB: 1925  ADMISSION DATE: 6/10/2022  OPERATION DATE: 6/14/2022                CATH LAB PROCEDURE REPORT       PRE-OPERATIVE DIAGNOSIS:  right lower extremity atherosclerosis with gangrene     POST-OPERATIVE DIAGNOSIS: Same as above. PROCEDURE PERFORMED:   Angioplasty of the right popliteal artery (DCB, inPact Admiral 5 x 80 mm)  Angioplasty of the right distal peroneal artery    ANESTHESIA: Moderate conscious sedation using Versed and Fentanyl was provided. The RN monitored the oxygen, heart rate and blood pressure under my direct supervision during the course of the procedure. SURGEON: Prabhu Rodríguez MD    SEDATION TIME: 44 min    SEDATION MEDICATION:   Versed: 2 mg  Fentanyl: 125 mcg    CONTRAST AMOUNT: 30 ml    EBL: 10 ml    COMPLICATIONS: None    SUMMARY OF CASE: tight focal stenosis of the popliteal artery and the distal peroneal artery (single vessel run-off). Manual hold. INDICATIONS: The patient is a 80year old female with right lower extremity atherosclerosis with gangrene. She was felt to be an acceptable candidate for endovascular revascularization. We discussed the benefits of the angiogram and the risks of acute thrombosis, distal embolization, nerve injury, restenosis, dissection or poorly controlled bleeding (which may be manifested as free hemorrhage or contained hematoma in the femoral or retroperitoneal areas), pseudoaneurysm, or arteriovenous fistula, infection, renal function worsening and need for open surgery among other complications. Hemorrhage and hematoma are usually evident within 12 hours after the procedure while other complications, such as pseudoaneurysm and arteriovenous fistula, may not become apparent for days to weeks afterward.   Patient understood and all questions were answered. DESCRIPTION OF PROCEDURE:  The patient was brought to the catheterization lab and laid supine on the table. After induction of sedation, the groin areas were prepped and draped in the usual surgical sterile fashion. A micropuncture needle was then used to access the left common femoral artery after the overlying skin area was infiltrated with 1% lidocaine solution. A micropuncture wire was then advanced and followed by placement of a 4-Scottish micro sheath which was then exchanged to a 5-Scottish sheath over a Glidewire Advantage wire. An IM catheter was then advanced into the distal aorta where a quick distal aortic angiogram was performed. This revealed patency of the aorta, both common iliac, internal and external iliac arteries. There was moderate calcification present but no stenosis. The IM catheter was then maneuvered down into the right common femoral artery area, and an angiogram was performed in that location and this revealed patency of the common femoral, profunda, and superficial femoral arteries. Then, imaging distally revealed a tight focal stenosis of the popliteal artery and the distal peroneal arteries. There was a single vessel run-off but the perfusion to the foot was good. Therefore, an intervention was felt to be possible and the sheath was then exchanged to a 6-Scottish 45 cm long Terumo sheath which was then advanced and parked in the proximal superficial femoral artery. The patient was anticoagulated with 80 units/kg of heparin, and using an 0.035 Glidewire advantage wire, we were able to cross the area of stenosis in the popliteal artery and advance the wire beyond the area of stenosis. Then, I proceeded with angioplasty of the popliteal artery using a drug-coated balloon (inPact Admiral 5 x 80 mm) for three minutes with excellent results.   Then, an 014 Glidewire advantage wire was then advanced all the way down to the distal peroneal artery and the severe stenosis noted right at the bifurcation of the artery was then treated with a 2.5 x 40 mm coyote balloon with excellent results. At this point, it was felt that the patient has been maximally revascularized. She was given 50 mg of protamine. The sheath was then pulled back to the left iliac artery and an angiogram of the femoral artery access area revealed proper access in the mid common femoral area. Therefore, the sheath was pulled and a Perclose device manual pressure used after the Perclose failed. Pressure was held until hemostasis was achieved. There were no complications.

## 2022-06-14 NOTE — PLAN OF CARE
Patient switched over from NS to Sodium Bicarb IVF. Pt refusing kidney US. Patient to go for right 4th and 5th toe amputation tomorrow afternoon. Consents signed. Pt went for right arteriogram this morning. Left groin site with no drainage, dressing intact. Hgb 6.8. Currently refusing blood transfusion. MD aware. 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   - Provide timely, complete, and accurate information to patient/family  - Incorporate patient and family knowledge, values, beliefs, and cultural backgrounds into the planning and delivery of care  - Encourage patient/family to participate in care and decision-making at the level they choose  - Honor patient and family perspectives and choices  Outcome: Progressing     Problem: PAIN - ADULT  Goal: Verbalizes/displays adequate comfort level or patient's stated pain goal  Description: INTERVENTIONS:  - Encourage pt to monitor pain and request assistance  - Assess pain using appropriate pain scale  - Administer analgesics based on type and severity of pain and evaluate response  - Implement non-pharmacological measures as appropriate and evaluate response  - Consider cultural and social influences on pain and pain management  - Manage/alleviate anxiety  - Utilize distraction and/or relaxation techniques  - Monitor for opioid side effects  - Notify MD/LIP if interventions unsuccessful or patient reports new pain  - Anticipate increased pain with activity and pre-medicate as appropriate  Outcome: Progressing     Problem: CARDIOVASCULAR - ADULT  Goal: Maintains optimal cardiac output and hemodynamic stability  Description: INTERVENTIONS:  - Monitor vital signs, rhythm, and trends  - Monitor for bleeding, hypotension and signs of decreased cardiac output  - Evaluate effectiveness of vasoactive medications to optimize hemodynamic stability  - Monitor arterial and/or venous puncture sites for bleeding and/or hematoma  - Assess quality of pulses, skin color and temperature  - Assess for signs of decreased coronary artery perfusion - ex. Angina  - Evaluate fluid balance, assess for edema, trend weights  Outcome: Progressing  Goal: Absence of cardiac arrhythmias or at baseline  Description: INTERVENTIONS:  - Continuous cardiac monitoring, monitor vital signs, obtain 12 lead EKG if indicated  - Evaluate effectiveness of antiarrhythmic and heart rate control medications as ordered  - Initiate emergency measures for life threatening arrhythmias  - Monitor electrolytes and administer replacement therapy as ordered  Outcome: Progressing     Problem: RISK FOR INFECTION - ADULT  Goal: Absence of fever/infection during anticipated neutropenic period  Description: INTERVENTIONS  - Monitor WBC  - Administer growth factors as ordered  - Implement neutropenic guidelines  Outcome: Progressing     Problem: SAFETY ADULT - FALL  Goal: Free from fall injury  Description: INTERVENTIONS:  - Assess pt frequently for physical needs  - Identify cognitive and physical deficits and behaviors that affect risk of falls.   - [place] fall precautions as indicated by assessment.  - Educate pt/family on patient safety including physical limitations  - Instruct pt to call for assistance with activity based on assessment  - Modify environment to reduce risk of injury  - Provide assistive devices as appropriate  - Consider OT/PT consult to assist with strengthening/mobility  - Encourage toileting schedule  Outcome: Progressing     Problem: DISCHARGE PLANNING  Goal: Discharge to home or other facility with appropriate resources  Description: INTERVENTIONS:  - Identify barriers to discharge w/pt and caregiver  - Include patient/family/discharge partner in discharge planning  - Arrange for needed discharge resources and transportation as appropriate  - Identify discharge learning needs (meds, wound care, etc)  - Arrange for interpreters to assist at discharge as needed  - Consider post-discharge preferences of patient/family/discharge partner  - Complete POLST form as appropriate  - Assess patient's ability to be responsible for managing their own health  - Refer to Case Management Department for coordinating discharge planning if the patient needs post-hospital services based on physician/LIP order or complex needs related to functional status, cognitive ability or social support system  Outcome: Progressing     Problem: Patient/Family Goals  Goal: Patient/Family Long Term Goal  Description: Patient's Long Term Goal: to go home    Interventions:  - Further testing  - Follow MD recommendations  - Medication compliance  - PT/OT  - See additional Care Plan goals for specific interventions  Outcome: Progressing  Goal: Patient/Family Short Term Goal  Description: Patient's Short Term Goal: Find out what is happening to my toes    Interventions:   - Further testing - MRI  - Medication compliance  - Follow MD recommendations  - See additional Care Plan goals for specific interventions  Outcome: Progressing

## 2022-06-15 ENCOUNTER — ANESTHESIA (OUTPATIENT)
Dept: SURGERY | Facility: HOSPITAL | Age: 87
End: 2022-06-15
Payer: MEDICARE

## 2022-06-15 ENCOUNTER — APPOINTMENT (OUTPATIENT)
Dept: GENERAL RADIOLOGY | Facility: HOSPITAL | Age: 87
End: 2022-06-15
Attending: PODIATRIST
Payer: MEDICARE

## 2022-06-15 ENCOUNTER — ANESTHESIA EVENT (OUTPATIENT)
Dept: SURGERY | Facility: HOSPITAL | Age: 87
End: 2022-06-15
Payer: MEDICARE

## 2022-06-15 LAB
ALBUMIN SERPL-MCNC: 2 G/DL (ref 3.4–5)
ANION GAP SERPL CALC-SCNC: 10 MMOL/L (ref 0–18)
BASOPHILS # BLD AUTO: 0.05 X10(3) UL (ref 0–0.2)
BASOPHILS NFR BLD AUTO: 0.7 %
BUN BLD-MCNC: 29 MG/DL (ref 7–18)
BUN/CREAT SERPL: 15 (ref 10–20)
CALCIUM BLD-MCNC: 8.3 MG/DL (ref 8.5–10.1)
CHLORIDE SERPL-SCNC: 115 MMOL/L (ref 98–112)
CO2 SERPL-SCNC: 17 MMOL/L (ref 21–32)
CREAT BLD-MCNC: 1.93 MG/DL
DEPRECATED RDW RBC AUTO: 52.1 FL (ref 35.1–46.3)
EOSINOPHIL # BLD AUTO: 0.3 X10(3) UL (ref 0–0.7)
EOSINOPHIL NFR BLD AUTO: 4 %
ERYTHROCYTE [DISTWIDTH] IN BLOOD BY AUTOMATED COUNT: 15.6 % (ref 11–15)
GLUCOSE BLD-MCNC: 84 MG/DL (ref 70–99)
HCT VFR BLD AUTO: 20.3 %
HGB BLD-MCNC: 6.1 G/DL
IMM GRANULOCYTES # BLD AUTO: 0.06 X10(3) UL (ref 0–1)
IMM GRANULOCYTES NFR BLD: 0.8 %
LYMPHOCYTES # BLD AUTO: 0.67 X10(3) UL (ref 1–4)
LYMPHOCYTES NFR BLD AUTO: 8.8 %
MAGNESIUM SERPL-MCNC: 2 MG/DL (ref 1.6–2.6)
MCH RBC QN AUTO: 27.6 PG (ref 26–34)
MCHC RBC AUTO-ENTMCNC: 30 G/DL (ref 31–37)
MCV RBC AUTO: 91.9 FL
MONOCYTES # BLD AUTO: 0.66 X10(3) UL (ref 0.1–1)
MONOCYTES NFR BLD AUTO: 8.7 %
NEUTROPHILS # BLD AUTO: 5.84 X10 (3) UL (ref 1.5–7.7)
NEUTROPHILS # BLD AUTO: 5.84 X10(3) UL (ref 1.5–7.7)
NEUTROPHILS NFR BLD AUTO: 77 %
OSMOLALITY SERPL CALC.SUM OF ELEC: 299 MOSM/KG (ref 275–295)
PHOSPHATE SERPL-MCNC: 3 MG/DL (ref 2.5–4.9)
PLATELET # BLD AUTO: 303 10(3)UL (ref 150–450)
POTASSIUM SERPL-SCNC: 4.3 MMOL/L (ref 3.5–5.1)
RBC # BLD AUTO: 2.21 X10(6)UL
SARS-COV-2 RNA RESP QL NAA+PROBE: NOT DETECTED
SODIUM SERPL-SCNC: 142 MMOL/L (ref 136–145)
WBC # BLD AUTO: 7.6 X10(3) UL (ref 4–11)

## 2022-06-15 PROCEDURE — 0Y6X0Z0 DETACHMENT AT RIGHT 5TH TOE, COMPLETE, OPEN APPROACH: ICD-10-PCS | Performed by: PODIATRIST

## 2022-06-15 PROCEDURE — 0Y6V0Z0 DETACHMENT AT RIGHT 4TH TOE, COMPLETE, OPEN APPROACH: ICD-10-PCS | Performed by: PODIATRIST

## 2022-06-15 PROCEDURE — 99232 SBSQ HOSP IP/OBS MODERATE 35: CPT | Performed by: INTERNAL MEDICINE

## 2022-06-15 PROCEDURE — 99233 SBSQ HOSP IP/OBS HIGH 50: CPT | Performed by: HOSPITALIST

## 2022-06-15 PROCEDURE — 73630 X-RAY EXAM OF FOOT: CPT | Performed by: PODIATRIST

## 2022-06-15 RX ORDER — LIDOCAINE HYDROCHLORIDE 10 MG/ML
INJECTION, SOLUTION EPIDURAL; INFILTRATION; INTRACAUDAL; PERINEURAL AS NEEDED
Status: DISCONTINUED | OUTPATIENT
Start: 2022-06-15 | End: 2022-06-15 | Stop reason: HOSPADM

## 2022-06-15 RX ORDER — MORPHINE SULFATE 4 MG/ML
2 INJECTION, SOLUTION INTRAMUSCULAR; INTRAVENOUS EVERY 10 MIN PRN
Status: DISCONTINUED | OUTPATIENT
Start: 2022-06-15 | End: 2022-06-15 | Stop reason: HOSPADM

## 2022-06-15 RX ORDER — SODIUM CHLORIDE, SODIUM LACTATE, POTASSIUM CHLORIDE, CALCIUM CHLORIDE 600; 310; 30; 20 MG/100ML; MG/100ML; MG/100ML; MG/100ML
INJECTION, SOLUTION INTRAVENOUS CONTINUOUS PRN
Status: DISCONTINUED | OUTPATIENT
Start: 2022-06-15 | End: 2022-06-15 | Stop reason: SURG

## 2022-06-15 RX ORDER — HYDROMORPHONE HYDROCHLORIDE 1 MG/ML
0.2 INJECTION, SOLUTION INTRAMUSCULAR; INTRAVENOUS; SUBCUTANEOUS EVERY 5 MIN PRN
Status: DISCONTINUED | OUTPATIENT
Start: 2022-06-15 | End: 2022-06-15 | Stop reason: HOSPADM

## 2022-06-15 RX ORDER — MORPHINE SULFATE 10 MG/ML
6 INJECTION, SOLUTION INTRAMUSCULAR; INTRAVENOUS EVERY 10 MIN PRN
Status: DISCONTINUED | OUTPATIENT
Start: 2022-06-15 | End: 2022-06-15 | Stop reason: HOSPADM

## 2022-06-15 RX ORDER — SODIUM CHLORIDE, SODIUM LACTATE, POTASSIUM CHLORIDE, CALCIUM CHLORIDE 600; 310; 30; 20 MG/100ML; MG/100ML; MG/100ML; MG/100ML
INJECTION, SOLUTION INTRAVENOUS CONTINUOUS
Status: DISCONTINUED | OUTPATIENT
Start: 2022-06-15 | End: 2022-06-15 | Stop reason: HOSPADM

## 2022-06-15 RX ORDER — HYDROMORPHONE HYDROCHLORIDE 1 MG/ML
0.4 INJECTION, SOLUTION INTRAMUSCULAR; INTRAVENOUS; SUBCUTANEOUS EVERY 5 MIN PRN
Status: DISCONTINUED | OUTPATIENT
Start: 2022-06-15 | End: 2022-06-15 | Stop reason: HOSPADM

## 2022-06-15 RX ORDER — METOPROLOL TARTRATE 5 MG/5ML
2.5 INJECTION INTRAVENOUS ONCE
Status: DISCONTINUED | OUTPATIENT
Start: 2022-06-15 | End: 2022-06-15 | Stop reason: HOSPADM

## 2022-06-15 RX ORDER — NALOXONE HYDROCHLORIDE 0.4 MG/ML
80 INJECTION, SOLUTION INTRAMUSCULAR; INTRAVENOUS; SUBCUTANEOUS AS NEEDED
Status: DISCONTINUED | OUTPATIENT
Start: 2022-06-15 | End: 2022-06-15 | Stop reason: HOSPADM

## 2022-06-15 RX ORDER — HYDROMORPHONE HYDROCHLORIDE 1 MG/ML
0.6 INJECTION, SOLUTION INTRAMUSCULAR; INTRAVENOUS; SUBCUTANEOUS EVERY 5 MIN PRN
Status: DISCONTINUED | OUTPATIENT
Start: 2022-06-15 | End: 2022-06-15 | Stop reason: HOSPADM

## 2022-06-15 RX ORDER — BUPIVACAINE HYDROCHLORIDE 2.5 MG/ML
INJECTION, SOLUTION EPIDURAL; INFILTRATION; INTRACAUDAL AS NEEDED
Status: DISCONTINUED | OUTPATIENT
Start: 2022-06-15 | End: 2022-06-15 | Stop reason: HOSPADM

## 2022-06-15 RX ORDER — NICOTINE POLACRILEX 4 MG
15 LOZENGE BUCCAL ONCE
Status: DISCONTINUED | OUTPATIENT
Start: 2022-06-15 | End: 2022-06-18

## 2022-06-15 RX ORDER — MORPHINE SULFATE 4 MG/ML
4 INJECTION, SOLUTION INTRAMUSCULAR; INTRAVENOUS EVERY 10 MIN PRN
Status: DISCONTINUED | OUTPATIENT
Start: 2022-06-15 | End: 2022-06-15 | Stop reason: HOSPADM

## 2022-06-15 RX ORDER — CLOPIDOGREL BISULFATE 75 MG/1
75 TABLET ORAL DAILY
Status: DISCONTINUED | OUTPATIENT
Start: 2022-06-16 | End: 2022-06-18

## 2022-06-15 RX ORDER — AMLODIPINE BESYLATE 10 MG/1
10 TABLET ORAL DAILY
Status: DISCONTINUED | OUTPATIENT
Start: 2022-06-15 | End: 2022-06-18

## 2022-06-15 RX ADMIN — SODIUM CHLORIDE, SODIUM LACTATE, POTASSIUM CHLORIDE, CALCIUM CHLORIDE: 600; 310; 30; 20 INJECTION, SOLUTION INTRAVENOUS at 13:43:00

## 2022-06-15 RX ADMIN — SODIUM CHLORIDE, SODIUM LACTATE, POTASSIUM CHLORIDE, CALCIUM CHLORIDE: 600; 310; 30; 20 INJECTION, SOLUTION INTRAVENOUS at 13:12:00

## 2022-06-15 NOTE — PROGRESS NOTES
Patients Hemoglobin is 6.1 this morning. Pt continues to refuse blood transfusion. MD is aware and notified of critical hemoglobin.

## 2022-06-15 NOTE — PROGRESS NOTES
Patient seen in preop today, we discussed low hemoglobin and associated risk with anesthesia as well as increased risk of postoperative complications to the foot including non-healing, infection. The patient relates that she want to have surgery but does not want to have a transfusion as she believes that if it is her time to die, it is her time to die. I told the patient that we can also elect to not proceed with surgery and rather treat the toes conservatively with antibiotics. Patient became slightly agitated, as she thought this meant that I was refusing to perform her surgery. I told her that I will perform her surgery if that is what she wishes, but that I want to make sure she is fully aware of the risks of the decreased hemoglobin. Patient then mentioned speaking with her son who is also a physician. I tried to reach Dr. Barby Watson and left a VM asking for callback so that we can ensure we are all on the same page and have an understanding and agreement on the plan for Dr. Marlena Cintron.  ----  Dr. Barby Watson - patient's son, returned my call and related that he is aware of the patients low hemoglobin and we discussed the underlying cause and history. He relates that he understands the risks and that he is also in agreement that we proceed with surgery. He was on a meeting at time of my call but called back again soon after for any additional questions and spoke with anesthesiology Dr. Jory Villa. We are clear to proceed with surgery, risks and possible complications are understood.

## 2022-06-15 NOTE — OCCUPATIONAL THERAPY NOTE
Patient is scheduled for toe amputations today; will hold evaluation today- will need new orders post operatively with updated activity orders and weight bearing status. Discussed with RAND Andrade. Will continue to follow.     [address], OTR/L ext [phone]

## 2022-06-15 NOTE — PLAN OF CARE
Npo since midnight for right 4th and 5th toe amputation today. Ivf, iv abx continues. Will continue to monitor. 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   - Provide timely, complete, and accurate information to patient/family  - Incorporate patient and family knowledge, values, beliefs, and cultural backgrounds into the planning and delivery of care  - Encourage patient/family to participate in care and decision-making at the level they choose  - Honor patient and family perspectives and choices  Outcome: Progressing     Problem: PAIN - ADULT  Goal: Verbalizes/displays adequate comfort level or patient's stated pain goal  Description: INTERVENTIONS:  - Encourage pt to monitor pain and request assistance  - Assess pain using appropriate pain scale  - Administer analgesics based on type and severity of pain and evaluate response  - Implement non-pharmacological measures as appropriate and evaluate response  - Consider cultural and social influences on pain and pain management  - Manage/alleviate anxiety  - Utilize distraction and/or relaxation techniques  - Monitor for opioid side effects  - Notify MD/LIP if interventions unsuccessful or patient reports new pain  - Anticipate increased pain with activity and pre-medicate as appropriate  Outcome: Progressing     Problem: CARDIOVASCULAR - ADULT  Goal: Maintains optimal cardiac output and hemodynamic stability  Description: INTERVENTIONS:  - Monitor vital signs, rhythm, and trends  - Monitor for bleeding, hypotension and signs of decreased cardiac output  - Evaluate effectiveness of vasoactive medications to optimize hemodynamic stability  - Monitor arterial and/or venous puncture sites for bleeding and/or hematoma  - Assess quality of pulses, skin color and temperature  - Assess for signs of decreased coronary artery perfusion - ex. Angina  - Evaluate fluid balance, assess for edema, trend weights  Outcome: Progressing  Goal: Absence of cardiac arrhythmias or at baseline  Description: INTERVENTIONS:  - Continuous cardiac monitoring, monitor vital signs, obtain 12 lead EKG if indicated  - Evaluate effectiveness of antiarrhythmic and heart rate control medications as ordered  - Initiate emergency measures for life threatening arrhythmias  - Monitor electrolytes and administer replacement therapy as ordered  Outcome: Progressing     Problem: RISK FOR INFECTION - ADULT  Goal: Absence of fever/infection during anticipated neutropenic period  Description: INTERVENTIONS  - Monitor WBC  - Administer growth factors as ordered  - Implement neutropenic guidelines  Outcome: Progressing     Problem: SAFETY ADULT - FALL  Goal: Free from fall injury  Description: INTERVENTIONS:  - Assess pt frequently for physical needs  - Identify cognitive and physical deficits and behaviors that affect risk of falls.   - [facility] fall precautions as indicated by assessment.  - Educate pt/family on patient safety including physical limitations  - Instruct pt to call for assistance with activity based on assessment  - Modify environment to reduce risk of injury  - Provide assistive devices as appropriate  - Consider OT/PT consult to assist with strengthening/mobility  - Encourage toileting schedule  Outcome: Progressing     Problem: DISCHARGE PLANNING  Goal: Discharge to home or other facility with appropriate resources  Description: INTERVENTIONS:  - Identify barriers to discharge w/pt and caregiver  - Include patient/family/discharge partner in discharge planning  - Arrange for needed discharge resources and transportation as appropriate  - Identify discharge learning needs (meds, wound care, etc)  - Arrange for interpreters to assist at discharge as needed  - Consider post-discharge preferences of patient/family/discharge partner  - Complete POLST form as appropriate  - Assess patient's ability to be responsible for managing their own health  - Refer to Case Management Department for coordinating discharge planning if the patient needs post-hospital services based on physician/LIP order or complex needs related to functional status, cognitive ability or social support system  Outcome: Progressing     Problem: Patient/Family Goals  Goal: Patient/Family Long Term Goal  Description: Patient's Long Term Goal: to go home    Interventions:  - Further testing  - Follow MD recommendations  - Medication compliance  - PT/OT  - See additional Care Plan goals for specific interventions  Outcome: Progressing  Goal: Patient/Family Short Term Goal  Description: Patient's Short Term Goal: Find out what is happening to my toes    Interventions:   - Further testing - MRI  - Medication compliance  - Follow MD recommendations  - See additional Care Plan goals for specific interventions  Outcome: Progressing

## 2022-06-15 NOTE — ANESTHESIA POSTPROCEDURE EVALUATION
Patient: Dionicio Call    Procedure Summary     Date: 06/15/22 Room / Location: [address] [place] [address] MAIN OR 11 / [address] [place] [address] MAIN OR    Anesthesia Start: 9206 Anesthesia Stop: 5455    Procedure: right foot amputation 4th and 5th toe (Right Toe) Diagnosis: (right 4th and 5th toes gangrene)    Surgeons: Yady Johnson DPM Anesthesiologist: Nayla Earl MD    Anesthesia Type: MAC ASA Status: 4 - Emergent          Anesthesia Type: MAC    Vitals Value Taken Time   /53 06/15/22 1356   Temp 98.3 06/15/22 1356   Pulse 56 06/15/22 1356   Resp 16 06/15/22 1356   SpO2 94 06/15/22 1356       [address] [place] [address] AN Post Evaluation:   Patient Evaluated in PACU  Patient Participation: complete - patient participated  Level of Consciousness: awake  Pain Score: 0  Pain Management: adequate  Airway Patency:patent  Dental exam unchanged from preop  Yes    Cardiovascular Status: acceptable  Respiratory Status: acceptable  Postoperative Hydration acceptable      Eric Johns CRNA  6/15/2022 1:56 PM

## 2022-06-15 NOTE — PLAN OF CARE
Patient remained NPO prior to the procedure this afternoon. Consents signed for the procedure. Pts hemoglobin was 6.1 this morning. Pt continues to refuse a blood transfusion. Pt educated on risks of the procedure down in preop  by podiatry and anesthesia. Hospitalist aware of pts refusal of blood transfusion. Pt came back from surgery at 1500. Foot covered with dry dressing to be changed by podiatry tomorrow. 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   - Provide timely, complete, and accurate information to patient/family  - Incorporate patient and family knowledge, values, beliefs, and cultural backgrounds into the planning and delivery of care  - Encourage patient/family to participate in care and decision-making at the level they choose  - Honor patient and family perspectives and choices  Outcome: Progressing     Problem: PAIN - ADULT  Goal: Verbalizes/displays adequate comfort level or patient's stated pain goal  Description: INTERVENTIONS:  - Encourage pt to monitor pain and request assistance  - Assess pain using appropriate pain scale  - Administer analgesics based on type and severity of pain and evaluate response  - Implement non-pharmacological measures as appropriate and evaluate response  - Consider cultural and social influences on pain and pain management  - Manage/alleviate anxiety  - Utilize distraction and/or relaxation techniques  - Monitor for opioid side effects  - Notify MD/LIP if interventions unsuccessful or patient reports new pain  - Anticipate increased pain with activity and pre-medicate as appropriate  Outcome: Progressing     Problem: CARDIOVASCULAR - ADULT  Goal: Maintains optimal cardiac output and hemodynamic stability  Description: INTERVENTIONS:  - Monitor vital signs, rhythm, and trends  - Monitor for bleeding, hypotension and signs of decreased cardiac output  - Evaluate effectiveness of vasoactive medications to optimize hemodynamic stability  - Monitor arterial and/or venous puncture sites for bleeding and/or hematoma  - Assess quality of pulses, skin color and temperature  - Assess for signs of decreased coronary artery perfusion - ex. Angina  - Evaluate fluid balance, assess for edema, trend weights  Outcome: Progressing  Goal: Absence of cardiac arrhythmias or at baseline  Description: INTERVENTIONS:  - Continuous cardiac monitoring, monitor vital signs, obtain 12 lead EKG if indicated  - Evaluate effectiveness of antiarrhythmic and heart rate control medications as ordered  - Initiate emergency measures for life threatening arrhythmias  - Monitor electrolytes and administer replacement therapy as ordered  Outcome: Progressing     Problem: RISK FOR INFECTION - ADULT  Goal: Absence of fever/infection during anticipated neutropenic period  Description: INTERVENTIONS  - Monitor WBC  - Administer growth factors as ordered  - Implement neutropenic guidelines  Outcome: Progressing     Problem: SAFETY ADULT - FALL  Goal: Free from fall injury  Description: INTERVENTIONS:  - Assess pt frequently for physical needs  - Identify cognitive and physical deficits and behaviors that affect risk of falls.   - [place] fall precautions as indicated by assessment.  - Educate pt/family on patient safety including physical limitations  - Instruct pt to call for assistance with activity based on assessment  - Modify environment to reduce risk of injury  - Provide assistive devices as appropriate  - Consider OT/PT consult to assist with strengthening/mobility  - Encourage toileting schedule  Outcome: Progressing     Problem: DISCHARGE PLANNING  Goal: Discharge to home or other facility with appropriate resources  Description: INTERVENTIONS:  - Identify barriers to discharge w/pt and caregiver  - Include patient/family/discharge partner in discharge planning  - Arrange for needed discharge resources and transportation as appropriate  - Identify discharge learning needs (meds, wound care, etc)  - Arrange for interpreters to assist at discharge as needed  - Consider post-discharge preferences of patient/family/discharge partner  - Complete POLST form as appropriate  - Assess patient's ability to be responsible for managing their own health  - Refer to Case Management Department for coordinating discharge planning if the patient needs post-hospital services based on physician/LIP order or complex needs related to functional status, cognitive ability or social support system  Outcome: Progressing     Problem: Patient/Family Goals  Goal: Patient/Family Long Term Goal  Description: Patient's Long Term Goal: to go home    Interventions:  - Further testing  - Follow MD recommendations  - Medication compliance  - PT/OT  - See additional Care Plan goals for specific interventions  Outcome: Progressing  Goal: Patient/Family Short Term Goal  Description: Patient's Short Term Goal: Find out what is happening to my toes    Interventions:   - Further testing - MRI  - Medication compliance  - Follow MD recommendations  - See additional Care Plan goals for specific interventions  Outcome: Progressing

## 2022-06-16 PROBLEM — Z71.89 GOALS OF CARE, COUNSELING/DISCUSSION: Status: ACTIVE | Noted: 2022-06-16

## 2022-06-16 PROBLEM — Z71.89 ADVANCED CARE PLANNING/COUNSELING DISCUSSION: Status: ACTIVE | Noted: 2022-06-16

## 2022-06-16 LAB
ALBUMIN SERPL-MCNC: 2.2 G/DL (ref 3.4–5)
ANION GAP SERPL CALC-SCNC: 9 MMOL/L (ref 0–18)
ANTIBODY SCREEN: NEGATIVE
BASOPHILS # BLD AUTO: 0.06 X10(3) UL (ref 0–0.2)
BASOPHILS NFR BLD AUTO: 0.7 %
BUN BLD-MCNC: 27 MG/DL (ref 7–18)
BUN/CREAT SERPL: 13.5 (ref 10–20)
CALCIUM BLD-MCNC: 8.5 MG/DL (ref 8.5–10.1)
CHLORIDE SERPL-SCNC: 113 MMOL/L (ref 98–112)
CO2 SERPL-SCNC: 19 MMOL/L (ref 21–32)
CREAT BLD-MCNC: 2 MG/DL
DEPRECATED RDW RBC AUTO: 50.7 FL (ref 35.1–46.3)
DIRECT COOMBS POLY: NEGATIVE
EOSINOPHIL # BLD AUTO: 0.29 X10(3) UL (ref 0–0.7)
EOSINOPHIL NFR BLD AUTO: 3.3 %
ERYTHROCYTE [DISTWIDTH] IN BLOOD BY AUTOMATED COUNT: 15.5 % (ref 11–15)
GLUCOSE BLD-MCNC: 83 MG/DL (ref 70–99)
HCT VFR BLD AUTO: 19.6 %
HCT VFR BLD AUTO: 23.8 %
HGB BLD-MCNC: 6 G/DL
HGB BLD-MCNC: 7.6 G/DL
IMM GRANULOCYTES # BLD AUTO: 0.1 X10(3) UL (ref 0–1)
IMM GRANULOCYTES NFR BLD: 1.2 %
LYMPHOCYTES # BLD AUTO: 0.73 X10(3) UL (ref 1–4)
LYMPHOCYTES NFR BLD AUTO: 8.4 %
MAGNESIUM SERPL-MCNC: 1.9 MG/DL (ref 1.6–2.6)
MCH RBC QN AUTO: 27.6 PG (ref 26–34)
MCHC RBC AUTO-ENTMCNC: 30.6 G/DL (ref 31–37)
MCV RBC AUTO: 90.3 FL
MONOCYTES # BLD AUTO: 0.81 X10(3) UL (ref 0.1–1)
MONOCYTES NFR BLD AUTO: 9.3 %
NEUTROPHILS # BLD AUTO: 6.69 X10 (3) UL (ref 1.5–7.7)
NEUTROPHILS # BLD AUTO: 6.69 X10(3) UL (ref 1.5–7.7)
NEUTROPHILS NFR BLD AUTO: 77.1 %
OSMOLALITY SERPL CALC.SUM OF ELEC: 296 MOSM/KG (ref 275–295)
PHOSPHATE SERPL-MCNC: 2.9 MG/DL (ref 2.5–4.9)
PLATELET # BLD AUTO: 317 10(3)UL (ref 150–450)
POTASSIUM SERPL-SCNC: 4.2 MMOL/L (ref 3.5–5.1)
RBC # BLD AUTO: 2.17 X10(6)UL
RH BLOOD TYPE: POSITIVE
SODIUM SERPL-SCNC: 141 MMOL/L (ref 136–145)
WBC # BLD AUTO: 8.7 X10(3) UL (ref 4–11)

## 2022-06-16 PROCEDURE — 99232 SBSQ HOSP IP/OBS MODERATE 35: CPT | Performed by: INTERNAL MEDICINE

## 2022-06-16 PROCEDURE — 99497 ADVNCD CARE PLAN 30 MIN: CPT | Performed by: INTERNAL MEDICINE

## 2022-06-16 PROCEDURE — 30233N1 TRANSFUSION OF NONAUTOLOGOUS RED BLOOD CELLS INTO PERIPHERAL VEIN, PERCUTANEOUS APPROACH: ICD-10-PCS | Performed by: INTERNAL MEDICINE

## 2022-06-16 PROCEDURE — 99233 SBSQ HOSP IP/OBS HIGH 50: CPT | Performed by: INTERNAL MEDICINE

## 2022-06-16 RX ORDER — AMOXICILLIN AND CLAVULANATE POTASSIUM 500; 125 MG/1; MG/1
500 TABLET, FILM COATED ORAL EVERY 12 HOURS SCHEDULED
Status: DISCONTINUED | OUTPATIENT
Start: 2022-06-16 | End: 2022-06-18

## 2022-06-16 RX ORDER — SODIUM CHLORIDE 9 MG/ML
INJECTION, SOLUTION INTRAVENOUS ONCE
Status: COMPLETED | OUTPATIENT
Start: 2022-06-16 | End: 2022-06-16

## 2022-06-16 NOTE — CM/SW NOTE
Received MDO for [place] [facility] and sent to [facility] via [facility]. HH instructions added to pt's AVS.    Per RN rounds, pt continues to decline blood transfusions. SW recommended Palliative Care consult be placed. Dr. Alexandr Sigala confirmed he would discuss w/ pt. PLAN: Home w/ [facility] and [facility] [place] - pending clinical course      SW/CM to remain available for support and/or discharge planning.        Tiki Tapia, MSW, [address] [facility]

## 2022-06-16 NOTE — PROGRESS NOTES
Patient is doing well following her angioplasty yesterday. The left groin access site is soft. Her right foot is warm and well-perfused with a strong peroneal signal.  She is scheduled to undergo her for the procedure tomorrow.

## 2022-06-16 NOTE — PLAN OF CARE
No additional complaints overnight. POD #1 s/p fourth and fifth right toe amputation - ace bandage CDI. Wound care to bilateral LE completed. Con't IV venofer, IV cefepime, and IV Vanco. Con't to monitor Hgb. Plan for PT/OT eval in AM.     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   - Provide timely, complete, and accurate information to patient/family  - Incorporate patient and family knowledge, values, beliefs, and cultural backgrounds into the planning and delivery of care  - Encourage patient/family to participate in care and decision-making at the level they choose  - Honor patient and family perspectives and choices  Outcome: Progressing     Problem: PAIN - ADULT  Goal: Verbalizes/displays adequate comfort level or patient's stated pain goal  Description: INTERVENTIONS:  - Encourage pt to monitor pain and request assistance  - Assess pain using appropriate pain scale  - Administer analgesics based on type and severity of pain and evaluate response  - Implement non-pharmacological measures as appropriate and evaluate response  - Consider cultural and social influences on pain and pain management  - Manage/alleviate anxiety  - Utilize distraction and/or relaxation techniques  - Monitor for opioid side effects  - Notify MD/LIP if interventions unsuccessful or patient reports new pain  - Anticipate increased pain with activity and pre-medicate as appropriate  Outcome: Progressing     Problem: CARDIOVASCULAR - ADULT  Goal: Maintains optimal cardiac output and hemodynamic stability  Description: INTERVENTIONS:  - Monitor vital signs, rhythm, and trends  - Monitor for bleeding, hypotension and signs of decreased cardiac output  - Evaluate effectiveness of vasoactive medications to optimize hemodynamic stability  - Monitor arterial and/or venous puncture sites for bleeding and/or hematoma  - Assess quality of pulses, skin color and temperature  - Assess for signs of decreased coronary artery perfusion - ex. Angina  - Evaluate fluid balance, assess for edema, trend weights  Outcome: Progressing  Goal: Absence of cardiac arrhythmias or at baseline  Description: INTERVENTIONS:  - Continuous cardiac monitoring, monitor vital signs, obtain 12 lead EKG if indicated  - Evaluate effectiveness of antiarrhythmic and heart rate control medications as ordered  - Initiate emergency measures for life threatening arrhythmias  - Monitor electrolytes and administer replacement therapy as ordered  Outcome: Progressing     Problem: RISK FOR INFECTION - ADULT  Goal: Absence of fever/infection during anticipated neutropenic period  Description: INTERVENTIONS  - Monitor WBC  - Administer growth factors as ordered  - Implement neutropenic guidelines  Outcome: Progressing     Problem: SAFETY ADULT - FALL  Goal: Free from fall injury  Description: INTERVENTIONS:  - Assess pt frequently for physical needs  - Identify cognitive and physical deficits and behaviors that affect risk of falls.   - [place] fall precautions as indicated by assessment.  - Educate pt/family on patient safety including physical limitations  - Instruct pt to call for assistance with activity based on assessment  - Modify environment to reduce risk of injury  - Provide assistive devices as appropriate  - Consider OT/PT consult to assist with strengthening/mobility  - Encourage toileting schedule  Outcome: Progressing     Problem: DISCHARGE PLANNING  Goal: Discharge to home or other facility with appropriate resources  Description: INTERVENTIONS:  - Identify barriers to discharge w/pt and caregiver  - Include patient/family/discharge partner in discharge planning  - Arrange for needed discharge resources and transportation as appropriate  - Identify discharge learning needs (meds, wound care, etc)  - Arrange for interpreters to assist at discharge as needed  - Consider post-discharge preferences of patient/family/discharge partner  - Complete POLST form as appropriate  - Assess patient's ability to be responsible for managing their own health  - Refer to Case Management Department for coordinating discharge planning if the patient needs post-hospital services based on physician/LIP order or complex needs related to functional status, cognitive ability or social support system  Outcome: Progressing     Problem: Patient/Family Goals  Goal: Patient/Family Long Term Goal  Description: Patient's Long Term Goal: to go home    Interventions:  - Further testing  - Follow MD recommendations  - Medication compliance  - PT/OT  - See additional Care Plan goals for specific interventions  Outcome: Progressing  Goal: Patient/Family Short Term Goal  Description: Patient's Short Term Goal: Find out what is happening to my toes    Interventions:   - Further testing - MRI  - Medication compliance  - Follow MD recommendations  - See additional Care Plan goals for specific interventions  Outcome: Progressing     Problem: HEMATOLOGIC - ADULT  Goal: Maintains hematologic stability  Description: INTERVENTIONS  - Assess for signs and symptoms of bleeding or hemorrhage  - Monitor labs and vital signs for trends  - Administer supportive blood products/factors, fluids and medications as ordered and appropriate  - Administer supportive blood products/factors as ordered and appropriate  Outcome: Not Progressing

## 2022-06-16 NOTE — PLAN OF CARE
Hemoglobin was critical this morning, patient initially refused blood transfusion, but was convinced by MD to receive 1 unit. Patient states that she is 80 and does not really want to go through a whole list of treatments. Palliative care was added to consult. 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   - Provide timely, complete, and accurate information to patient/family  - Incorporate patient and family knowledge, values, beliefs, and cultural backgrounds into the planning and delivery of care  - Encourage patient/family to participate in care and decision-making at the level they choose  - Honor patient and family perspectives and choices  Outcome: Progressing     Problem: PAIN - ADULT  Goal: Verbalizes/displays adequate comfort level or patient's stated pain goal  Description: INTERVENTIONS:  - Encourage pt to monitor pain and request assistance  - Assess pain using appropriate pain scale  - Administer analgesics based on type and severity of pain and evaluate response  - Implement non-pharmacological measures as appropriate and evaluate response  - Consider cultural and social influences on pain and pain management  - Manage/alleviate anxiety  - Utilize distraction and/or relaxation techniques  - Monitor for opioid side effects  - Notify MD/LIP if interventions unsuccessful or patient reports new pain  - Anticipate increased pain with activity and pre-medicate as appropriate  Outcome: Progressing     Problem: CARDIOVASCULAR - ADULT  Goal: Maintains optimal cardiac output and hemodynamic stability  Description: INTERVENTIONS:  - Monitor vital signs, rhythm, and trends  - Monitor for bleeding, hypotension and signs of decreased cardiac output  - Evaluate effectiveness of vasoactive medications to optimize hemodynamic stability  - Monitor arterial and/or venous puncture sites for bleeding and/or hematoma  - Assess quality of pulses, skin color and temperature  - Assess for signs of decreased coronary artery perfusion - ex. Angina  - Evaluate fluid balance, assess for edema, trend weights  Outcome: Progressing  Goal: Absence of cardiac arrhythmias or at baseline  Description: INTERVENTIONS:  - Continuous cardiac monitoring, monitor vital signs, obtain 12 lead EKG if indicated  - Evaluate effectiveness of antiarrhythmic and heart rate control medications as ordered  - Initiate emergency measures for life threatening arrhythmias  - Monitor electrolytes and administer replacement therapy as ordered  Outcome: Progressing     Problem: RISK FOR INFECTION - ADULT  Goal: Absence of fever/infection during anticipated neutropenic period  Description: INTERVENTIONS  - Monitor WBC  - Administer growth factors as ordered  - Implement neutropenic guidelines  Outcome: Progressing     Problem: SAFETY ADULT - FALL  Goal: Free from fall injury  Description: INTERVENTIONS:  - Assess pt frequently for physical needs  - Identify cognitive and physical deficits and behaviors that affect risk of falls.   - [place] fall precautions as indicated by assessment.  - Educate pt/family on patient safety including physical limitations  - Instruct pt to call for assistance with activity based on assessment  - Modify environment to reduce risk of injury  - Provide assistive devices as appropriate  - Consider OT/PT consult to assist with strengthening/mobility  - Encourage toileting schedule  Outcome: Progressing     Problem: DISCHARGE PLANNING  Goal: Discharge to home or other facility with appropriate resources  Description: INTERVENTIONS:  - Identify barriers to discharge w/pt and caregiver  - Include patient/family/discharge partner in discharge planning  - Arrange for needed discharge resources and transportation as appropriate  - Identify discharge learning needs (meds, wound care, etc)  - Arrange for interpreters to assist at discharge as needed  - Consider post-discharge preferences of patient/family/discharge partner  - Complete POLST form as appropriate  - Assess patient's ability to be responsible for managing their own health  - Refer to Case Management Department for coordinating discharge planning if the patient needs post-hospital services based on physician/LIP order or complex needs related to functional status, cognitive ability or social support system  Outcome: Progressing     Problem: Patient/Family Goals  Goal: Patient/Family Long Term Goal  Description: Patient's Long Term Goal: to go home    Interventions:  - Further testing  - Follow MD recommendations  - Medication compliance  - PT/OT  - See additional Care Plan goals for specific interventions  Outcome: Progressing  Goal: Patient/Family Short Term Goal  Description: Patient's Short Term Goal: Find out what is happening to my toes    Interventions:   - Further testing - MRI  - Medication compliance  - Follow MD recommendations  - See additional Care Plan goals for specific interventions  Outcome: Progressing     Problem: HEMATOLOGIC - ADULT  Goal: Maintains hematologic stability  Description: INTERVENTIONS  - Assess for signs and symptoms of bleeding or hemorrhage  - Monitor labs and vital signs for trends  - Administer supportive blood products/factors, fluids and medications as ordered and appropriate  - Administer supportive blood products/factors as ordered and appropriate  Outcome: Progressing

## 2022-06-16 NOTE — PROGRESS NOTES
Patient's hemoglobin 6.0 this AM. Pt continues to refuse blood transfusion. Dr. Elyse Gutiérrez notified. Awaiting response. Update: pt. Agreed to 1 unit.

## 2022-06-17 LAB
ALBUMIN SERPL-MCNC: 2.3 G/DL (ref 3.4–5)
ANION GAP SERPL CALC-SCNC: 7 MMOL/L (ref 0–18)
BASOPHILS # BLD AUTO: 0.05 X10(3) UL (ref 0–0.2)
BASOPHILS NFR BLD AUTO: 0.5 %
BUN BLD-MCNC: 26 MG/DL (ref 7–18)
BUN/CREAT SERPL: 12.3 (ref 10–20)
CALCIUM BLD-MCNC: 8.6 MG/DL (ref 8.5–10.1)
CHLORIDE SERPL-SCNC: 111 MMOL/L (ref 98–112)
CO2 SERPL-SCNC: 21 MMOL/L (ref 21–32)
CREAT BLD-MCNC: 2.11 MG/DL
DEPRECATED RDW RBC AUTO: 49 FL (ref 35.1–46.3)
EOSINOPHIL # BLD AUTO: 0.26 X10(3) UL (ref 0–0.7)
EOSINOPHIL NFR BLD AUTO: 2.7 %
ERYTHROCYTE [DISTWIDTH] IN BLOOD BY AUTOMATED COUNT: 15.2 % (ref 11–15)
GLUCOSE BLD-MCNC: 77 MG/DL (ref 70–99)
HCT VFR BLD AUTO: 24.5 %
HGB BLD-MCNC: 7.8 G/DL
IMM GRANULOCYTES # BLD AUTO: 0.14 X10(3) UL (ref 0–1)
IMM GRANULOCYTES NFR BLD: 1.4 %
LYMPHOCYTES # BLD AUTO: 0.76 X10(3) UL (ref 1–4)
LYMPHOCYTES NFR BLD AUTO: 7.8 %
MAGNESIUM SERPL-MCNC: 1.9 MG/DL (ref 1.6–2.6)
MCH RBC QN AUTO: 29 PG (ref 26–34)
MCHC RBC AUTO-ENTMCNC: 31.8 G/DL (ref 31–37)
MCV RBC AUTO: 91.1 FL
MONOCYTES # BLD AUTO: 1 X10(3) UL (ref 0.1–1)
MONOCYTES NFR BLD AUTO: 10.3 %
NEUTROPHILS # BLD AUTO: 7.51 X10 (3) UL (ref 1.5–7.7)
NEUTROPHILS # BLD AUTO: 7.51 X10(3) UL (ref 1.5–7.7)
NEUTROPHILS NFR BLD AUTO: 77.3 %
OSMOLALITY SERPL CALC.SUM OF ELEC: 292 MOSM/KG (ref 275–295)
PHOSPHATE SERPL-MCNC: 2.7 MG/DL (ref 2.5–4.9)
PLATELET # BLD AUTO: 306 10(3)UL (ref 150–450)
POTASSIUM SERPL-SCNC: 4.1 MMOL/L (ref 3.5–5.1)
RBC # BLD AUTO: 2.69 X10(6)UL
SODIUM SERPL-SCNC: 139 MMOL/L (ref 136–145)
WBC # BLD AUTO: 9.7 X10(3) UL (ref 4–11)

## 2022-06-17 PROCEDURE — 99233 SBSQ HOSP IP/OBS HIGH 50: CPT | Performed by: INTERNAL MEDICINE

## 2022-06-17 RX ORDER — HYDRALAZINE HYDROCHLORIDE 25 MG/1
25 TABLET, FILM COATED ORAL 2 TIMES DAILY
Status: DISCONTINUED | OUTPATIENT
Start: 2022-06-17 | End: 2022-06-18

## 2022-06-17 RX ORDER — HYDRALAZINE HYDROCHLORIDE 25 MG/1
12.5 TABLET, FILM COATED ORAL 2 TIMES DAILY
Status: DISCONTINUED | OUTPATIENT
Start: 2022-06-17 | End: 2022-06-17

## 2022-06-17 NOTE — PROGRESS NOTES
Bladder scan greater than 999 mLs. Attempted to straight cath, pt. refusing and able to urinate. Will endorsee to night shift.

## 2022-06-17 NOTE — CM/SW NOTE
Orders signed for hospital bed, [facility] to deliver on 6/18. Dr. Yashira Maza, her [address] PCP is palliative certified. [facility] notified of new orders and will manage. Clinical updates sent to [facility]. CM to notify of them of resumption start date. Discussed need for 24 hour CG and patient is agreeable to plan- has a current CG in place. PLAN:  Discharge home when medically rady with home health services ([facility]), [address], [address] [facility] bed, [address] (Dr. Ryan Gibson) to manage. / to remain available for support and/or discharge planning.      Leah HADDADN RN [address]  RN Case Manager  [phone]

## 2022-06-17 NOTE — PLAN OF CARE
One unit PRBC transfused overnight. No reaction noted. P.o abx continues. Purewick in place- small urine output. Bladder scan done 433 . Patient refused guerra catheter. DR. Richard Andrade paged- awaiting call back. 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   - Provide timely, complete, and accurate information to patient/family  - Incorporate patient and family knowledge, values, beliefs, and cultural backgrounds into the planning and delivery of care  - Encourage patient/family to participate in care and decision-making at the level they choose  - Honor patient and family perspectives and choices  Outcome: Progressing     Problem: PAIN - ADULT  Goal: Verbalizes/displays adequate comfort level or patient's stated pain goal  Description: INTERVENTIONS:  - Encourage pt to monitor pain and request assistance  - Assess pain using appropriate pain scale  - Administer analgesics based on type and severity of pain and evaluate response  - Implement non-pharmacological measures as appropriate and evaluate response  - Consider cultural and social influences on pain and pain management  - Manage/alleviate anxiety  - Utilize distraction and/or relaxation techniques  - Monitor for opioid side effects  - Notify MD/LIP if interventions unsuccessful or patient reports new pain  - Anticipate increased pain with activity and pre-medicate as appropriate  Outcome: Progressing     Problem: CARDIOVASCULAR - ADULT  Goal: Maintains optimal cardiac output and hemodynamic stability  Description: INTERVENTIONS:  - Monitor vital signs, rhythm, and trends  - Monitor for bleeding, hypotension and signs of decreased cardiac output  - Evaluate effectiveness of vasoactive medications to optimize hemodynamic stability  - Monitor arterial and/or venous puncture sites for bleeding and/or hematoma  - Assess quality of pulses, skin color and temperature  - Assess for signs of decreased coronary artery perfusion - ex. Angina  - Evaluate fluid balance, assess for edema, trend weights  Outcome: Progressing  Goal: Absence of cardiac arrhythmias or at baseline  Description: INTERVENTIONS:  - Continuous cardiac monitoring, monitor vital signs, obtain 12 lead EKG if indicated  - Evaluate effectiveness of antiarrhythmic and heart rate control medications as ordered  - Initiate emergency measures for life threatening arrhythmias  - Monitor electrolytes and administer replacement therapy as ordered  Outcome: Progressing     Problem: RISK FOR INFECTION - ADULT  Goal: Absence of fever/infection during anticipated neutropenic period  Description: INTERVENTIONS  - Monitor WBC  - Administer growth factors as ordered  - Implement neutropenic guidelines  Outcome: Progressing     Problem: SAFETY ADULT - FALL  Goal: Free from fall injury  Description: INTERVENTIONS:  - Assess pt frequently for physical needs  - Identify cognitive and physical deficits and behaviors that affect risk of falls.   - [place] fall precautions as indicated by assessment.  - Educate pt/family on patient safety including physical limitations  - Instruct pt to call for assistance with activity based on assessment  - Modify environment to reduce risk of injury  - Provide assistive devices as appropriate  - Consider OT/PT consult to assist with strengthening/mobility  - Encourage toileting schedule  Outcome: Progressing     Problem: DISCHARGE PLANNING  Goal: Discharge to home or other facility with appropriate resources  Description: INTERVENTIONS:  - Identify barriers to discharge w/pt and caregiver  - Include patient/family/discharge partner in discharge planning  - Arrange for needed discharge resources and transportation as appropriate  - Identify discharge learning needs (meds, wound care, etc)  - Arrange for interpreters to assist at discharge as needed  - Consider post-discharge preferences of patient/family/discharge partner  - Complete POLST form as appropriate  - Assess patient's ability to be responsible for managing their own health  - Refer to Case Management Department for coordinating discharge planning if the patient needs post-hospital services based on physician/LIP order or complex needs related to functional status, cognitive ability or social support system  Outcome: Progressing     Problem: Patient/Family Goals  Goal: Patient/Family Long Term Goal  Description: Patient's Long Term Goal: to go home    Interventions:  - Further testing  - Follow MD recommendations  - Medication compliance  - PT/OT  - See additional Care Plan goals for specific interventions  Outcome: Progressing  Goal: Patient/Family Short Term Goal  Description: Patient's Short Term Goal: Find out what is happening to my toes    Interventions:   - Further testing - MRI  - Medication compliance  - Follow MD recommendations  - See additional Care Plan goals for specific interventions  Outcome: Progressing     Problem: HEMATOLOGIC - ADULT  Goal: Maintains hematologic stability  Description: INTERVENTIONS  - Assess for signs and symptoms of bleeding or hemorrhage  - Monitor labs and vital signs for trends  - Administer supportive blood products/factors, fluids and medications as ordered and appropriate  - Administer supportive blood products/factors as ordered and appropriate  Outcome: Progressing

## 2022-06-17 NOTE — PLAN OF CARE
Patient AO x 4. BP elevated, patient agreed to medical management. Patient was retaining urine, eventually able to urinate on toilet. Plan to discharge home with Antoni Duffy in AM.    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   - Provide timely, complete, and accurate information to patient/family  - Incorporate patient and family knowledge, values, beliefs, and cultural backgrounds into the planning and delivery of care  - Encourage patient/family to participate in care and decision-making at the level they choose  - Honor patient and family perspectives and choices  Outcome: Progressing     Problem: PAIN - ADULT  Goal: Verbalizes/displays adequate comfort level or patient's stated pain goal  Description: INTERVENTIONS:  - Encourage pt to monitor pain and request assistance  - Assess pain using appropriate pain scale  - Administer analgesics based on type and severity of pain and evaluate response  - Implement non-pharmacological measures as appropriate and evaluate response  - Consider cultural and social influences on pain and pain management  - Manage/alleviate anxiety  - Utilize distraction and/or relaxation techniques  - Monitor for opioid side effects  - Notify MD/LIP if interventions unsuccessful or patient reports new pain  - Anticipate increased pain with activity and pre-medicate as appropriate  Outcome: Progressing     Problem: CARDIOVASCULAR - ADULT  Goal: Maintains optimal cardiac output and hemodynamic stability  Description: INTERVENTIONS:  - Monitor vital signs, rhythm, and trends  - Monitor for bleeding, hypotension and signs of decreased cardiac output  - Evaluate effectiveness of vasoactive medications to optimize hemodynamic stability  - Monitor arterial and/or venous puncture sites for bleeding and/or hematoma  - Assess quality of pulses, skin color and temperature  - Assess for signs of decreased coronary artery perfusion - ex. Angina  - Evaluate fluid balance, assess for edema, trend weights  Outcome: Progressing  Goal: Absence of cardiac arrhythmias or at baseline  Description: INTERVENTIONS:  - Continuous cardiac monitoring, monitor vital signs, obtain 12 lead EKG if indicated  - Evaluate effectiveness of antiarrhythmic and heart rate control medications as ordered  - Initiate emergency measures for life threatening arrhythmias  - Monitor electrolytes and administer replacement therapy as ordered  Outcome: Progressing     Problem: RISK FOR INFECTION - ADULT  Goal: Absence of fever/infection during anticipated neutropenic period  Description: INTERVENTIONS  - Monitor WBC  - Administer growth factors as ordered  - Implement neutropenic guidelines  Outcome: Progressing     Problem: SAFETY ADULT - FALL  Goal: Free from fall injury  Description: INTERVENTIONS:  - Assess pt frequently for physical needs  - Identify cognitive and physical deficits and behaviors that affect risk of falls.   - [place] fall precautions as indicated by assessment.  - Educate pt/family on patient safety including physical limitations  - Instruct pt to call for assistance with activity based on assessment  - Modify environment to reduce risk of injury  - Provide assistive devices as appropriate  - Consider OT/PT consult to assist with strengthening/mobility  - Encourage toileting schedule  Outcome: Progressing     Problem: DISCHARGE PLANNING  Goal: Discharge to home or other facility with appropriate resources  Description: INTERVENTIONS:  - Identify barriers to discharge w/pt and caregiver  - Include patient/family/discharge partner in discharge planning  - Arrange for needed discharge resources and transportation as appropriate  - Identify discharge learning needs (meds, wound care, etc)  - Arrange for interpreters to assist at discharge as needed  - Consider post-discharge preferences of patient/family/discharge partner  - Complete POLST form as appropriate  - Assess patient's ability to be responsible for managing their own health  - Refer to Case Management Department for coordinating discharge planning if the patient needs post-hospital services based on physician/LIP order or complex needs related to functional status, cognitive ability or social support system  Outcome: Progressing     Problem: Patient/Family Goals  Goal: Patient/Family Long Term Goal  Description: Patient's Long Term Goal: to go home    Interventions:  - Further testing  - Follow MD recommendations  - Medication compliance  - PT/OT  - See additional Care Plan goals for specific interventions  Outcome: Progressing  Goal: Patient/Family Short Term Goal  Description: Patient's Short Term Goal: Find out what is happening to my toes    Interventions:   - Further testing - MRI  - Medication compliance  - Follow MD recommendations  - See additional Care Plan goals for specific interventions  Outcome: Progressing     Problem: HEMATOLOGIC - ADULT  Goal: Maintains hematologic stability  Description: INTERVENTIONS  - Assess for signs and symptoms of bleeding or hemorrhage  - Monitor labs and vital signs for trends  - Administer supportive blood products/factors, fluids and medications as ordered and appropriate  - Administer supportive blood products/factors as ordered and appropriate  Outcome: Progressing

## 2022-06-17 NOTE — CM/SW NOTE
Patient to discharge today. Will benefit from a semi-electric bed to help facilitate safe transfers due to recent toe amputations. Order placed. Will work with vendor to facilitate delivery today. Cyndy Huang MBA BSN RN CRRN [facility]  RN Case Manager  [phone]    On 6/17/22 I had a face to face encounter with Kevin Courtney for a semi electric hospital bed medical necessity evaluation. Patient has a history of (dx), which would require him/her to have to elevate his/her head 30 degrees or more in order to avoid (symptoms). Patient is bed bound and is not able to reposition him/her self and needs the bed to alleviate pain in his/her (body part). It is in my opinion that a semi-electric hospital bed is reasonable and necessary.

## 2022-06-18 VITALS
RESPIRATION RATE: 18 BRPM | BODY MASS INDEX: 27.36 KG/M2 | SYSTOLIC BLOOD PRESSURE: 152 MMHG | WEIGHT: 148.69 LBS | OXYGEN SATURATION: 97 % | HEIGHT: 62 IN | TEMPERATURE: 98 F | HEART RATE: 61 BPM | DIASTOLIC BLOOD PRESSURE: 59 MMHG

## 2022-06-18 LAB — BLOOD TYPE BARCODE: 6200

## 2022-06-18 PROCEDURE — 99239 HOSP IP/OBS DSCHRG MGMT >30: CPT | Performed by: INTERNAL MEDICINE

## 2022-06-18 PROCEDURE — 99232 SBSQ HOSP IP/OBS MODERATE 35: CPT | Performed by: INTERNAL MEDICINE

## 2022-06-18 RX ORDER — AMLODIPINE BESYLATE 10 MG/1
10 TABLET ORAL DAILY
Qty: 30 TABLET | Refills: 0 | Status: SHIPPED | OUTPATIENT
Start: 2022-06-19 | End: 2022-07-19

## 2022-06-18 RX ORDER — CLOPIDOGREL BISULFATE 75 MG/1
75 TABLET ORAL DAILY
Qty: 30 TABLET | Refills: 0 | Status: SHIPPED | OUTPATIENT
Start: 2022-06-19 | End: 2022-07-19

## 2022-06-18 RX ORDER — HYDROCODONE BITARTRATE AND ACETAMINOPHEN 5; 325 MG/1; MG/1
1 TABLET ORAL EVERY 6 HOURS PRN
Qty: 15 TABLET | Refills: 0 | Status: SHIPPED | OUTPATIENT
Start: 2022-06-18

## 2022-06-18 RX ORDER — HYDRALAZINE HYDROCHLORIDE 25 MG/1
25 TABLET, FILM COATED ORAL EVERY 8 HOURS SCHEDULED
Qty: 90 TABLET | Refills: 0 | Status: SHIPPED | OUTPATIENT
Start: 2022-06-18 | End: 2022-07-18

## 2022-06-18 RX ORDER — HYDRALAZINE HYDROCHLORIDE 50 MG/1
50 TABLET, FILM COATED ORAL EVERY 8 HOURS SCHEDULED
Status: DISCONTINUED | OUTPATIENT
Start: 2022-06-18 | End: 2022-06-18

## 2022-06-18 RX ORDER — HYDRALAZINE HYDROCHLORIDE 25 MG/1
25 TABLET, FILM COATED ORAL ONCE
Status: DISCONTINUED | OUTPATIENT
Start: 2022-06-18 | End: 2022-06-18

## 2022-06-18 RX ORDER — AMOXICILLIN AND CLAVULANATE POTASSIUM 500; 125 MG/1; MG/1
500 TABLET, FILM COATED ORAL EVERY 12 HOURS SCHEDULED
Qty: 6 TABLET | Refills: 0 | Status: SHIPPED | OUTPATIENT
Start: 2022-06-18 | End: 2022-06-21

## 2022-06-18 RX ORDER — HYDRALAZINE HYDROCHLORIDE 25 MG/1
25 TABLET, FILM COATED ORAL EVERY 8 HOURS SCHEDULED
Status: DISCONTINUED | OUTPATIENT
Start: 2022-06-18 | End: 2022-06-18

## 2022-06-18 NOTE — CM/SW NOTE
06/18/22 1435   Discharge disposition   Expected discharge disposition [address] Provider   (Aries Kurtz)   [facility]  [facility] bed)   Discharge transportation [address]     Pt discussed during nursing rounds. Pt is stable for [place] today. [facility] Villalobos  [facility] at [place], agency notified of patient's dc home today. HME will provide hospital bed which was scheduled to be delivered today. Pt request assist w/transport home. [address] scheduled for 5pm . Plan: Home w/[facility] and [facility] for hospital bed today. / to remain available for support and/or discharge planning.      Tu Leonard, BSN    [phone]

## 2022-06-18 NOTE — PLAN OF CARE
Patient with no over night complaints. She is s/p amputation of 4th and 5th toes. She is able to stand and pivot with 1 person assistance; she is partial weight bearing to right foot. Plan for discharge home with Antoni Duffy possibly today. 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   - Provide timely, complete, and accurate information to patient/family  - Incorporate patient and family knowledge, values, beliefs, and cultural backgrounds into the planning and delivery of care  - Encourage patient/family to participate in care and decision-making at the level they choose  - Honor patient and family perspectives and choices  Outcome: Progressing     Problem: PAIN - ADULT  Goal: Verbalizes/displays adequate comfort level or patient's stated pain goal  Description: INTERVENTIONS:  - Encourage pt to monitor pain and request assistance  - Assess pain using appropriate pain scale  - Administer analgesics based on type and severity of pain and evaluate response  - Implement non-pharmacological measures as appropriate and evaluate response  - Consider cultural and social influences on pain and pain management  - Manage/alleviate anxiety  - Utilize distraction and/or relaxation techniques  - Monitor for opioid side effects  - Notify MD/LIP if interventions unsuccessful or patient reports new pain  - Anticipate increased pain with activity and pre-medicate as appropriate  Outcome: Progressing     Problem: CARDIOVASCULAR - ADULT  Goal: Maintains optimal cardiac output and hemodynamic stability  Description: INTERVENTIONS:  - Monitor vital signs, rhythm, and trends  - Monitor for bleeding, hypotension and signs of decreased cardiac output  - Evaluate effectiveness of vasoactive medications to optimize hemodynamic stability  - Monitor arterial and/or venous puncture sites for bleeding and/or hematoma  - Assess quality of pulses, skin color and temperature  - Assess for signs of decreased coronary artery perfusion - ex. Angina  - Evaluate fluid balance, assess for edema, trend weights  Outcome: Progressing  Goal: Absence of cardiac arrhythmias or at baseline  Description: INTERVENTIONS:  - Continuous cardiac monitoring, monitor vital signs, obtain 12 lead EKG if indicated  - Evaluate effectiveness of antiarrhythmic and heart rate control medications as ordered  - Initiate emergency measures for life threatening arrhythmias  - Monitor electrolytes and administer replacement therapy as ordered  Outcome: Progressing     Problem: RISK FOR INFECTION - ADULT  Goal: Absence of fever/infection during anticipated neutropenic period  Description: INTERVENTIONS  - Monitor WBC  - Administer growth factors as ordered  - Implement neutropenic guidelines  Outcome: Progressing     Problem: SAFETY ADULT - FALL  Goal: Free from fall injury  Description: INTERVENTIONS:  - Assess pt frequently for physical needs  - Identify cognitive and physical deficits and behaviors that affect risk of falls.   - [place] fall precautions as indicated by assessment.  - Educate pt/family on patient safety including physical limitations  - Instruct pt to call for assistance with activity based on assessment  - Modify environment to reduce risk of injury  - Provide assistive devices as appropriate  - Consider OT/PT consult to assist with strengthening/mobility  - Encourage toileting schedule  Outcome: Progressing     Problem: DISCHARGE PLANNING  Goal: Discharge to home or other facility with appropriate resources  Description: INTERVENTIONS:  - Identify barriers to discharge w/pt and caregiver  - Include patient/family/discharge partner in discharge planning  - Arrange for needed discharge resources and transportation as appropriate  - Identify discharge learning needs (meds, wound care, etc)  - Arrange for interpreters to assist at discharge as needed  - Consider post-discharge preferences of patient/family/discharge partner  - Complete POLST form as appropriate  - Assess patient's ability to be responsible for managing their own health  - Refer to Case Management Department for coordinating discharge planning if the patient needs post-hospital services based on physician/LIP order or complex needs related to functional status, cognitive ability or social support system  Outcome: Progressing     Problem: HEMATOLOGIC - ADULT  Goal: Maintains hematologic stability  Description: INTERVENTIONS  - Assess for signs and symptoms of bleeding or hemorrhage  - Monitor labs and vital signs for trends  - Administer supportive blood products/factors, fluids and medications as ordered and appropriate  - Administer supportive blood products/factors as ordered and appropriate  Outcome: Progressing

## 2022-09-12 ENCOUNTER — HOSPITAL ENCOUNTER (INPATIENT)
Facility: HOSPITAL | Age: 87
LOS: 4 days | Discharge: HOME HEALTH | End: 2022-09-16
Attending: EMERGENCY MEDICINE | Admitting: HOSPITALIST
Payer: MEDICARE

## 2022-09-12 ENCOUNTER — APPOINTMENT (OUTPATIENT)
Dept: ULTRASOUND IMAGING | Facility: HOSPITAL | Age: 87
End: 2022-09-12
Attending: HOSPITALIST
Payer: MEDICARE

## 2022-09-12 ENCOUNTER — APPOINTMENT (OUTPATIENT)
Dept: CT IMAGING | Facility: HOSPITAL | Age: 87
End: 2022-09-12
Attending: EMERGENCY MEDICINE
Payer: MEDICARE

## 2022-09-12 ENCOUNTER — APPOINTMENT (OUTPATIENT)
Dept: GENERAL RADIOLOGY | Facility: HOSPITAL | Age: 87
End: 2022-09-12
Attending: HOSPITALIST
Payer: MEDICARE

## 2022-09-12 DIAGNOSIS — R26.2 INABILITY TO AMBULATE DUE TO MULTIPLE JOINTS: ICD-10-CM

## 2022-09-12 DIAGNOSIS — R53.1 WEAKNESS GENERALIZED: ICD-10-CM

## 2022-09-12 DIAGNOSIS — K64.4 EXTERNAL HEMORRHOID: ICD-10-CM

## 2022-09-12 DIAGNOSIS — L89.152 PRESSURE INJURY OF SACRAL REGION, STAGE 2 (HCC): Primary | ICD-10-CM

## 2022-09-12 DIAGNOSIS — R33.9 URINARY RETENTION: ICD-10-CM

## 2022-09-12 LAB
ANION GAP SERPL CALC-SCNC: 8 MMOL/L (ref 0–18)
BASOPHILS # BLD AUTO: 0.03 X10(3) UL (ref 0–0.2)
BASOPHILS NFR BLD AUTO: 0.7 %
BILIRUB UR QL: NEGATIVE
BUN BLD-MCNC: 35 MG/DL (ref 7–18)
BUN/CREAT SERPL: 17.4 (ref 10–20)
CALCIUM BLD-MCNC: 9.2 MG/DL (ref 8.5–10.1)
CHLORIDE SERPL-SCNC: 109 MMOL/L (ref 98–112)
CO2 SERPL-SCNC: 23 MMOL/L (ref 21–32)
COLOR UR: YELLOW
CREAT BLD-MCNC: 2.01 MG/DL
DEPRECATED RDW RBC AUTO: 52.2 FL (ref 35.1–46.3)
EOSINOPHIL # BLD AUTO: 0.15 X10(3) UL (ref 0–0.7)
EOSINOPHIL NFR BLD AUTO: 3.5 %
ERYTHROCYTE [DISTWIDTH] IN BLOOD BY AUTOMATED COUNT: 15.7 % (ref 11–15)
ERYTHROCYTE [SEDIMENTATION RATE] IN BLOOD: 69 MM/HR
GFR SERPLBLD BASED ON 1.73 SQ M-ARVRAT: 22 ML/MIN/1.73M2 (ref 60–?)
GLUCOSE BLD-MCNC: 78 MG/DL (ref 70–99)
GLUCOSE UR-MCNC: NEGATIVE MG/DL
HCT VFR BLD AUTO: 29.8 %
HGB BLD-MCNC: 9.1 G/DL
IMM GRANULOCYTES # BLD AUTO: 0.01 X10(3) UL (ref 0–1)
IMM GRANULOCYTES NFR BLD: 0.2 %
KETONES UR-MCNC: NEGATIVE MG/DL
LYMPHOCYTES # BLD AUTO: 0.57 X10(3) UL (ref 1–4)
LYMPHOCYTES NFR BLD AUTO: 13.4 %
MCH RBC QN AUTO: 27.8 PG (ref 26–34)
MCHC RBC AUTO-ENTMCNC: 30.5 G/DL (ref 31–37)
MCV RBC AUTO: 91.1 FL
MONOCYTES # BLD AUTO: 0.46 X10(3) UL (ref 0.1–1)
MONOCYTES NFR BLD AUTO: 10.8 %
NEUTROPHILS # BLD AUTO: 3.04 X10 (3) UL (ref 1.5–7.7)
NEUTROPHILS # BLD AUTO: 3.04 X10(3) UL (ref 1.5–7.7)
NEUTROPHILS NFR BLD AUTO: 71.4 %
NITRITE UR QL STRIP.AUTO: NEGATIVE
OSMOLALITY SERPL CALC.SUM OF ELEC: 297 MOSM/KG (ref 275–295)
PH UR: 7 [PH] (ref 5–8)
PLATELET # BLD AUTO: 364 10(3)UL (ref 150–450)
POTASSIUM SERPL-SCNC: 4.5 MMOL/L (ref 3.5–5.1)
RBC # BLD AUTO: 3.27 X10(6)UL
SARS-COV-2 RNA RESP QL NAA+PROBE: NOT DETECTED
SODIUM SERPL-SCNC: 140 MMOL/L (ref 136–145)
SP GR UR STRIP: 1.01 (ref 1–1.03)
UROBILINOGEN UR STRIP-ACNC: 0.2
WBC # BLD AUTO: 4.3 X10(3) UL (ref 4–11)
WBC #/AREA URNS AUTO: >50 /HPF
WBC CLUMPS UR QL AUTO: PRESENT /HPF

## 2022-09-12 PROCEDURE — 99223 1ST HOSP IP/OBS HIGH 75: CPT | Performed by: HOSPITALIST

## 2022-09-12 PROCEDURE — 72131 CT LUMBAR SPINE W/O DYE: CPT | Performed by: EMERGENCY MEDICINE

## 2022-09-12 PROCEDURE — 73650 X-RAY EXAM OF HEEL: CPT | Performed by: HOSPITALIST

## 2022-09-12 PROCEDURE — 76770 US EXAM ABDO BACK WALL COMP: CPT | Performed by: HOSPITALIST

## 2022-09-12 RX ORDER — ACETAMINOPHEN 500 MG
500 TABLET ORAL EVERY 4 HOURS PRN
Status: DISCONTINUED | OUTPATIENT
Start: 2022-09-12 | End: 2022-09-16

## 2022-09-12 RX ORDER — HYDROMORPHONE HYDROCHLORIDE 1 MG/ML
0.4 INJECTION, SOLUTION INTRAMUSCULAR; INTRAVENOUS; SUBCUTANEOUS EVERY 2 HOUR PRN
Status: DISCONTINUED | OUTPATIENT
Start: 2022-09-12 | End: 2022-09-16

## 2022-09-12 RX ORDER — HYDROCODONE BITARTRATE AND ACETAMINOPHEN 5; 325 MG/1; MG/1
1 TABLET ORAL EVERY 4 HOURS PRN
Status: DISCONTINUED | OUTPATIENT
Start: 2022-09-12 | End: 2022-09-16

## 2022-09-12 RX ORDER — ACETAMINOPHEN 325 MG/1
650 TABLET ORAL EVERY 4 HOURS PRN
Status: DISCONTINUED | OUTPATIENT
Start: 2022-09-12 | End: 2022-09-16

## 2022-09-12 RX ORDER — ATENOLOL 25 MG/1
25 TABLET ORAL DAILY
Status: DISCONTINUED | OUTPATIENT
Start: 2022-09-12 | End: 2022-09-16

## 2022-09-12 RX ORDER — HYDROMORPHONE HYDROCHLORIDE 1 MG/ML
0.2 INJECTION, SOLUTION INTRAMUSCULAR; INTRAVENOUS; SUBCUTANEOUS EVERY 2 HOUR PRN
Status: DISCONTINUED | OUTPATIENT
Start: 2022-09-12 | End: 2022-09-16

## 2022-09-12 RX ORDER — TRAMADOL HYDROCHLORIDE 50 MG/1
50 TABLET ORAL ONCE
Status: DISCONTINUED | OUTPATIENT
Start: 2022-09-12 | End: 2022-09-16

## 2022-09-12 RX ORDER — ONDANSETRON 2 MG/ML
4 INJECTION INTRAMUSCULAR; INTRAVENOUS EVERY 6 HOURS PRN
Status: DISCONTINUED | OUTPATIENT
Start: 2022-09-12 | End: 2022-09-16

## 2022-09-12 RX ORDER — METOCLOPRAMIDE HYDROCHLORIDE 5 MG/ML
5 INJECTION INTRAMUSCULAR; INTRAVENOUS EVERY 8 HOURS PRN
Status: DISCONTINUED | OUTPATIENT
Start: 2022-09-12 | End: 2022-09-16

## 2022-09-12 RX ORDER — HEPARIN SODIUM 5000 [USP'U]/ML
5000 INJECTION, SOLUTION INTRAVENOUS; SUBCUTANEOUS EVERY 12 HOURS SCHEDULED
Status: DISCONTINUED | OUTPATIENT
Start: 2022-09-12 | End: 2022-09-16

## 2022-09-12 RX ORDER — AMLODIPINE BESYLATE 10 MG/1
10 TABLET ORAL DAILY
Status: DISCONTINUED | OUTPATIENT
Start: 2022-09-12 | End: 2022-09-16

## 2022-09-12 RX ORDER — HYDROCODONE BITARTRATE AND ACETAMINOPHEN 5; 325 MG/1; MG/1
2 TABLET ORAL EVERY 4 HOURS PRN
Status: DISCONTINUED | OUTPATIENT
Start: 2022-09-12 | End: 2022-09-16

## 2022-09-12 RX ORDER — HYDROMORPHONE HYDROCHLORIDE 1 MG/ML
0.8 INJECTION, SOLUTION INTRAMUSCULAR; INTRAVENOUS; SUBCUTANEOUS EVERY 2 HOUR PRN
Status: DISCONTINUED | OUTPATIENT
Start: 2022-09-12 | End: 2022-09-16

## 2022-09-12 RX ORDER — AMLODIPINE BESYLATE 10 MG/1
10 TABLET ORAL DAILY
COMMUNITY

## 2022-09-12 RX ORDER — MORPHINE SULFATE 2 MG/ML
2 INJECTION, SOLUTION INTRAMUSCULAR; INTRAVENOUS ONCE
Status: COMPLETED | OUTPATIENT
Start: 2022-09-12 | End: 2022-09-12

## 2022-09-12 RX ORDER — TRAMADOL HYDROCHLORIDE 50 MG/1
50 TABLET ORAL EVERY 6 HOURS PRN
Status: ON HOLD | COMMUNITY
End: 2022-09-15

## 2022-09-12 NOTE — ED QUICK NOTES
Orders for admission, patient is aware of plan and ready to go upstairs. Any questions, please call ED RN yoselin at extension [phone].      Patient Covid vaccination status: Fully vaccinated     COVID Test Ordered in ED: Rapid SARS-CoV-2 by PCR    COVID Suspicion at Admission: Low clinical suspicion for COVID    Running Infusions:  None    Mental Status/LOC at time of transport: x4    Other pertinent information:   CIWA score: N/A   NIH score:  N/A

## 2022-09-12 NOTE — H&P
[facility]    PATIENT'S NAME: Dorcas Lang   ATTENDING PHYSICIAN: Adilene Arellano MD   PATIENT ACCOUNT#:   [de-identified]    LOCATION:  [address] [place] [address] RECORD #:   [id number]       YOB: 1925  ADMISSION DATE:       2022    HISTORY AND PHYSICAL EXAMINATION    CHIEF COMPLAINT:  Necrotic left heel ulcer, developing sacral ulcer and urinary retention. HISTORY OF PRESENT ILLNESS:  Patient is a 80-year-old  female, almost bedbound, who was brought in today for developing right heel ulcer and sacral ulceration. Also she has been having pain in her both groins and deep pelvic areas. CBC showed white blood cell count of 4.3, hemoglobin 9.1. Chemistry showed GFR of 22 which is baseline. Sedimentation rate 69. CT scan of lumbar spine showed severe degenerative joint disease of the thoracolumbar spine with advanced multilevel lumbar spinal stenos. There is moderate to severe distention of the urinary bladder. Patient will be admitted to the hospital for further management. PAST MEDICAL HISTORY:  Patient was hospitalized recently with right necrotic fourth and fifth toes requiring amputation and angioplasty of the right lower extremity for the distal peroneal and popliteal arteries. 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 PAST SURGICAL HISTORY:  Recent right fourth and fifth toe amputation for ischemia, hemithyroidectomy, ovarian cystectomy, right mastectomy for breast cancer, left breast lumpectomy, hysterectomy, right total hip arthroplasty, lumbar laminectomy, ERCP procedure for choledocholithiasis and stone extraction. MEDICATIONS:  Please see medication reconciliation list.      ALLERGIES:  No known drug allergies. FAMILY HISTORY:  Both parents  of heart disease.     SOCIAL HISTORY:  No tobacco, alcohol, or drug use. Almost bedbound. Requires assistance in her basic of basic activities of daily living. REVIEW OF SYSTEMS:  Pain in the right heel area. Also she had deep pelvic pain and difficulty with urination. She refused Foster catheter in the emergency room and she demands only conservative management at this point. Denies any fever or chills. Other 12-point review of systems is negative. PHYSICAL EXAMINATION:    GENERAL:  Alert and oriented to time, place, and person. Hard of hearing. VITAL SIGNS:  Temperature 98.0, pulse 77, respiratory rate 20, blood pressure 160/73, pulse ox 98% on room air. HEENT:  Atraumatic. Oropharynx clear. Moist mucous membranes. Normal hard and soft palate. Eyes:  Anicteric sclerae. Pupils equal, round, reactive. NECK:  Supple. No lymphadenopathy. Trachea midline. Full range of motion. LUNGS:  Clear to auscultation bilaterally. Normal respiratory effort. HEART:  Regular rate, rhythm. S1 and S2 auscultated. No murmur. ABDOMEN:  Soft, nondistended. No tenderness. Positive bowel sounds. EXTREMITIES:  There is +1 edema left leg. Right leg no edema. Right heel with necrotic changes. Well-healed right fourth and fifth toe surgical bed. NEUROLOGIC:  No acute focal defect. ASSESSMENT AND PLAN:    1. Right heel necrotic ulcer, rule out underlying osteomyelitis. 2.   Stage 2 to 3 sacral decubitus pressure ulcer. 3.   Urinary retention and possible underlying neurogenic bladder, responsible for pelvic pain. 4.   Hypertension. Patient will be admitted to general medical floor. Again, patient is demanding only conservative management. She does not want to see the podiatrist who saw her last time. I will obtain urology consult, pain control, conservative management for now. X-ray of the right heel to evaluate for bone involvement and osteomyelitis. Wound service consult. Further recommendations to follow. Dictated By Patrick Garcia MD  d: 09/12/2022 16:27:26  t: 09/12/2022 18:39:29  [facility] [id number]  /

## 2022-09-12 NOTE — ED INITIAL ASSESSMENT (HPI)
Pt from home via EMS with complaint of pain to back down to legs. Wounds to bilateral ankles and sacral wound per medics. Pt denies recent falls.

## 2022-09-13 LAB
ANION GAP SERPL CALC-SCNC: 9 MMOL/L (ref 0–18)
BASOPHILS # BLD AUTO: 0.03 X10(3) UL (ref 0–0.2)
BASOPHILS NFR BLD AUTO: 0.7 %
BUN BLD-MCNC: 34 MG/DL (ref 7–18)
BUN/CREAT SERPL: 18.8 (ref 10–20)
CALCIUM BLD-MCNC: 8.5 MG/DL (ref 8.5–10.1)
CHLORIDE SERPL-SCNC: 110 MMOL/L (ref 98–112)
CO2 SERPL-SCNC: 21 MMOL/L (ref 21–32)
CREAT BLD-MCNC: 1.81 MG/DL
DEPRECATED RDW RBC AUTO: 53.1 FL (ref 35.1–46.3)
EOSINOPHIL # BLD AUTO: 0.21 X10(3) UL (ref 0–0.7)
EOSINOPHIL NFR BLD AUTO: 5.2 %
ERYTHROCYTE [DISTWIDTH] IN BLOOD BY AUTOMATED COUNT: 15.9 % (ref 11–15)
GFR SERPLBLD BASED ON 1.73 SQ M-ARVRAT: 25 ML/MIN/1.73M2 (ref 60–?)
GLUCOSE BLD-MCNC: 82 MG/DL (ref 70–99)
HCT VFR BLD AUTO: 25.9 %
HGB BLD-MCNC: 7.9 G/DL
IMM GRANULOCYTES # BLD AUTO: 0.01 X10(3) UL (ref 0–1)
IMM GRANULOCYTES NFR BLD: 0.2 %
LYMPHOCYTES # BLD AUTO: 0.87 X10(3) UL (ref 1–4)
LYMPHOCYTES NFR BLD AUTO: 21.4 %
MCH RBC QN AUTO: 28 PG (ref 26–34)
MCHC RBC AUTO-ENTMCNC: 30.5 G/DL (ref 31–37)
MCV RBC AUTO: 91.8 FL
MONOCYTES # BLD AUTO: 0.65 X10(3) UL (ref 0.1–1)
MONOCYTES NFR BLD AUTO: 16 %
NEUTROPHILS # BLD AUTO: 2.3 X10 (3) UL (ref 1.5–7.7)
NEUTROPHILS # BLD AUTO: 2.3 X10(3) UL (ref 1.5–7.7)
NEUTROPHILS NFR BLD AUTO: 56.5 %
OSMOLALITY SERPL CALC.SUM OF ELEC: 297 MOSM/KG (ref 275–295)
PLATELET # BLD AUTO: 332 10(3)UL (ref 150–450)
POTASSIUM SERPL-SCNC: 4.5 MMOL/L (ref 3.5–5.1)
RBC # BLD AUTO: 2.82 X10(6)UL
SODIUM SERPL-SCNC: 140 MMOL/L (ref 136–145)
WBC # BLD AUTO: 4.1 X10(3) UL (ref 4–11)

## 2022-09-13 PROCEDURE — 99233 SBSQ HOSP IP/OBS HIGH 50: CPT | Performed by: HOSPITALIST

## 2022-09-13 PROCEDURE — 99221 1ST HOSP IP/OBS SF/LOW 40: CPT | Performed by: PODIATRIST

## 2022-09-13 RX ORDER — TRAMADOL HYDROCHLORIDE 50 MG/1
50 TABLET ORAL EVERY 12 HOURS PRN
Status: DISCONTINUED | OUTPATIENT
Start: 2022-09-13 | End: 2022-09-16

## 2022-09-13 RX ORDER — CALCIUM CARBONATE 200(500)MG
1000 TABLET,CHEWABLE ORAL 3 TIMES DAILY PRN
Status: DISCONTINUED | OUTPATIENT
Start: 2022-09-13 | End: 2022-09-16

## 2022-09-13 NOTE — CM/SW NOTE
09/13/22 1100   CM/SW Referral Data   Referral Source Physician   Reason for Referral   [facility])   Informant Son  (Dr. Desirae Jackson)   Pertinent Medical Hx   Does patient have an established PCP? Yes  (MD at Home via [facility])   Patient [address]   Patient lives with Alone   Patient Status Prior to Admission   Independent with ADLs and Mobility No   Pt. requires assistance with Housework;Driving;Meals; Bathing; Ambulating;Dressing   Services in place prior to admission Home Health Care;DME/Supplies at home;[facility] [address] Provider [address]   DME Provider/Supplier HME   Type of DME/Supplies Wheelchair;Hospital Bed   [facility] Private Duty  ([address])   [address] hours per day 12   [facility] days per week 7   Discharge Needs   Anticipated D/C needs Home health care;Caregiver services   Choice of Post-Acute Provider   Informed patient of right to choose their preferred provider Yes   List of appropriate post-acute services provided to patient/family with quality data   (No - family wants to continue w/ current services)   Patient/family choice [facility] Villalob[facility]  (resume services)     Received MDO for [facility]. SW reviewed pt's chart and also spoke to pt's son Dr. Desirae Jackson via phone. Above assessment completed. SW confirmed pt lives alone but has CG's 7 days/week. Monday through Friday - pt's CG's are there approx 12hrs/day. The CG's help pt get up in the AM and throughout the day. They typically leave after dinner and return later in the evening to help get pt to bed. The CG's are also there on Saturday and Sundays. Per pt's son - the CG's stay overnight on the weekends. Pt is primarily [place] bound but is able to manage at home once the CG's assist w/ setting her up for the day. Pt also has a hospital bed via HME at home - this was arranged by [address] [place] [address] in June 2022.     Pt's son confirmed pt has MD at [facility] via [address]. Pt also has [place] RN for wound care and [place] PT through [facility]. Pt's son inquiring about pt's need for a debridement. Per Farrah Contrersa, pt is open to the debridement but only under Anesthesia and only if she can DC home the same day or day after. LENNIE confirmed to voice these concerns to pt's MD.    LENNIE sent updates w/ DONTAE orders to [facility] via [email or web address]. LENNIE also sent secure chat to pt's RN/Jeancarlos, Dr. Lacie Enriquez, and covering Jassi Oven w/ above info on the debridement. MD confirmed to discuss w/ pt and son. Per son's request, pt likely to need [facility] transport home at DC. Pt's son asks that SW/CM notify him when transport is arranged. Farrah Contreras would also like pt's RN day of DC to notify him when pt actually leaves [facility] in [address] to make sure pt's CG is present at her home when she arrives. LENNIE spoke to [place] nabila[facility] on [address] [facility] for 9/13, 9/14, and 9/15. PCS completed in [employer] and will need date added day of actual DC. PLAN: Home w/ [place] MD at home, [facility] on WILL CALL, PCS completed (needs date) - pending med clear      SW/CM to remain available for support and/or discharge planning.          Danna Gasca, MSW, [facility]

## 2022-09-13 NOTE — PROGRESS NOTES
Straight cathed pt around 0000 for retention. Pt is refusing to be bladder scanned again or touched at end of shift. This RN educated pt on importance of bladder emptying and risk of bladder retention, pt verbalized understanding of instructions, but still refused bladder scan and stated \"I will urinate. Leave me alone until I have my breakfast and get up, and then we can see\". Endorsed to [employer] to follow up.

## 2022-09-13 NOTE — PROGRESS NOTES
[facility] [facility]    CDI Prediction Tool Protocol (Vancomycin Prophylaxis Deferred)      This patient is currently at high risk for developing CDI due to his/her score being >/= 13 points. The current score is: 13    Score Breakdown:  High risk antibiotic use (5 points)  Hypoalbuminemia: < 3g/dL (1 point)  Age >/= 80 years (3 points)  Recently hospitalized: within 90 days (1 point)      However, they are not being initiated on OVP (oral vancomycin prophylaxis) at this time because no active malignancy. This patient does not have C. difficile infection. All measures taken within this protocol are to assess this patient and potentially decrease the risk of CDI development.     Concepcion Cobb, PharmD, [address] [place] [address] Pharmacy Extension: [phone]

## 2022-09-13 NOTE — PLAN OF CARE
Problem: Patient Centered Care  Goal: Patient preferences are identified and integrated in the patient's plan of care  Description: Interventions:  - What would you like us to know as we care for you?  \" I live at home with a caregiver 8 hrs  a day\"  - Provide timely, complete, and accurate information to patient/family  - Incorporate patient and family knowledge, values, beliefs, and cultural backgrounds into the planning and delivery of care  - Encourage patient/family to participate in care and decision-making at the level they choose  - Honor patient and family perspectives and choices  Outcome: Progressing     Problem: PAIN - ADULT  Goal: Verbalizes/displays adequate comfort level or patient's stated pain goal  Description: INTERVENTIONS:  - Encourage pt to monitor pain and request assistance  - Assess pain using appropriate pain scale  - Administer analgesics based on type and severity of pain and evaluate response  - Implement non-pharmacological measures as appropriate and evaluate response  - Consider cultural and social influences on pain and pain management  - Manage/alleviate anxiety  - Utilize distraction and/or relaxation techniques  - Monitor for opioid side effects  - Notify MD/LIP if interventions unsuccessful or patient reports new pain  - Anticipate increased pain with activity and pre-medicate as appropriate  Outcome: Progressing     Problem: SKIN/TISSUE INTEGRITY - ADULT  Goal: Skin integrity remains intact  Description: INTERVENTIONS  - Assess and document risk factors for pressure ulcer development  - Assess and document skin integrity  - Monitor for areas of redness and/or skin breakdown  - Initiate interventions, skin care algorithm/standards of care as needed  Outcome: Progressing  Goal: Incision(s), wounds(s) or drain site(s) healing without S/S of infection  Description: INTERVENTIONS:  - Assess and document risk factors for pressure ulcer development  - Assess and document skin integrity  - Assess and document dressing/incision, wound bed, drain sites and surrounding tissue  - Implement wound care per orders  - Initiate isolation precautions as appropriate  - Initiate Pressure Ulcer prevention bundle as indicated  Outcome: Progressing   Pt alert x3-4, can be forgetful. IV antibiotics continued. Straight cathed pt, was retainin >899ml per bladder scan. 500 ml taken out during straight cath. Now using purewick. Tramadol given for pain. Wound care rendered, would consult today. Bed alarm on. Call light within reach. Intentional rounding maintained.

## 2022-09-13 NOTE — PHYSICAL THERAPY NOTE
PT order received and chart was reviewed. Pt was received supine in bed. Pt stated that she is very tired as she did not sleep well last night and is worried about weight bearing on RLE d/t wound. Pt declined participation with PT at this time due to the above reasons despite encouragement and education provided. Pt stated that recently she has only been able to stand a take a few steps with assistance but is current with [facility] PT. She does have CG services 7 days/week, 12 hrs/day. Will follow up tomorrow as appropriate and able. RN was made aware.

## 2022-09-13 NOTE — WOUND PROGRESS NOTE
Spoke to bedside RN who advised pt's sacral wound is intact/red/healed. DPM dr. Erika Dockery has evaluated pt and is to place orders for the bilateral heel wounds. Wound care signing off for now.

## 2022-09-14 LAB
ANION GAP SERPL CALC-SCNC: 5 MMOL/L (ref 0–18)
BASOPHILS # BLD AUTO: 0.05 X10(3) UL (ref 0–0.2)
BASOPHILS NFR BLD AUTO: 1 %
BUN BLD-MCNC: 28 MG/DL (ref 7–18)
BUN/CREAT SERPL: 16 (ref 10–20)
CALCIUM BLD-MCNC: 8.4 MG/DL (ref 8.5–10.1)
CHLORIDE SERPL-SCNC: 109 MMOL/L (ref 98–112)
CO2 SERPL-SCNC: 24 MMOL/L (ref 21–32)
CREAT BLD-MCNC: 1.75 MG/DL
DEPRECATED RDW RBC AUTO: 52 FL (ref 35.1–46.3)
EOSINOPHIL # BLD AUTO: 0.37 X10(3) UL (ref 0–0.7)
EOSINOPHIL NFR BLD AUTO: 7.4 %
ERYTHROCYTE [DISTWIDTH] IN BLOOD BY AUTOMATED COUNT: 15.8 % (ref 11–15)
GFR SERPLBLD BASED ON 1.73 SQ M-ARVRAT: 26 ML/MIN/1.73M2 (ref 60–?)
GLUCOSE BLD-MCNC: 83 MG/DL (ref 70–99)
HCT VFR BLD AUTO: 26.6 %
HGB BLD-MCNC: 8.1 G/DL
IMM GRANULOCYTES # BLD AUTO: 0.02 X10(3) UL (ref 0–1)
IMM GRANULOCYTES NFR BLD: 0.4 %
LYMPHOCYTES # BLD AUTO: 0.99 X10(3) UL (ref 1–4)
LYMPHOCYTES NFR BLD AUTO: 19.8 %
MCH RBC QN AUTO: 27.7 PG (ref 26–34)
MCHC RBC AUTO-ENTMCNC: 30.5 G/DL (ref 31–37)
MCV RBC AUTO: 91.1 FL
MONOCYTES # BLD AUTO: 0.6 X10(3) UL (ref 0.1–1)
MONOCYTES NFR BLD AUTO: 12 %
NEUTROPHILS # BLD AUTO: 2.97 X10 (3) UL (ref 1.5–7.7)
NEUTROPHILS # BLD AUTO: 2.97 X10(3) UL (ref 1.5–7.7)
NEUTROPHILS NFR BLD AUTO: 59.4 %
OSMOLALITY SERPL CALC.SUM OF ELEC: 291 MOSM/KG (ref 275–295)
PLATELET # BLD AUTO: 313 10(3)UL (ref 150–450)
POTASSIUM SERPL-SCNC: 4.2 MMOL/L (ref 3.5–5.1)
RBC # BLD AUTO: 2.92 X10(6)UL
SODIUM SERPL-SCNC: 138 MMOL/L (ref 136–145)
WBC # BLD AUTO: 5 X10(3) UL (ref 4–11)

## 2022-09-14 PROCEDURE — 99233 SBSQ HOSP IP/OBS HIGH 50: CPT | Performed by: HOSPITALIST

## 2022-09-14 RX ORDER — LIDOCAINE HYDROCHLORIDE 20 MG/ML
10 JELLY TOPICAL AS NEEDED
Status: DISCONTINUED | OUTPATIENT
Start: 2022-09-14 | End: 2022-09-15

## 2022-09-14 RX ORDER — SODIUM CHLORIDE 9 MG/ML
INJECTION, SOLUTION INTRAVENOUS CONTINUOUS
Status: DISCONTINUED | OUTPATIENT
Start: 2022-09-14 | End: 2022-09-15

## 2022-09-14 NOTE — PROGRESS NOTES
09/14/22 0839   Wound 09/12/22 Pressure Injury Heel Right   Date First Assessed/Time First Assessed: 09/12/22 2230   Present on Hospital Admission: Yes  Primary Wound Type: Pressure Injury  Location: Heel  Wound Location Orientation: Right  Wound Description (Comments): R heel pressure ulcer   Wound Image    Site Assessment Black; Moist;Red;Yellow   Drainage Amount Scant   Drainage Description Yellow   Treatments Cleansed; Saline/Wound ;Santyl   Dressing 4x4s;Gauze;Kerlix roll;Tape   Dressing Changed New   Dressing Status Clean;Dry; Intact   Margins Attached edges   Alia-wound Assessment Clean;Dry; Intact   Wound Granulation Tissue Red   Wound Bed Granulation (%) 10 %   Wound Bed Epithelium (%) 0 %   Wound Bed Slough (%) 10 %   Wound Bed Eschar (%) 80 %   Wound Odor None   Pressure Injury Stage U   Wound 09/12/22 Pressure Injury Heel Left   Date First Assessed/Time First Assessed: 09/12/22 2230   Present on Hospital Admission: Yes  Primary Wound Type: Pressure Injury  Location: Heel  Wound Location Orientation: Left  Wound Description (Comments): Left heel pressure injury   Wound Image    Site Assessment Dry;Pink;Yellow   Drainage Amount None   Treatments Cleansed; Saline/Wound ;Santyl   Dressing 4x4s;Kerlix roll;Tape   Dressing Changed Changed   Dressing Status Clean;Dry; Intact   Margins Attached edges   Alia-wound Assessment Dry   Wound Granulation Tissue Pink   Wound Bed Granulation (%) 10 %   Wound Bed Slough (%) 90 %   Wound Odor None   Pressure Injury Stage 3   Wound 09/12/22 Pressure Injury Foot Right;Lateral   Date First Assessed/Time First Assessed: 09/12/22 2230   Present on Hospital Admission: Yes  Primary Wound Type: Pressure Injury  Location: Foot  Wound Location Orientation: Right;Lateral  Wound Description (Comments): 2 pressure injurys to R dorsal foot   Wound Image    Site Assessment Dry;Tan;Yellow   Drainage Amount None   Treatments Cleansed; Saline/Wound ;Santyl   Dressing 4x4s;Gauze;Kerlix roll;Tape   Dressing Changed Changed   Dressing Status Clean;Dry; Intact   Margins Attached edges   Alia-wound Assessment Dry; Intact; Blanchable erythema   Wound Bed Slough (%) 100 %   Wound Odor None   Pressure Injury Stage U     Patient seen today in bed, alert, very [place] , hearing aids at bedside but she states that she will put it on later,not in distress. Bilateral heel and foot wounds assessed. See above documentation. Santyl moist saline gauze applied to all wounds secured with kerlix and tape. Heel protectors in place. Repositioned for comfort. Placed call light within easy reach.

## 2022-09-15 PROCEDURE — 99221 1ST HOSP IP/OBS SF/LOW 40: CPT | Performed by: REGISTERED NURSE

## 2022-09-15 PROCEDURE — 99233 SBSQ HOSP IP/OBS HIGH 50: CPT | Performed by: HOSPITALIST

## 2022-09-15 RX ORDER — CEFADROXIL 500 MG/1
500 CAPSULE ORAL 2 TIMES DAILY
Qty: 6 CAPSULE | Refills: 0 | Status: SHIPPED | OUTPATIENT
Start: 2022-09-15 | End: 2022-09-18

## 2022-09-15 RX ORDER — TRAMADOL HYDROCHLORIDE 50 MG/1
50 TABLET ORAL EVERY 6 HOURS PRN
Qty: 10 TABLET | Refills: 0 | Status: SHIPPED | OUTPATIENT
Start: 2022-09-15

## 2022-09-15 NOTE — CM/SW NOTE
Per Tiffanie Rudd APN - pt to DC home tomorrow 9/16 @ 9:30AM. R-[facility] notified of referral. Renetta Magana with [facility] updated. PLAN: Home w/ Jeb Salinas MD at home - Friday 9/16 @ 9:[facility]am [facility]     SW remains available for support and/or discharge planning. Please do not hesitate to call/chat SW if further DC needs arise.      Eliza Busby MSW, [place], [place]   Ext [phone]

## 2022-09-15 NOTE — CM/SW NOTE
BPCI Bundled Advanced Medicare Program    Plan of care reviewed for care coordination and discharge planning. Noted pt falls under  BPCI/Medicare program, with , W for Urinary Tract Infection     NSOC RADU Proposal:  Home Health pending progress. Plan: Home w/[facility] pending medical clearance. / to remain available for support and/or discharge planning.      HERMAN Zurita    [phone]

## 2022-09-16 VITALS
HEIGHT: 61 IN | SYSTOLIC BLOOD PRESSURE: 148 MMHG | BODY MASS INDEX: 24.93 KG/M2 | RESPIRATION RATE: 16 BRPM | DIASTOLIC BLOOD PRESSURE: 54 MMHG | TEMPERATURE: 98 F | OXYGEN SATURATION: 93 % | WEIGHT: 132.06 LBS | HEART RATE: 60 BPM

## 2022-09-16 PROCEDURE — 99239 HOSP IP/OBS DSCHRG MGMT >30: CPT | Performed by: HOSPITALIST

## 2023-02-08 ENCOUNTER — HOSPITAL ENCOUNTER (EMERGENCY)
Facility: HOSPITAL | Age: 88
Discharge: HOME | End: 2023-02-08
Attending: EMERGENCY MEDICINE
Payer: MEDICARE

## 2023-02-08 VITALS
DIASTOLIC BLOOD PRESSURE: 71 MMHG | SYSTOLIC BLOOD PRESSURE: 156 MMHG | WEIGHT: 130 LBS | BODY MASS INDEX: 24.23 KG/M2 | HEIGHT: 61.5 IN | RESPIRATION RATE: 17 BRPM | TEMPERATURE: 97 F | OXYGEN SATURATION: 98 % | HEART RATE: 68 BPM

## 2023-02-08 DIAGNOSIS — K62.2 ANAL PROLAPSE: Primary | ICD-10-CM

## 2023-02-08 LAB
ANION GAP SERPL CALC-SCNC: 4 MMOL/L (ref 0–18)
BASOPHILS # BLD AUTO: 0.04 X10(3) UL (ref 0–0.2)
BASOPHILS NFR BLD AUTO: 0.8 %
BILIRUB UR QL: NEGATIVE
BUN BLD-MCNC: 55 MG/DL (ref 7–18)
BUN/CREAT SERPL: 26.7 (ref 10–20)
CALCIUM BLD-MCNC: 9.5 MG/DL (ref 8.5–10.1)
CHLORIDE SERPL-SCNC: 114 MMOL/L (ref 98–112)
CO2 SERPL-SCNC: 24 MMOL/L (ref 21–32)
COLOR UR: YELLOW
CREAT BLD-MCNC: 2.06 MG/DL
DEPRECATED RDW RBC AUTO: 54.6 FL (ref 35.1–46.3)
EOSINOPHIL # BLD AUTO: 0.17 X10(3) UL (ref 0–0.7)
EOSINOPHIL NFR BLD AUTO: 3.2 %
ERYTHROCYTE [DISTWIDTH] IN BLOOD BY AUTOMATED COUNT: 16.6 % (ref 11–15)
GFR SERPLBLD BASED ON 1.73 SQ M-ARVRAT: 22 ML/MIN/1.73M2 (ref 60–?)
GLUCOSE BLD-MCNC: 90 MG/DL (ref 70–99)
GLUCOSE UR-MCNC: NEGATIVE MG/DL
HCT VFR BLD AUTO: 30.3 %
HGB BLD-MCNC: 9.5 G/DL
IMM GRANULOCYTES # BLD AUTO: 0.07 X10(3) UL (ref 0–1)
IMM GRANULOCYTES NFR BLD: 1.3 %
KETONES UR-MCNC: NEGATIVE MG/DL
LYMPHOCYTES # BLD AUTO: 0.66 X10(3) UL (ref 1–4)
LYMPHOCYTES NFR BLD AUTO: 12.5 %
MCH RBC QN AUTO: 28.3 PG (ref 26–34)
MCHC RBC AUTO-ENTMCNC: 31.4 G/DL (ref 31–37)
MCV RBC AUTO: 90.2 FL
MONOCYTES # BLD AUTO: 0.36 X10(3) UL (ref 0.1–1)
MONOCYTES NFR BLD AUTO: 6.8 %
NEUTROPHILS # BLD AUTO: 3.98 X10 (3) UL (ref 1.5–7.7)
NEUTROPHILS # BLD AUTO: 3.98 X10(3) UL (ref 1.5–7.7)
NEUTROPHILS NFR BLD AUTO: 75.4 %
NITRITE UR QL STRIP.AUTO: POSITIVE
OSMOLALITY SERPL CALC.SUM OF ELEC: 309 MOSM/KG (ref 275–295)
PH UR: 6 [PH] (ref 5–8)
PLATELET # BLD AUTO: 228 10(3)UL (ref 150–450)
POTASSIUM SERPL-SCNC: 5.3 MMOL/L (ref 3.5–5.1)
RBC # BLD AUTO: 3.36 X10(6)UL
SODIUM SERPL-SCNC: 142 MMOL/L (ref 136–145)
SP GR UR STRIP: 1.01 (ref 1–1.03)
UROBILINOGEN UR STRIP-ACNC: 0.2
WBC # BLD AUTO: 5.3 X10(3) UL (ref 4–11)

## 2023-02-08 PROCEDURE — 99284 EMERGENCY DEPT VISIT MOD MDM: CPT

## 2023-02-08 PROCEDURE — 87077 CULTURE AEROBIC IDENTIFY: CPT | Performed by: EMERGENCY MEDICINE

## 2023-02-08 PROCEDURE — 87184 SC STD DISK METHOD PER PLATE: CPT | Performed by: EMERGENCY MEDICINE

## 2023-02-08 PROCEDURE — 87186 SC STD MICRODIL/AGAR DIL: CPT | Performed by: EMERGENCY MEDICINE

## 2023-02-08 PROCEDURE — 85025 COMPLETE CBC W/AUTO DIFF WBC: CPT | Performed by: EMERGENCY MEDICINE

## 2023-02-08 PROCEDURE — 81015 MICROSCOPIC EXAM OF URINE: CPT | Performed by: EMERGENCY MEDICINE

## 2023-02-08 PROCEDURE — 87086 URINE CULTURE/COLONY COUNT: CPT | Performed by: EMERGENCY MEDICINE

## 2023-02-08 PROCEDURE — 81001 URINALYSIS AUTO W/SCOPE: CPT | Performed by: EMERGENCY MEDICINE

## 2023-02-08 PROCEDURE — 80048 BASIC METABOLIC PNL TOTAL CA: CPT | Performed by: EMERGENCY MEDICINE

## 2023-02-08 PROCEDURE — 36415 COLL VENOUS BLD VENIPUNCTURE: CPT

## 2023-02-08 PROCEDURE — 99285 EMERGENCY DEPT VISIT HI MDM: CPT

## 2023-02-08 RX ORDER — CEPHALEXIN 500 MG/1
500 CAPSULE ORAL ONCE
Status: COMPLETED | OUTPATIENT
Start: 2023-02-08 | End: 2023-02-08

## 2023-02-08 RX ORDER — CEPHALEXIN 500 MG/1
500 CAPSULE ORAL 2 TIMES DAILY
Qty: 20 CAPSULE | Refills: 0 | Status: SHIPPED | OUTPATIENT
Start: 2023-02-08 | End: 2023-02-18

## 2023-02-08 NOTE — CM/SW NOTE
Spoke with patient's son and he is requesting urine culture and and blood work. Requesting guerra replacement but he is pretty sure she will refuse. Then [facility] will call him back @[phone]  with update and he will let her caregiver know. Caregiver in place at home already.

## 2023-02-08 NOTE — ED QUICK NOTES
Patient is alert and oriented. Sleeping on and off. Showing no signs or symptoms of any respiratory distress. Discharge paperwork reviewed with patient, who verbalized understanding. Awaiting transportation to go back home.

## 2023-02-08 NOTE — CM/SW NOTE
Patient's son called Dr. Hawthorne Form @ [phone] VM left requesting call back #.  Vera YANG, 02/08/23, 2:17 PM

## 2023-02-12 ENCOUNTER — HOSPITAL ENCOUNTER (EMERGENCY)
Facility: HOSPITAL | Age: 88
Discharge: HOME | End: 2023-02-13
Attending: EMERGENCY MEDICINE
Payer: MEDICARE

## 2023-02-12 DIAGNOSIS — K56.41 IMPACTED STOOL IN RECTUM (HCC): ICD-10-CM

## 2023-02-12 DIAGNOSIS — I10 HYPERTENSION, UNCONTROLLED: ICD-10-CM

## 2023-02-12 DIAGNOSIS — K62.3 RECTAL PROLAPSE: Primary | ICD-10-CM

## 2023-02-12 LAB
ANION GAP SERPL CALC-SCNC: 3 MMOL/L (ref 0–18)
BASOPHILS # BLD AUTO: 0.04 X10(3) UL (ref 0–0.2)
BASOPHILS NFR BLD AUTO: 0.6 %
BUN BLD-MCNC: 50 MG/DL (ref 7–18)
BUN/CREAT SERPL: 23.1 (ref 10–20)
CALCIUM BLD-MCNC: 9.7 MG/DL (ref 8.5–10.1)
CHLORIDE SERPL-SCNC: 112 MMOL/L (ref 98–112)
CO2 SERPL-SCNC: 25 MMOL/L (ref 21–32)
CREAT BLD-MCNC: 2.16 MG/DL
DEPRECATED RDW RBC AUTO: 55.1 FL (ref 35.1–46.3)
EOSINOPHIL # BLD AUTO: 0.16 X10(3) UL (ref 0–0.7)
EOSINOPHIL NFR BLD AUTO: 2.2 %
ERYTHROCYTE [DISTWIDTH] IN BLOOD BY AUTOMATED COUNT: 16.6 % (ref 11–15)
GFR SERPLBLD BASED ON 1.73 SQ M-ARVRAT: 20 ML/MIN/1.73M2 (ref 60–?)
GLUCOSE BLD-MCNC: 121 MG/DL (ref 70–99)
HCT VFR BLD AUTO: 33.7 %
HGB BLD-MCNC: 10.7 G/DL
IMM GRANULOCYTES # BLD AUTO: 0.02 X10(3) UL (ref 0–1)
IMM GRANULOCYTES NFR BLD: 0.3 %
LYMPHOCYTES # BLD AUTO: 0.6 X10(3) UL (ref 1–4)
LYMPHOCYTES NFR BLD AUTO: 8.3 %
MCH RBC QN AUTO: 28.8 PG (ref 26–34)
MCHC RBC AUTO-ENTMCNC: 31.8 G/DL (ref 31–37)
MCV RBC AUTO: 90.8 FL
MONOCYTES # BLD AUTO: 0.49 X10(3) UL (ref 0.1–1)
MONOCYTES NFR BLD AUTO: 6.7 %
NEUTROPHILS # BLD AUTO: 5.96 X10 (3) UL (ref 1.5–7.7)
NEUTROPHILS # BLD AUTO: 5.96 X10(3) UL (ref 1.5–7.7)
NEUTROPHILS NFR BLD AUTO: 81.9 %
OSMOLALITY SERPL CALC.SUM OF ELEC: 305 MOSM/KG (ref 275–295)
PLATELET # BLD AUTO: 273 10(3)UL (ref 150–450)
POTASSIUM SERPL-SCNC: 5 MMOL/L (ref 3.5–5.1)
RBC # BLD AUTO: 3.71 X10(6)UL
SODIUM SERPL-SCNC: 140 MMOL/L (ref 136–145)
WBC # BLD AUTO: 7.3 X10(3) UL (ref 4–11)

## 2023-02-12 PROCEDURE — 36415 COLL VENOUS BLD VENIPUNCTURE: CPT

## 2023-02-12 PROCEDURE — 80048 BASIC METABOLIC PNL TOTAL CA: CPT | Performed by: EMERGENCY MEDICINE

## 2023-02-12 PROCEDURE — 99284 EMERGENCY DEPT VISIT MOD MDM: CPT

## 2023-02-12 PROCEDURE — 99285 EMERGENCY DEPT VISIT HI MDM: CPT

## 2023-02-12 PROCEDURE — 85025 COMPLETE CBC W/AUTO DIFF WBC: CPT | Performed by: EMERGENCY MEDICINE

## 2023-02-13 VITALS
SYSTOLIC BLOOD PRESSURE: 132 MMHG | TEMPERATURE: 97 F | RESPIRATION RATE: 16 BRPM | OXYGEN SATURATION: 98 % | HEART RATE: 71 BPM | DIASTOLIC BLOOD PRESSURE: 75 MMHG

## 2023-02-13 RX ORDER — LIDOCAINE HYDROCHLORIDE 20 MG/ML
10 JELLY TOPICAL ONCE
Status: DISCONTINUED | OUTPATIENT
Start: 2023-02-13 | End: 2023-02-13

## 2023-02-13 RX ORDER — ATENOLOL 25 MG/1
25 TABLET ORAL ONCE
Status: COMPLETED | OUTPATIENT
Start: 2023-02-13 | End: 2023-02-13

## 2023-02-13 NOTE — CM/SW NOTE
PLEASE CALL THE CARE TAKER Sri Real *[phone]  WHEN THE MEDICAR IS LEAVING WITH PATIENT! Thank you so very much.   Raul Lemos MSN, MARCO, RN  Emergency Department   Extension [phone]

## 2023-02-13 NOTE — ED QUICK NOTES
This RN spoke with [facility] and informed her that ambulance had arrived. Patient is getting dressed to prepare for transport home.

## 2023-02-13 NOTE — CM/SW NOTE
EDCM CALLED AND LEFT MESSAGE WITH CAREGIVER THAT PT WILL BE DISCHARGED HOME IN THE MORNING AND TO PLEASE CALL EDCM BACK. OZZIEM SPOKE WITH SON DR Wisam Steve AND WE WILL HAVE THE [address] PICK PATIENT UP AT 0700 TO TAKE HER HOME.  PT'S SON DR IBARRA WILL MAKE SURE TO HAVE THE CARE GIVER AT THE HOUSE TO MEET THEM. EDC WILL CALL SON AT 0600 TO INFORM HIM OF TIME.      Albert BRAGG, MARCO, RN  Emergency Department   Extension [phone]

## 2023-02-13 NOTE — CM/SW NOTE
RADHA JUST RECEIVED A CALL FROM [phone] THE CAREGIVER FOR THIS PATIENT. SHE STATED SHE WILL BE AT THE PATIENTS HOME AT 0700 AWAITING ARRIVAL OF PATIENT. SHE WILL CALL AT 0700 TO CONFIRM THAT NOTHING HAS CHANGED. RADHA WILL CALL DR Virgie Mccabe AND INFORM HIM OF THE ABOVE!     Carmel Olivares MSN, MARCO, RN  Emergency Department   Extension [phone]

## 2023-02-13 NOTE — PROGRESS NOTES
ED Culture Callback Results Review  Pharmacist reviewed culture results from ED visit     Positive  urine culture noted. caregiver, Lena Xie contacted by phone. Informed her of the patient's positive urine culture. She stated the patient has improved since starting on keflex. Informed her since patient is improving and not having any other signs or symptoms of a UTI that we will continue the current therapy. caregiver verbalized understanding of updated treatment plan, all questions answered at this time.

## 2023-02-13 NOTE — ED QUICK NOTES
Patient care endorsed to me per primary RN. Plan is for patient to be discharged home in the morning when a care giver is at the home. Patient remains on cardiac monitor and being monitored closely.

## 2023-02-13 NOTE — ED INITIAL ASSESSMENT (HPI)
Pt came in via EMS w/ c/o rectal pain. Per pt was seen here three days ago for same problem and discharged. Pt states pain is progressing w/ no relief. Pt states lives at home and three different care givers. Pt has bilateral hearing aids in place. Pt denies all other complaints at the moment. Pt has a urinary guerra in place which per pt is for urinary retention. Pt states hasn't taken BP med atenolol at home for the past couple of days but has been taking amlodipine. 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 This RN explained that BP medication is important and that BP's in the 140's are not considered low.

## 2023-10-23 NOTE — CM/SW NOTE
09/21/18 1500   CM/SW Referral Data   Referral Source    Reason for Referral Discharge planning   Informant Patient   Pertinent Medical Hx   Primary Care Physician Name Aurora Banks   Patient Info   Patient's Mental Status Alert   Patient's H Patient called in for percocet  sent to [facility]

## 2024-01-01 ENCOUNTER — APPOINTMENT (OUTPATIENT)
Dept: GENERAL RADIOLOGY | Facility: HOSPITAL | Age: 89
End: 2024-01-01
Payer: MEDICARE

## 2024-01-01 ENCOUNTER — APPOINTMENT (OUTPATIENT)
Dept: GENERAL RADIOLOGY | Facility: HOSPITAL | Age: 89
End: 2024-01-01
Attending: HOSPITALIST
Payer: MEDICARE

## 2024-01-01 ENCOUNTER — HOSPITAL ENCOUNTER (INPATIENT)
Facility: HOSPITAL | Age: 89
LOS: 5 days | End: 2025-01-01
Attending: HOSPITALIST | Admitting: HOSPITALIST
Payer: OTHER MISCELLANEOUS

## 2024-01-01 ENCOUNTER — APPOINTMENT (OUTPATIENT)
Dept: CT IMAGING | Facility: HOSPITAL | Age: 89
End: 2024-01-01
Attending: HOSPITALIST
Payer: MEDICARE

## 2024-01-01 ENCOUNTER — TELEPHONE (OUTPATIENT)
Dept: CASE MANAGEMENT | Facility: HOSPITAL | Age: 89
End: 2024-01-01

## 2024-01-01 ENCOUNTER — HOSPITAL ENCOUNTER (INPATIENT)
Facility: HOSPITAL | Age: 89
LOS: 5 days | Discharge: HOSPICE | End: 2024-01-01
Attending: EMERGENCY MEDICINE | Admitting: HOSPITALIST
Payer: MEDICARE

## 2024-01-01 VITALS
DIASTOLIC BLOOD PRESSURE: 98 MMHG | TEMPERATURE: 97 F | RESPIRATION RATE: 22 BRPM | HEIGHT: 61 IN | BODY MASS INDEX: 25.64 KG/M2 | SYSTOLIC BLOOD PRESSURE: 154 MMHG | OXYGEN SATURATION: 96 % | HEART RATE: 64 BPM | WEIGHT: 135.81 LBS

## 2024-01-01 DIAGNOSIS — J10.1 INFLUENZA A: Primary | ICD-10-CM

## 2024-01-01 DIAGNOSIS — N17.9 AKI (ACUTE KIDNEY INJURY) (HCC): ICD-10-CM

## 2024-01-01 DIAGNOSIS — R53.1 WEAKNESS: ICD-10-CM

## 2024-01-01 LAB
ALBUMIN SERPL-MCNC: 4 G/DL (ref 3.2–4.8)
ALBUMIN/GLOB SERPL: 1.1 {RATIO} (ref 1–2)
ALP LIVER SERPL-CCNC: 135 U/L
ALT SERPL-CCNC: 9 U/L
ANION GAP SERPL CALC-SCNC: 10 MMOL/L (ref 0–18)
ANION GAP SERPL CALC-SCNC: 10 MMOL/L (ref 0–18)
ANION GAP SERPL CALC-SCNC: 7 MMOL/L (ref 0–18)
ANION GAP SERPL CALC-SCNC: 7 MMOL/L (ref 0–18)
ANION GAP SERPL CALC-SCNC: 8 MMOL/L (ref 0–18)
AST SERPL-CCNC: 21 U/L (ref ?–34)
ATRIAL RATE: 368 BPM
BASOPHILS # BLD AUTO: 0.01 X10(3) UL (ref 0–0.2)
BASOPHILS # BLD AUTO: 0.03 X10(3) UL (ref 0–0.2)
BASOPHILS NFR BLD AUTO: 0.2 %
BASOPHILS NFR BLD AUTO: 0.3 %
BASOPHILS NFR BLD AUTO: 0.6 %
BILIRUB SERPL-MCNC: 0.2 MG/DL (ref 0.2–0.9)
BILIRUB UR QL: NEGATIVE
BUN BLD-MCNC: 37 MG/DL (ref 9–23)
BUN BLD-MCNC: 37 MG/DL (ref 9–23)
BUN BLD-MCNC: 44 MG/DL (ref 9–23)
BUN BLD-MCNC: 48 MG/DL (ref 9–23)
BUN BLD-MCNC: 52 MG/DL (ref 9–23)
BUN/CREAT SERPL: 15.9 (ref 10–20)
BUN/CREAT SERPL: 18 (ref 10–20)
BUN/CREAT SERPL: 23 (ref 10–20)
BUN/CREAT SERPL: 23 (ref 10–20)
BUN/CREAT SERPL: 23.6 (ref 10–20)
CALCIUM BLD-MCNC: 8.7 MG/DL (ref 8.7–10.4)
CALCIUM BLD-MCNC: 8.8 MG/DL (ref 8.7–10.4)
CALCIUM BLD-MCNC: 8.9 MG/DL (ref 8.7–10.4)
CALCIUM BLD-MCNC: 9 MG/DL (ref 8.7–10.4)
CALCIUM BLD-MCNC: 9.7 MG/DL (ref 8.7–10.4)
CHLORIDE SERPL-SCNC: 106 MMOL/L (ref 98–112)
CHLORIDE SERPL-SCNC: 108 MMOL/L (ref 98–112)
CHLORIDE SERPL-SCNC: 108 MMOL/L (ref 98–112)
CHLORIDE SERPL-SCNC: 112 MMOL/L (ref 98–112)
CHLORIDE SERPL-SCNC: 113 MMOL/L (ref 98–112)
CLARITY UR: CLEAR
CO2 SERPL-SCNC: 21 MMOL/L (ref 21–32)
CO2 SERPL-SCNC: 22 MMOL/L (ref 21–32)
CO2 SERPL-SCNC: 22 MMOL/L (ref 21–32)
CO2 SERPL-SCNC: 23 MMOL/L (ref 21–32)
CO2 SERPL-SCNC: 25 MMOL/L (ref 21–32)
COLOR UR: COLORLESS
CREAT BLD-MCNC: 1.91 MG/DL
CREAT BLD-MCNC: 2.05 MG/DL
CREAT BLD-MCNC: 2.09 MG/DL
CREAT BLD-MCNC: 2.2 MG/DL
CREAT BLD-MCNC: 2.33 MG/DL
DEPRECATED RDW RBC AUTO: 59.5 FL (ref 35.1–46.3)
DEPRECATED RDW RBC AUTO: 62.7 FL (ref 35.1–46.3)
DEPRECATED RDW RBC AUTO: 62.9 FL (ref 35.1–46.3)
EGFRCR SERPLBLD CKD-EPI 2021: 18 ML/MIN/1.73M2 (ref 60–?)
EGFRCR SERPLBLD CKD-EPI 2021: 20 ML/MIN/1.73M2 (ref 60–?)
EGFRCR SERPLBLD CKD-EPI 2021: 21 ML/MIN/1.73M2 (ref 60–?)
EGFRCR SERPLBLD CKD-EPI 2021: 21 ML/MIN/1.73M2 (ref 60–?)
EGFRCR SERPLBLD CKD-EPI 2021: 23 ML/MIN/1.73M2 (ref 60–?)
EOSINOPHIL # BLD AUTO: 0 X10(3) UL (ref 0–0.7)
EOSINOPHIL # BLD AUTO: 0.01 X10(3) UL (ref 0–0.7)
EOSINOPHIL # BLD AUTO: 0.02 X10(3) UL (ref 0–0.7)
EOSINOPHIL NFR BLD AUTO: 0 %
EOSINOPHIL NFR BLD AUTO: 0.2 %
EOSINOPHIL NFR BLD AUTO: 0.4 %
ERYTHROCYTE [DISTWIDTH] IN BLOOD BY AUTOMATED COUNT: 18.3 % (ref 11–15)
ERYTHROCYTE [DISTWIDTH] IN BLOOD BY AUTOMATED COUNT: 18.6 % (ref 11–15)
ERYTHROCYTE [DISTWIDTH] IN BLOOD BY AUTOMATED COUNT: 18.8 % (ref 11–15)
ERYTHROCYTE [DISTWIDTH] IN BLOOD BY AUTOMATED COUNT: 18.8 % (ref 11–15)
ERYTHROCYTE [DISTWIDTH] IN BLOOD BY AUTOMATED COUNT: 18.9 % (ref 11–15)
FLUAV + FLUBV RNA SPEC NAA+PROBE: NEGATIVE
FLUAV + FLUBV RNA SPEC NAA+PROBE: POSITIVE
GLOBULIN PLAS-MCNC: 3.5 G/DL (ref 2–3.5)
GLUCOSE BLD-MCNC: 114 MG/DL (ref 70–99)
GLUCOSE BLD-MCNC: 70 MG/DL (ref 70–99)
GLUCOSE BLD-MCNC: 72 MG/DL (ref 70–99)
GLUCOSE BLD-MCNC: 84 MG/DL (ref 70–99)
GLUCOSE BLD-MCNC: 89 MG/DL (ref 70–99)
GLUCOSE BLDC GLUCOMTR-MCNC: 92 MG/DL (ref 70–99)
GLUCOSE UR-MCNC: NORMAL MG/DL
HCT VFR BLD AUTO: 25.5 %
HCT VFR BLD AUTO: 29.6 %
HCT VFR BLD AUTO: 29.7 %
HCT VFR BLD AUTO: 30.2 %
HCT VFR BLD AUTO: 30.4 %
HGB BLD-MCNC: 8.6 G/DL
HGB BLD-MCNC: 9.3 G/DL
HGB BLD-MCNC: 9.4 G/DL
HGB BLD-MCNC: 9.6 G/DL
HGB BLD-MCNC: 9.8 G/DL
HGB UR QL STRIP.AUTO: NEGATIVE
IMM GRANULOCYTES # BLD AUTO: 0.02 X10(3) UL (ref 0–1)
IMM GRANULOCYTES # BLD AUTO: 0.02 X10(3) UL (ref 0–1)
IMM GRANULOCYTES # BLD AUTO: 0.03 X10(3) UL (ref 0–1)
IMM GRANULOCYTES # BLD AUTO: 0.04 X10(3) UL (ref 0–1)
IMM GRANULOCYTES # BLD AUTO: 0.08 X10(3) UL (ref 0–1)
IMM GRANULOCYTES NFR BLD: 0.3 %
IMM GRANULOCYTES NFR BLD: 0.5 %
IMM GRANULOCYTES NFR BLD: 0.5 %
IMM GRANULOCYTES NFR BLD: 0.8 %
IMM GRANULOCYTES NFR BLD: 1.6 %
KETONES UR-MCNC: NEGATIVE MG/DL
LACTATE SERPL-SCNC: 0.9 MMOL/L (ref 0.5–2)
LEUKOCYTE ESTERASE UR QL STRIP.AUTO: NEGATIVE
LYMPHOCYTES # BLD AUTO: 0.3 X10(3) UL (ref 1–4)
LYMPHOCYTES # BLD AUTO: 0.35 X10(3) UL (ref 1–4)
LYMPHOCYTES # BLD AUTO: 0.37 X10(3) UL (ref 1–4)
LYMPHOCYTES # BLD AUTO: 0.39 X10(3) UL (ref 1–4)
LYMPHOCYTES # BLD AUTO: 0.44 X10(3) UL (ref 1–4)
LYMPHOCYTES NFR BLD AUTO: 6.2 %
LYMPHOCYTES NFR BLD AUTO: 7.2 %
LYMPHOCYTES NFR BLD AUTO: 7.3 %
LYMPHOCYTES NFR BLD AUTO: 7.7 %
LYMPHOCYTES NFR BLD AUTO: 7.9 %
MCH RBC QN AUTO: 28.1 PG (ref 26–34)
MCH RBC QN AUTO: 29.4 PG (ref 26–34)
MCH RBC QN AUTO: 29.4 PG (ref 26–34)
MCH RBC QN AUTO: 29.5 PG (ref 26–34)
MCH RBC QN AUTO: 30.2 PG (ref 26–34)
MCHC RBC AUTO-ENTMCNC: 30.6 G/DL (ref 31–37)
MCHC RBC AUTO-ENTMCNC: 31.6 G/DL (ref 31–37)
MCHC RBC AUTO-ENTMCNC: 32.4 G/DL (ref 31–37)
MCHC RBC AUTO-ENTMCNC: 32.5 G/DL (ref 31–37)
MCHC RBC AUTO-ENTMCNC: 33.7 G/DL (ref 31–37)
MCV RBC AUTO: 89.5 FL
MCV RBC AUTO: 90.7 FL
MCV RBC AUTO: 91.1 FL
MCV RBC AUTO: 91.8 FL
MCV RBC AUTO: 92.8 FL
MONOCYTES # BLD AUTO: 0.12 X10(3) UL (ref 0.1–1)
MONOCYTES # BLD AUTO: 0.2 X10(3) UL (ref 0.1–1)
MONOCYTES # BLD AUTO: 0.27 X10(3) UL (ref 0.1–1)
MONOCYTES # BLD AUTO: 0.33 X10(3) UL (ref 0.1–1)
MONOCYTES # BLD AUTO: 0.36 X10(3) UL (ref 0.1–1)
MONOCYTES NFR BLD AUTO: 3.2 %
MONOCYTES NFR BLD AUTO: 3.8 %
MONOCYTES NFR BLD AUTO: 4.5 %
MONOCYTES NFR BLD AUTO: 6.3 %
MONOCYTES NFR BLD AUTO: 6.8 %
NEUTROPHILS # BLD AUTO: 3.34 X10 (3) UL (ref 1.5–7.7)
NEUTROPHILS # BLD AUTO: 3.34 X10(3) UL (ref 1.5–7.7)
NEUTROPHILS # BLD AUTO: 4.06 X10 (3) UL (ref 1.5–7.7)
NEUTROPHILS # BLD AUTO: 4.06 X10(3) UL (ref 1.5–7.7)
NEUTROPHILS # BLD AUTO: 4.67 X10 (3) UL (ref 1.5–7.7)
NEUTROPHILS # BLD AUTO: 4.67 X10(3) UL (ref 1.5–7.7)
NEUTROPHILS # BLD AUTO: 4.9 X10 (3) UL (ref 1.5–7.7)
NEUTROPHILS # BLD AUTO: 4.9 X10(3) UL (ref 1.5–7.7)
NEUTROPHILS # BLD AUTO: 5.31 X10 (3) UL (ref 1.5–7.7)
NEUTROPHILS # BLD AUTO: 5.31 X10(3) UL (ref 1.5–7.7)
NEUTROPHILS NFR BLD AUTO: 83.4 %
NEUTROPHILS NFR BLD AUTO: 85.1 %
NEUTROPHILS NFR BLD AUTO: 87.9 %
NEUTROPHILS NFR BLD AUTO: 88.1 %
NEUTROPHILS NFR BLD AUTO: 88.8 %
NITRITE UR QL STRIP.AUTO: NEGATIVE
OSMOLALITY SERPL CALC.SUM OF ELEC: 293 MOSM/KG (ref 275–295)
OSMOLALITY SERPL CALC.SUM OF ELEC: 293 MOSM/KG (ref 275–295)
OSMOLALITY SERPL CALC.SUM OF ELEC: 303 MOSM/KG (ref 275–295)
OSMOLALITY SERPL CALC.SUM OF ELEC: 308 MOSM/KG (ref 275–295)
OSMOLALITY SERPL CALC.SUM OF ELEC: 309 MOSM/KG (ref 275–295)
P AXIS: 151 DEGREES
PH UR: 5 [PH] (ref 5–8)
PLATELET # BLD AUTO: 207 10(3)UL (ref 150–450)
PLATELET # BLD AUTO: 226 10(3)UL (ref 150–450)
PLATELET # BLD AUTO: 239 10(3)UL (ref 150–450)
PLATELET # BLD AUTO: 259 10(3)UL (ref 150–450)
PLATELET # BLD AUTO: 270 10(3)UL (ref 150–450)
POTASSIUM SERPL-SCNC: 4.1 MMOL/L (ref 3.5–5.1)
POTASSIUM SERPL-SCNC: 4.5 MMOL/L (ref 3.5–5.1)
POTASSIUM SERPL-SCNC: 4.8 MMOL/L (ref 3.5–5.1)
POTASSIUM SERPL-SCNC: 5.2 MMOL/L (ref 3.5–5.1)
POTASSIUM SERPL-SCNC: 5.5 MMOL/L (ref 3.5–5.1)
PROT SERPL-MCNC: 7.5 G/DL (ref 5.7–8.2)
PROT UR-MCNC: NEGATIVE MG/DL
Q-T INTERVAL: 386 MS
Q-T INTERVAL: 438 MS
QRS DURATION: 136 MS
QRS DURATION: 138 MS
QTC CALCULATION (BEZET): 444 MS
QTC CALCULATION (BEZET): 477 MS
R AXIS: 43 DEGREES
R AXIS: 54 DEGREES
RBC # BLD AUTO: 2.85 X10(6)UL
RBC # BLD AUTO: 3.2 X10(6)UL
RBC # BLD AUTO: 3.25 X10(6)UL
RBC # BLD AUTO: 3.31 X10(6)UL
RBC # BLD AUTO: 3.33 X10(6)UL
RSV RNA SPEC NAA+PROBE: NEGATIVE
SARS-COV-2 RNA RESP QL NAA+PROBE: NOT DETECTED
SODIUM SERPL-SCNC: 138 MMOL/L (ref 136–145)
SODIUM SERPL-SCNC: 138 MMOL/L (ref 136–145)
SODIUM SERPL-SCNC: 139 MMOL/L (ref 136–145)
SODIUM SERPL-SCNC: 143 MMOL/L (ref 136–145)
SODIUM SERPL-SCNC: 144 MMOL/L (ref 136–145)
SP GR UR STRIP: 1.01 (ref 1–1.03)
T AXIS: -40 DEGREES
T AXIS: -82 DEGREES
T4 FREE SERPL-MCNC: 0.8 NG/DL (ref 0.8–1.7)
TSI SER-ACNC: 8.5 UIU/ML (ref 0.55–4.78)
UROBILINOGEN UR STRIP-ACNC: NORMAL
VENTRICULAR RATE: 62 BPM
VENTRICULAR RATE: 92 BPM
WBC # BLD AUTO: 3.8 X10(3) UL (ref 4–11)
WBC # BLD AUTO: 4.9 X10(3) UL (ref 4–11)
WBC # BLD AUTO: 5.3 X10(3) UL (ref 4–11)
WBC # BLD AUTO: 5.8 X10(3) UL (ref 4–11)
WBC # BLD AUTO: 6 X10(3) UL (ref 4–11)

## 2024-01-01 PROCEDURE — 70450 CT HEAD/BRAIN W/O DYE: CPT | Performed by: HOSPITALIST

## 2024-01-01 PROCEDURE — 95816 EEG AWAKE AND DROWSY: CPT | Performed by: OTHER

## 2024-01-01 PROCEDURE — 71045 X-RAY EXAM CHEST 1 VIEW: CPT | Performed by: EMERGENCY MEDICINE

## 2024-01-01 PROCEDURE — 99233 SBSQ HOSP IP/OBS HIGH 50: CPT | Performed by: INTERNAL MEDICINE

## 2024-01-01 PROCEDURE — 99233 SBSQ HOSP IP/OBS HIGH 50: CPT | Performed by: HOSPITALIST

## 2024-01-01 PROCEDURE — 99222 1ST HOSP IP/OBS MODERATE 55: CPT | Performed by: REGISTERED NURSE

## 2024-01-01 PROCEDURE — 99223 1ST HOSP IP/OBS HIGH 75: CPT | Performed by: OTHER

## 2024-01-01 PROCEDURE — 71045 X-RAY EXAM CHEST 1 VIEW: CPT | Performed by: HOSPITALIST

## 2024-01-01 PROCEDURE — 99223 1ST HOSP IP/OBS HIGH 75: CPT | Performed by: HOSPITALIST

## 2024-01-01 RX ORDER — ATENOLOL 25 MG/1
25 TABLET ORAL DAILY
Status: DISCONTINUED | OUTPATIENT
Start: 2024-01-01 | End: 2024-01-01

## 2024-01-01 RX ORDER — METHYLPREDNISOLONE SODIUM SUCCINATE 40 MG/ML
20 INJECTION INTRAMUSCULAR; INTRAVENOUS EVERY 12 HOURS
Status: DISCONTINUED | OUTPATIENT
Start: 2024-01-01 | End: 2024-01-01

## 2024-01-01 RX ORDER — ALBUTEROL SULFATE 90 UG/1
2 INHALANT RESPIRATORY (INHALATION) EVERY 4 HOURS PRN
COMMUNITY
Start: 2024-01-01

## 2024-01-01 RX ORDER — METOCLOPRAMIDE HYDROCHLORIDE 5 MG/ML
10 INJECTION INTRAMUSCULAR; INTRAVENOUS EVERY 8 HOURS PRN
Status: DISCONTINUED | OUTPATIENT
Start: 2024-01-01 | End: 2024-01-01

## 2024-01-01 RX ORDER — SCOLOPAMINE TRANSDERMAL SYSTEM 1 MG/1
1 PATCH, EXTENDED RELEASE TRANSDERMAL
Status: DISCONTINUED | OUTPATIENT
Start: 2024-01-01 | End: 2025-01-01

## 2024-01-01 RX ORDER — ONDANSETRON 2 MG/ML
4 INJECTION INTRAMUSCULAR; INTRAVENOUS EVERY 6 HOURS PRN
Status: DISCONTINUED | OUTPATIENT
Start: 2024-01-01 | End: 2025-01-01

## 2024-01-01 RX ORDER — SODIUM CHLORIDE 9 MG/ML
INJECTION, SOLUTION INTRAVENOUS CONTINUOUS
Status: ACTIVE | OUTPATIENT
Start: 2024-01-01 | End: 2024-01-01

## 2024-01-01 RX ORDER — OSELTAMIVIR PHOSPHATE 30 MG/1
30 CAPSULE ORAL
Status: DISPENSED | OUTPATIENT
Start: 2024-01-01 | End: 2024-01-01

## 2024-01-01 RX ORDER — HYDROCHLOROTHIAZIDE 25 MG/1
25 TABLET ORAL 2 TIMES DAILY
COMMUNITY
Start: 2024-01-01

## 2024-01-01 RX ORDER — METOCLOPRAMIDE HYDROCHLORIDE 5 MG/ML
5 INJECTION INTRAMUSCULAR; INTRAVENOUS EVERY 8 HOURS PRN
Status: DISCONTINUED | OUTPATIENT
Start: 2024-01-01 | End: 2024-01-01

## 2024-01-01 RX ORDER — ACETAMINOPHEN 500 MG
500 TABLET ORAL EVERY 4 HOURS PRN
Status: DISCONTINUED | OUTPATIENT
Start: 2024-01-01 | End: 2024-01-01

## 2024-01-01 RX ORDER — OSELTAMIVIR PHOSPHATE 30 MG/1
30 CAPSULE ORAL ONCE
Status: COMPLETED | OUTPATIENT
Start: 2024-01-01 | End: 2024-01-01

## 2024-01-01 RX ORDER — SODIUM PHOSPHATE, DIBASIC AND SODIUM PHOSPHATE, MONOBASIC 7; 19 G/230ML; G/230ML
1 ENEMA RECTAL ONCE AS NEEDED
Status: DISCONTINUED | OUTPATIENT
Start: 2024-01-01 | End: 2025-01-01

## 2024-01-01 RX ORDER — IPRATROPIUM BROMIDE AND ALBUTEROL SULFATE 2.5; .5 MG/3ML; MG/3ML
3 SOLUTION RESPIRATORY (INHALATION) EVERY 6 HOURS PRN
Status: DISCONTINUED | OUTPATIENT
Start: 2024-01-01 | End: 2024-01-01

## 2024-01-01 RX ORDER — ASPIRIN 81 MG/1
81 TABLET, CHEWABLE ORAL DAILY
Status: DISCONTINUED | OUTPATIENT
Start: 2024-01-01 | End: 2024-01-01

## 2024-01-01 RX ORDER — LORAZEPAM 2 MG/ML
1 INJECTION INTRAMUSCULAR EVERY 4 HOURS PRN
Status: DISCONTINUED | OUTPATIENT
Start: 2024-01-01 | End: 2025-01-01

## 2024-01-01 RX ORDER — OSELTAMIVIR PHOSPHATE 30 MG/1
30 CAPSULE ORAL DAILY
Status: DISCONTINUED | OUTPATIENT
Start: 2024-01-01 | End: 2024-01-01

## 2024-01-01 RX ORDER — SODIUM CHLORIDE 0.9 % (FLUSH) 0.9 %
10 SYRINGE (ML) INJECTION AS NEEDED
Status: DISCONTINUED | OUTPATIENT
Start: 2024-01-01 | End: 2025-01-01

## 2024-01-01 RX ORDER — SODIUM CHLORIDE 9 MG/ML
INJECTION, SOLUTION INTRAVENOUS CONTINUOUS
Status: DISCONTINUED | OUTPATIENT
Start: 2024-01-01 | End: 2024-01-01

## 2024-01-01 RX ORDER — AMLODIPINE BESYLATE 5 MG/1
5 TABLET ORAL DAILY
Status: DISCONTINUED | OUTPATIENT
Start: 2024-01-01 | End: 2024-01-01

## 2024-01-01 RX ORDER — METOPROLOL TARTRATE 1 MG/ML
5 INJECTION, SOLUTION INTRAVENOUS EVERY 6 HOURS PRN
Status: DISCONTINUED | OUTPATIENT
Start: 2024-01-01 | End: 2024-01-01

## 2024-01-01 RX ORDER — LORAZEPAM 2 MG/ML
2 INJECTION INTRAMUSCULAR EVERY 4 HOURS PRN
Status: DISCONTINUED | OUTPATIENT
Start: 2024-01-01 | End: 2025-01-01

## 2024-01-01 RX ORDER — PSEUDOEPHEDRINE HCL 30 MG
100 TABLET ORAL
COMMUNITY
Start: 2023-02-08

## 2024-01-01 RX ORDER — BISACODYL 10 MG
10 SUPPOSITORY, RECTAL RECTAL
Status: DISCONTINUED | OUTPATIENT
Start: 2024-01-01 | End: 2025-01-01

## 2024-01-01 RX ORDER — METHYLPREDNISOLONE SODIUM SUCCINATE 40 MG/ML
40 INJECTION INTRAMUSCULAR; INTRAVENOUS EVERY 12 HOURS
Status: DISCONTINUED | OUTPATIENT
Start: 2024-01-01 | End: 2024-01-01

## 2024-01-01 RX ORDER — AMLODIPINE BESYLATE 5 MG/1
5 TABLET ORAL DAILY
COMMUNITY

## 2024-01-01 RX ORDER — IPRATROPIUM BROMIDE AND ALBUTEROL SULFATE 2.5; .5 MG/3ML; MG/3ML
3 SOLUTION RESPIRATORY (INHALATION) EVERY 6 HOURS PRN
Status: DISCONTINUED | OUTPATIENT
Start: 2024-01-01 | End: 2025-01-01

## 2024-01-01 RX ORDER — ATORVASTATIN CALCIUM 40 MG/1
40 TABLET, FILM COATED ORAL NIGHTLY
Status: DISCONTINUED | OUTPATIENT
Start: 2024-01-01 | End: 2024-01-01

## 2024-01-01 RX ORDER — DOCUSATE SODIUM 100 MG/1
100 CAPSULE, LIQUID FILLED ORAL
Status: DISCONTINUED | OUTPATIENT
Start: 2024-01-01 | End: 2024-01-01

## 2024-01-01 RX ORDER — IPRATROPIUM BROMIDE AND ALBUTEROL SULFATE 2.5; .5 MG/3ML; MG/3ML
3 SOLUTION RESPIRATORY (INHALATION) EVERY 6 HOURS PRN
Status: CANCELLED | OUTPATIENT
Start: 2024-01-01

## 2024-01-01 RX ORDER — HEPARIN SODIUM 5000 [USP'U]/ML
5000 INJECTION, SOLUTION INTRAVENOUS; SUBCUTANEOUS EVERY 12 HOURS SCHEDULED
Status: DISCONTINUED | OUTPATIENT
Start: 2024-01-01 | End: 2024-01-01

## 2024-01-01 RX ORDER — ATROPINE SULFATE 10 MG/ML
2 SOLUTION/ DROPS OPHTHALMIC EVERY 2 HOUR PRN
Status: DISCONTINUED | OUTPATIENT
Start: 2024-01-01 | End: 2025-01-01

## 2024-01-01 RX ORDER — SODIUM CHLORIDE 9 MG/ML
INJECTION, SOLUTION INTRAVENOUS CONTINUOUS
Status: CANCELLED | OUTPATIENT
Start: 2024-01-01

## 2024-01-01 RX ORDER — FUROSEMIDE 10 MG/ML
40 INJECTION INTRAMUSCULAR; INTRAVENOUS EVERY 8 HOURS PRN
Status: DISCONTINUED | OUTPATIENT
Start: 2024-01-01 | End: 2025-01-01

## 2024-01-01 RX ORDER — MORPHINE SULFATE 2 MG/ML
1 INJECTION, SOLUTION INTRAMUSCULAR; INTRAVENOUS
Status: DISCONTINUED | OUTPATIENT
Start: 2024-01-01 | End: 2025-01-01

## 2024-01-01 RX ORDER — GLYCOPYRROLATE 0.2 MG/ML
0.4 INJECTION, SOLUTION INTRAMUSCULAR; INTRAVENOUS
Status: DISCONTINUED | OUTPATIENT
Start: 2024-01-01 | End: 2025-01-01

## 2024-01-01 RX ORDER — ONDANSETRON 2 MG/ML
4 INJECTION INTRAMUSCULAR; INTRAVENOUS EVERY 6 HOURS PRN
Status: DISCONTINUED | OUTPATIENT
Start: 2024-01-01 | End: 2024-01-01

## 2024-01-01 RX ORDER — LORAZEPAM 2 MG/ML
0.5 INJECTION INTRAMUSCULAR EVERY 4 HOURS PRN
Status: DISCONTINUED | OUTPATIENT
Start: 2024-01-01 | End: 2025-01-01

## 2024-12-26 PROBLEM — R53.1 WEAKNESS: Status: ACTIVE | Noted: 2024-01-01

## 2024-12-26 PROBLEM — J11.1 INFLUENZAL BRONCHITIS: Status: ACTIVE | Noted: 2024-01-01

## 2024-12-26 PROBLEM — J10.1 INFLUENZA A: Status: ACTIVE | Noted: 2024-01-01

## 2024-12-26 PROBLEM — N17.9 AKI (ACUTE KIDNEY INJURY) (HCC): Status: ACTIVE | Noted: 2024-01-01

## 2024-12-26 PROBLEM — N17.9 AKI (ACUTE KIDNEY INJURY): Status: ACTIVE | Noted: 2024-01-01

## 2024-12-26 NOTE — ED QUICK NOTES
Pt adamantly refusing straight catheter. States she has had a lot of problems with catheter insertion in the past. Pure ricardo placed.

## 2024-12-26 NOTE — ED QUICK NOTES
Orders for admission, patient is aware of plan and ready to go upstairs. Any questions, please call ED RN Yudelka at extension [phone].     Patient Covid vaccination status: Fully vaccinated     COVID Test Ordered in ED: SARS-CoV-2/Flu A and B/RSV by PCR (GeneXpert)    COVID Suspicion at Admission: N/A    Running Infusions:  0.9NS w/o    Mental Status/LOC at time of transport: A & O x 4     Other pertinent information: [facility]      CIWA score: N/A   NIH score:  N/A

## 2024-12-26 NOTE — ED PROVIDER NOTES
Patient Seen in: [facility] Emergency Department    History     Chief Complaint   Patient presents with    Dehydration    Weakness       HPI    89-year-old female presents to the ED for evaluation of generalized bodyaches, cough, congestion, weakness.  Patient started feeling bad last night but woke up this morning with more discomfort.  She denies any fever or chills.  She states that she has had a wet cough but it is not coming up.  She denies any vomiting or diarrhea.  No abdominal pain.    History from Independent Source: Per EMS, home health aide called EMS for weakness and dehydration that started today.  Home health aide stated that patient was weaker than normal and has had a productive cough    External Records Reviewed: On outpatient records, patient has a BUN and creatinine of 33 and 2.14 from November of this year.  She has a baseline hemoglobin of 9.5 at that time.    History reviewed.   Past Medical History:    Essential hypertension    Hearing impairment    bilat Hearing aids    High blood pressure       History reviewed.   Past Surgical History:   Procedure Laterality Date    Lumpectomy left      Mastectomy right      Mastectomy,simple      Oophorectomy      Thyroidectomy      partial aboy 40y.ago    Total hip replacement Right          Medications :  Prescriptions Prior to Admission[1]     No family history on file.    Smoking Status:   Social History     Socioeconomic History    Marital status:    Tobacco Use    Smoking status: Never    Smokeless tobacco: Never   Vaping Use    Vaping status: Never Used   Substance and Sexual Activity    Alcohol use: No    Drug use: Never       Constitutional and vital signs reviewed.      Social History and Family History elements reviewed from today, pertinent positives to the presenting problem noted.    Physical Exam     ED Triage Vitals [12/26/24 1603]   BP (!) 180/82   Pulse 104   Resp 22   Temp 97.6 °F (36.4 °C)   Temp src Temporal   SpO2 92 %    O2 Device None (Room air)       Physical Exam   Constitutional: AAOx3, well nourished, appears younger than stated age, no acute distress  HEENT: Normocephalic, PERRLA, MMM  CV: s1s2+, RRR, no m/r/g, normal distal pulses  Pulmonary/Chest: Some scattered rhonchi.  No chest wall tenderness  Abdominal: Nontender.  Nondistended. Soft. Bowel sounds are normal.   Neck/Back:   :   Musculoskeletal: Normal range of motion. No deformity.   Neurological: Awake, alert. Normal reflexes. No cranial nerve deficit.    Skin: Skin is warm and dry. No rash noted. No erythema.   Psychiatric:      All measures to prevent infection transmission during my interaction with the patient were taken. The patient was already wearing a droplet mask on my arrival to the room. Personal protective equipment was worn throughout the duration of the exam.      ED Course        Labs Reviewed   CBC WITH DIFFERENTIAL WITH PLATELET - Abnormal; Notable for the following components:       Result Value    RBC 3.33 (*)     HGB 9.8 (*)     HCT 30.2 (*)     RDW-SD 62.9 (*)     RDW 18.8 (*)     Lymphocyte Absolute 0.35 (*)     All other components within normal limits   COMP METABOLIC PANEL (14) - Abnormal; Notable for the following components:    Potassium 5.2 (*)     BUN 37 (*)     Creatinine 2.33 (*)     eGFR-Cr 18 (*)     ALT 9 (*)     All other components within normal limits   SARS-COV-2/FLU A AND B/RSV BY PCR (GENEXPERT) - Abnormal; Notable for the following components:    Influenza A by PCR Positive (*)     All other components within normal limits    Narrative:     This test is intended for the qualitative detection and differentiation of SARS-CoV-2, influenza A, influenza B, and respiratory syncytial virus (RSV) viral RNA in nasopharyngeal or nares swabs from individuals suspected of respiratory viral infection consistent with COVID-19 by their healthcare provider. Signs and symptoms of respiratory viral infection due to SARS-CoV-2, influenza, and  RSV can be similar.                                    Test performed using the Xpert Xpress SARS-CoV-2/FLU/RSV (real time RT-PCR)  assay on the GeneXpert instrument, [employer], [place] [address].                   This test is being used under the Food and Drug Administration's Emergency Use Authorization.                                    The authorized Fact Sheet for Healthcare Providers for this assay is available upon request from the laboratory.   LACTIC ACID, PLASMA - Normal   URINALYSIS WITH CULTURE REFLEX     My Independent Interpretation of EKG (if performed): EKG    Rate, intervals and axes as noted on EKG Report.  Rate: 92 bpm  Rhythm: Atrial flutter  Reading: Atrial flutter with variable block, right bundle branch block, nonspecific ST changes, normal axis             Monitor Interpretation:    Atrial flutter with variable block  as interpreted by me.      Imaging Results Available and Reviewed while in ED: XR CHEST AP PORTABLE  (CPT=71045)    Result Date: 12/26/2024  CONCLUSION:  1. No evidence of pneumonia.    Dictated by (CST): Kenneth Alvarez MD on 12/26/2024 at 4:50 PM     Finalized by (CST): Kenneth Alvarez MD on 12/26/2024 at 4:51 PM         ED Medications Administered:   Medications   sodium chloride 0.9 % IV bolus 1,000 mL (1,000 mL Intravenous New Bag 12/26/24 1643)             MDM     Vitals:    12/26/24 1615 12/26/24 1630 12/26/24 1700 12/26/24 1715   BP: 147/76 149/83 154/73 144/77   Pulse: 71 82 81 75   Resp: 22 22 22 22   Temp:       TempSrc:       SpO2: 92% 92% 99% 95%   Weight:       Height:         *I personally reviewed and interpreted all ED vitals.    Independent Interpretation of Studies: I have independently reviewed chest x-ray and there are no acute findings    Social Determinants of Health:     Procedures:      Differential/MDM/Shared Decision Making: Differential Diagnosis includes COVID, influenza, pneumonia, urine infection, dehydration, electrolyte abnormality, others.       The patient already  has a past medical history of Essential hypertension, Hearing impairment, and High blood pressure.  to contribute to the complexity of this ED evaluation.           Medications, Diagnostics, or Disposition considered but not done:     Patient tested positive for influenza A in the ED.  Management of case was discussed with [place] hospitalist who is excepted patient for admission as patient does have some mild RUBI at this time related to her influenza.  Patient given IV fluid bolus.  Discussed with patient and she is comfortable plan for admission as she lives by herself and would be a significant fall risk given her weakness and dehydration at this time.    Critical care time exclusive of procedure time spent on this patient was 31 min for taking history from patient examining patient, medical decision-making, reviewing lab work and radiology studies, explaining results to patient and family, discussing with consultants/admitting physician, and documenting in patient's chart.      Condition upon leaving the department: Stable    Disposition and Plan     Clinical Impression:  1. Influenza A    2. Weakness    3. RUBI (acute kidney injury) (HCC)        Disposition:  Admit    Follow-up:  No follow-up provider specified.    Medications Prescribed:  Current Discharge Medication List          Hospital Problems       Present on Admission  Date Reviewed: 9/21/2018            ICD-10-CM Noted POA    * (Principal) Influenza A J10.1 12/26/2024 Unknown               [1] (Not in a hospital admission)

## 2024-12-26 NOTE — ED INITIAL ASSESSMENT (HPI)
Pt to ED via EMS from home c/o weakness and \"dehydration\" starting today. Pt is A & O x 4, [place]. Pt told home health aide that she was not feeling well and weaker than normal. +productive cough.

## 2024-12-27 NOTE — DIETARY NOTE
ADULT NUTRITION INITIAL ASSESSMENT    Pt is at moderate nutrition risk.  Pt does not meet malnutrition criteria.      RECOMMENDATIONS TO MD: See nutrition intervention for ONS (oral nutrition supplements)    ADMITTING DIAGNOSIS:  Weakness [R53.1]  Influenza A [J10.1]  RUBI (acute kidney injury) (HCC) [N17.9]  PERTINENT PAST MEDICAL HISTORY:   Past Medical History:    Essential hypertension    Hearing impairment    bilat Hearing aids    High blood pressure     PATIENT STATUS: Initial 12/27/24: Pt admit for weakness, influenza A and RUBI. PMH noted above. Pt assessed due to screening at risk for unintentional weight loss. 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 Chart reviewed, pt admitted from home with c/o weakness and dehydration. Per note review, pt is dependent with most ADL's and has hospital bed/wheelchair at home. Discussion with RN, pt is hard of hearing ([place]) - decreased po reported by son over last 3 weeks. Intakes reviewed, 40% x 1 meal since admission. Pt visited, no family at bedside. Pt reports decreased po intake since Covid last year - things taste the same or bland. Limited diet history able to be obtained at this time. Pt reports weight loss but reports does not weigh self regularly - last known weight ~160# but unsure timeframe (per EMR review, last seen in 2018 and 2022). Current weight 135# 12.8 oz. EMR weight review, last known weight # on 2/8/23. No recent weight history. Pt appears well-nourished. Encouraged adequate energy and protein intake. +ONS to help maximize nutrition. Pt noted with pressure injuries per flowsheet/discussion with RN - no stages, monitor wound consult. Adjusted diet (liberalized Cardiac diet, +low potassium diet given hyperkalemia (5.5)).     FOOD/NUTRITION RELATED HISTORY:  Appetite: Decreased - per RN, son reports decreased po intake x 3  weeks  Intake: ~40% x1 meals documented since admit  Intake Meeting Needs: No, but oral nutrition supplements (ONS) to maximize  Percent Meals Eaten (last 3 days)       Date/Time Percent Meals Eaten (%)    12/27/24 1200 40 %           Food Allergies: No Known Food Allergies (NKFA)  Cultural/Ethnic/Yarsani Preferences: Not Obtained    GASTROINTESTINAL: +BM 12/26/24    MEDICATIONS: reviewed IV Solu-medrol q 12 hours  Electrolyte replacement per protocol (non-cardiac)   oseltamivir  30 mg Oral Daily    amLODIPine  5 mg Oral Daily    atenolol  25 mg Oral Daily    methylPREDNISolone  40 mg Intravenous Q12H    heparin  5,000 Units Subcutaneous 2 times per day     LABS: reviewed Hyperkalemia (5.5) noted -- added low potassium diet   Recent Labs     12/26/24  1609 12/27/24  0516   GLU 70 72   BUN 37* 37*   CREATSERUM 2.33* 2.05*   CA 9.7 9.0    138   K 5.2* 5.5*    108   CO2 25.0 22.0   OSMOCALC 293 293     NUTRITION RELATED PHYSICAL FINDINGS:  - Nutrition Focused Physical Exam (NFPE): well nourished per visual exam  - Fluid Accumulation: +2 Bilateral Feet and +3 Bilateral Lower extremity see RN documentation for details  - Skin Integrity: Pressure Injury: sacrum and bilateral heel - no stage documented in flow sheet and at risk per flowsheet review --> see RN documentation for details    ANTHROPOMETRICS:  HT: 154.9 cm (5' 1\")  WT: 61.6 kg (135 lb 12.8 oz)   BMI: Body mass index is 25.66 kg/m².  BMI CLASSIFICATION: 25-29.9 kg/m2 - overweight  IBW: 105 lbs        129% IBW  Usual Body Wt: 130 lbs February 2023 (limited recent weight history)      104% UBW    WEIGHT HISTORY: EMR review, pt noted weight 150-160# June 2022 and in 2018  Patient Weight(s) for the past 336 hrs:   Weight   12/26/24 2100 61.6 kg (135 lb 12.8 oz)   12/26/24 1603 59 kg (130 lb 1.1 oz)     Wt Readings from Last 10 Encounters:   12/26/24 61.6 kg (135 lb 12.8 oz)   02/08/23 59 kg (130 lb)   09/15/22 59.9 kg (132 lb 0.9 oz)   06/18/22 67.4 kg  (148 lb 11.2 oz)   09/28/18 71 kg (156 lb 8.4 oz)   04/05/17 74.8 kg (165 lb)     NUTRITION DIAGNOSIS/PROBLEM:   Inadequate oral intake related to Decreased ability to consume sufficient energy as evidenced by decreased po intake reported/noted during admission    NUTRITION INTERVENTION:   NUTRITION PRESCRIPTION:   Estimated Nutrition needs: --dosing wt of 61.6 kg - wt taken on 12/26/24  Calories: 6238-3046 calories/day (25-28 calories per kg Dosing wt)  Protein: 62-74 g protein/day (1.0-1.2 g protein/kg Dosing wt)    - Diet:       Procedures    Cardiac diet Cardiac; Is Patient on Accuchecks? No    **Liberalized Cardiac diet to promote increased po    - RD Malnutrition Care Plan: Liberalized diet, Encouraged increased PO intake, and Initiated ONS (oral nutritional supplements)  - Meals and snacks: Liberalized diet and Encouraged increased PO intake  - Medical Food Supplements-RD added Magic Cup (290 calories/ 9 g protein each) Daily Vanilla, Chocolate, or Mixed berry and Mighty Shake (300 calories/ 9 g protein each) Daily Vanilla, Chocolate, or Strawberry. Rational/use of oral supplements discussed.  - Vitamin and mineral supplements: none  - Feeding assistance: meal set up  - Nutrition education: Discussed importance of adequate energy and protein intake     - Coordination of nutrition care: collaboration with other providers  - Discharge and transfer of nutrition care to new setting or provider: monitor plans from home with part-time caregivers    MONITOR AND EVALUATE/NUTRITION GOALS:  - Food and Nutrient Intake:      Monitor: adequacy of PO intake and adequacy of supplement intake  - Food and Nutrient Administration:      Monitor: N/A  - Anthropometric Measurement:    Monitor weight  - Nutrition Goals:      maintain wt within 5%, PO and supplement greater than 75% of needs, good supplement intake, labs within acceptable limits, minimize lean body mass loss, and improved GI status    DIETITIAN FOLLOW UP: RD to  follow and monitor nutrition status    Yodit Padilla MS, MARCO, RDN, LDN  [id number]

## 2024-12-27 NOTE — H&P
[facility]    PATIENT'S NAME: LASHELL IBARRA   ATTENDING PHYSICIAN: Ap Du MD   PATIENT ACCOUNT#:   [id number]    LOCATION:  [place] [address]  MEDICAL RECORD #:   [id number]       YOB: 1925  ADMISSION DATE:       2024    HISTORY AND PHYSICAL EXAMINATION    CHIEF COMPLAINT:  Influenza bronchitis and generalized weakness.    HISTORY OF PRESENT ILLNESS:  The patient is a 89-year-old  female who was brought in today to the emergency department for evaluation of progressive weakness, cough, and shortness of breath with wheezing.  CBC and chemistry unremarkable.  GFR is 18 which is slightly below her baseline.  Lactic acid 0.9.  GeneXpert viral panel positive for influenza.  Chest x-ray showed no acute findings.    PAST MEDICAL HISTORY:  Peripheral arterial disease status post right lower extremity peroneal and popliteal artery angioplasties, generalized osteoarthritis, osteoporosis, musculoskeletal deconditioning, chronic kidney disease stage 4, anemia of chronic kidney disease, degenerative joint disease of lumbar spine, gastroesophageal reflux disease, and hypertension.    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    MEDICATIONS:  Please see medication reconciliation list.     ALLERGIES:  No known drug allergies.    FAMILY HISTORY:  Both parents  of heart disease.    SOCIAL HISTORY:  No tobacco, alcohol, or drug use.  Almost bedbound, requires assistance in her basic activities of daily living.     REVIEW OF SYSTEMS:  Two days of wheezing, cough, shortness of breath.  Cough is productive of clear phlegm.  Body aches and low-grade fever.  Other 12-point review of systems is negative.        PHYSICAL EXAMINATION:    GENERAL:  Alert and oriented to time, place, and person.  Fatigued, no acute distress.  VITAL SIGNS:  Temperature 97.9, pulse 76, respiratory rate 20, blood pressure 147/75, pulse ox 94% on room air.    HEENT:  Atraumatic.  Oropharynx clear.  Dry mucous membranes.  Normal hard and soft palate.  Eyes:  Anicteric sclerae.  NECK:  Supple.  No lymphadenopathy.   LUNGS:  Bilateral rhonchi and wheezing on both lung fields.  HEART:  Regular rate, rhythm.  S1 and S2 auscultated.  No murmur.  ABDOMEN:  Soft, nondistended.  No tenderness.  Positive bowel sounds.    EXTREMITIES:  No edema, clubbing, or cyanosis.  Very superficial pressure ulcers bilateral heels.  NEUROLOGIC:  Motor and sensory intact.     ASSESSMENT:    1.   Influenza bronchitis with bronchospasm.  2.   Generalized weakness.  3.   Chronic kidney disease stage 4.  4.   Essential hypertension.    PLAN:  Patient will be admitted to general medical floor.  Tamiflu adjusted to renal function.  IV Solu-Medrol and nebulizer treatments.  Monitor her respiratory status.  Wound service consult to evaluate her heels.  Further recommendations to follow.    Dictated By Jovanni Braga MD  d: 12/26/2024 18:04:12  t: 12/26/2024 18:41:01  Job [id number]  FB/

## 2024-12-27 NOTE — CM/SW NOTE
12/27/24 1600   CM/SW Referral Data   Referral Source Physician   Reason for Referral Discharge planning   Informant Son  (Dr. Kenneth Wagoner [phone])   Medical Hx   Does patient have an established PCP? Yes  (Home MD through [facility] (affiliated w/ [facility]))   Patient Info   Patient's Current Mental Status at Time of Assessment Alert   Patient's Home Environment House   Patient lives with Other  (Part time caregivers)   Patient Status Prior to Admission   Independent with ADLs and Mobility No   Pt. requires assistance with Housework;Driving;Bathing;Dressing;Medications;Toileting   Services in place prior to admission DME/Supplies at home;Home Health Care;[facility]   Home Health Provider Info [facility] and [facility] ([facility])   [facility] Provider Private Duty   [facility] hours per day Caregiver 10am-430pm, and caregiver comes back for bedtime routine around 10pm. Pt home alone during night, and for few hours after 430pm - bedtime.   [facility] days per week 7   Discharge Needs   Anticipated D/C needs Home health care;Caregiver services;Transportation services   Choice of Post-Acute Provider   Informed patient of right to choose their preferred provider Yes   Patient/family choice [facility], [facility] ([facility])     SW received MDO for SDOH - transportation. SW spoke w/ pt's son via phone, Dr. Kenneth Wagoner for initial assessment and to discuss eventual discharge needs.     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 Pt is from home w/ part time caregivers through [facility] [phone]. Pt has a caregiver from 10-430pm, than pt is home alone for a few hours and bedtime caregiver comes around 10pm to assist pt w/ bedtime routine & than pt is home alone during the night. Pt is dependent w/ most ADL's and has a hospital bed & wheelchair at home.     Pt's son stated that pt has a home MD and home DIONICIO Handy,  who come 1x/month or PRN through [facility] ([facility]) Ph: [phone], fax: [phone]. SW faxed updates to [facility]. Son also reports wound care nurse who comes from [facility]. SW sent updates and DONTAE orders to [facility] via [employer]; Ph: [phone], fax: [phone]. Pt's son stated pt has a short hx of [facility] care in the past, but pt did not need their services at that time.     Son requested [facility] transport at discharge, but expressed understanding of [facility] if more appropriate at the time of discharge. Pt's son stated caregivers typically can get pt into wheelchair, but stated pt is not able to stand/pivot with 1 person assist. SW explained that [facility] will not transport pt if unable to stand/pivot with 1 person assist or if pt needs lift into [facility] - pt likely will need ambulance transport to home at discharge; [address] [place] [address] - address confirmed.     **Son, Dr. Kenneth Wagoner [phone], requested that CM/SW contact son ahead of time nce discharge is known so he can prepare to have caregiver in place at the time of discharge.     PLAN: Home w/ caregivers, [facility] and [facility]    Carly Woo, PARAMJIT - [phone]

## 2024-12-27 NOTE — OCCUPATIONAL THERAPY NOTE
OCCUPATIONAL THERAPY EVALUATION - INPATIENT     Room Number: 515/515-A  Evaluation Date: 2024  Type of Evaluation: Quick Eval  Presenting Problem: influenza, bronchitis, weakness, RUBI  Hx CKD4, HTN, OA, DJD of lumbar spine     Physician Order: IP Consult to Occupational Therapy  Reason for Therapy: ADL/IADL Dysfunction and Discharge Planning    OCCUPATIONAL THERAPY ASSESSMENT   Patient is a 89 year old female admitted 2024 for  influenza, bronchitis, weakness, RUBI. Per chart, patient is dependent with ADLs and mostly bedbound at baseline - she is not able to transfer with 1-person CG assist.  Patient is currently functioning near baseline with  ADLs and fx mobility/transfers . Anticipate patient will be able to safely discharge home with additional support when medically cleared. Would likely benefit from mechanical lift upon discharge per  note.    PLAN  Patient has been evaluated and presents with no skilled Occupational Therapy needs  at this time.  Patient will be discharged from Occupational Therapy services. Please re-order if a new functional limitation presents during this admission.    OCCUPATIONAL THERAPY MEDICAL/SOCIAL HISTORY   Problem List  Principal Problem:    Influenza A  Active Problems:    RUBI (acute kidney injury) (HCC)    Weakness    Influenzal bronchitis    HOME SITUATION  Type of Home: House  Home Layout: One level  Lives With: Alone (CG 7 days/week [id number] and to assist with bedtime routine around 10pm)  Occupation/Status: Retired MD  Drives: No  Patient Regularly Uses: Hearing aides; Wheelchair    SUBJECTIVE  Patient agreeable to OT evaluation.    OCCUPATIONAL THERAPY EXAMINATION    OBJECTIVE  Precautions: Bed/chair alarm; Hard of hearing  Fall Risk: High fall risk    WEIGHT BEARING RESTRICTION  None    PAIN ASSESSMENT  Ratin    ACTIVITY TOLERANCE  Pulse: 86  Heart Rate Source: Monitor                   O2 SATURATIONS  Oxygen Therapy  SPO2% on Room Air at Rest:  96    COGNITION  Following Commands:  follows one step commands with increased time and follows one step commands with repetition    RANGE OF MOTION   Upper extremity ROM is within functional limits     STRENGTH ASSESSMENT  Upper extremity strength is within functional limits     ACTIVITIES OF DAILY LIVING ASSESSMENT  AM-PAC ‘6-Clicks’ Inpatient Daily Activity Short Form  How much help from another person does the patient currently need…  -   Putting on and taking off regular lower body clothing?: A Lot  -   Bathing (including washing, rinsing, drying)?: A Lot  -   Toileting, which includes using toilet, bedpan or urinal? : Total  -   Putting on and taking off regular upper body clothing?: A Lot  -   Taking care of personal grooming such as brushing teeth?: A Lot  -   Eating meals?: A Little    AM-PAC Score:  Score: 12  Approx Degree of Impairment: 66.57%  Standardized Score (AM-PAC Scale): 30.6  CMS Modifier (G-Code): CL    BED MOBILITY  Supine to Sit: max assist  Comments:  Min progressing to CGA for static sitting balance at EOB.    FUNCTIONAL TRANSFER ASSESSMENT  Stand Pivot Transfer from EOB to Chair: max assist without a device    ACTIVITIES OF DAILY LIVING  Eating: min assist (per obs)   Grooming: mod assist (per obs)   UB Dressing: max assist  LB Dressing: total assist  Toileting: total assist    EDUCATION PROVIDED  Patient Education : Plan of Care; Role of Occupational Therapy; Discharge Recommendations; Functional Transfer Techniques; Fall Prevention; Posture/Positioning; Proper Body Mechanics  Patient's Response to Education: Verbalized Understanding    The patient's Approx Degree of Impairment: 66.57% has been calculated based on documentation in the [facility] '6 clicks' Inpatient Daily Activity Short Form.  Research supports that patients with this level of impairment may benefit from rehab.  Final disposition will be made by interdisciplinary medical team.    Patient End of Session: Up in chair;Call light  within reach;Needs met;RN aware of session/findings;All patient questions and concerns addressed;Hospital anti-slip socks;Alarm set    Patient was able to achieve the following ...   Patient able to toilet transfer  at previous functional level    Patient able to dress lower extremities  at previous functional level    Patient/Caregiver able to demonstrate safety with ADLS  at previous functional level     Patient Evaluation Complexity Level:   Occupational Profile/Medical History LOW - Brief history including review of medical or therapy records    Specific performance deficits impacting engagement in ADL/IADL HIGH  5+ performance deficits    Client Assessment/Performance Deficits HIGH - Comorbidities and significant modifications of tasks    Clinical Decision Making LOW - Analysis of occupational profile, problem-focused assessments, limited treatment options    Overall Complexity LOW     OT Session Time  Therapeutic Activity: 10 minutes    SAMI Santos/L  [facility]  #[id number]

## 2024-12-27 NOTE — PHYSICAL THERAPY NOTE
PHYSICAL THERAPY EVALUATION - INPATIENT     Room Number: 515/515-A  Evaluation Date: 12/27/2024  Type of Evaluation: Initial   Physician Order: PT Eval and Treat    Presenting Problem: influenza A, cough, weakness, dehydrations, body aches  Co-Morbidities : Hx CKD4, HTN, OA, DJD of lumbar spine  Reason for Therapy: Mobility Dysfunction and Discharge Planning    PHYSICAL THERAPY ASSESSMENT   Patient is a 89 year old female admitted 12/26/2024 for influenza A, cough, weakness, dehydrations, body aches. Prior to admission, patient's baseline is assist with transfers with transfers to/from wheelchair, primarily bed bound as patient is unable to transfer with 1-person care-giver assistance? Patient requires assistance with all ADLs. Patient is currently functioning near baseline with bed mobility, transfers, and maintaining seated position. Anticipate that patient will be able to discharge home with additional support when medically cleared. Patient may benefit from lorena lift upon return home in order to decrease caregiver burden.     PLAN  Patient has been evaluated and presents with no skilled Physical Therapy needs at this time.  Patient will be discharged from Physical Therapy services. Please re-order if a new functional limitation presents during this admission.    PT Device Recommendation: Wheelchair;Gait belt;Hospital bed;Mechanical lift    PHYSICAL THERAPY MEDICAL/SOCIAL HISTORY     Problem List  Principal Problem:    Influenza A  Active Problems:    RUBI (acute kidney injury) (HCC)    Weakness    Influenzal bronchitis    HOME SITUATION  Type of Home: House  Home Layout: One level  Lives With: Alone (10 am - 430pm, then patient is home alone for a few hours and bedtime caregiver comes around 10 pm to assist patient w/ bedtime routine & then patient is home alone during the night; 7 days/week )    Drives: No   Patient Regularly Uses: Hearing aides;Wheelchair;Hospital bed     SUBJECTIVE  Agreeable     PHYSICAL  THERAPY EXAMINATION   OBJECTIVE  Precautions: Bed/chair alarm;Hard of hearing  Fall Risk: High fall risk    PAIN ASSESSMENT  Rating: Unable to rate  Location: generalized body pains  Management Techniques: Activity promotion;Body mechanics;Repositioning    COGNITION  Following Commands:  follows one step commands with increased time and follows one step commands with repetition    RANGE OF MOTION AND STRENGTH ASSESSMENT  Upper extremity ROM and strength are within functional limits   Lower extremity ROM is within functional limits   Lower extremity strength is impaired, approx 3/5    BALANCE  Static Sitting: Fair -  Dynamic Sitting: Poor +  Static Standing: Poor  Dynamic Standing: Poor -    ACTIVITY TOLERANCE  Pulse: 86  Heart Rate Source: Monitor    O2 WALK  Oxygen Therapy  SPO2% on Room Air at Rest: 96    AM-PAC '6-Clicks' INPATIENT SHORT FORM - BASIC MOBILITY  How much difficulty does the patient currently have...  Patient Difficulty: Turning over in bed (including adjusting bedclothes, sheets and blankets)?: A Lot   Patient Difficulty: Sitting down on and standing up from a chair with arms (e.g., wheelchair, bedside commode, etc.): A Lot   Patient Difficulty: Moving from lying on back to sitting on the side of the bed?: A Lot   How much help from another person does the patient currently need...   Help from Another: Moving to and from a bed to a chair (including a wheelchair)?: A Lot   Help from Another: Need to walk in hospital room?: Total   Help from Another: Climbing 3-5 steps with a railing?: Total     AM-PAC Score:  Raw Score: 10   Approx Degree of Impairment: 76.75%   Standardized Score (AM-PAC Scale): 32.29   CMS Modifier (G-Code): CL    FUNCTIONAL ABILITY STATUS  Functional Mobility/Gait Assessment  Gait Assistance: Not tested  Supine to Sit: maximum assist. Patient tolerated static sitting approx 5 minutes with BUE support and Min A progressing to CGA in order to maintain static sitting balance.  SPT  EOB>bedside chair: maximum assist with gait belt     Exercise/Education Provided:  Bed mobility  Body mechanics  Energy conservation  Functional activity tolerated  Posture    Skilled Therapy Provided: Patient in bed upon arrival. RN approved activity. Educated patient on POC and benefits of mobilization. Agreeable to participate. Patient reporting generalized body pains, not quantified per the pain scale. Patient benefits from increased time, cues, and assistance in order to complete functional mobility tasks.     The patient's Approx Degree of Impairment: 76.75% has been calculated based on documentation in the [facility] '6 clicks' Inpatient Basic Mobility Short Form.  Research supports that patients with this level of impairment may benefit from LTAC, however, patient is nonambulatory at baseline. Anticipate that patient will be able to return home with additional support upon discharge.  Final disposition will be made by interdisciplinary medical team.    Patient End of Session: Up in chair;Needs met;Call light within reach;RN aware of session/findings;All patient questions and concerns addressed;Hospital anti-slip socks;Alarm set    Patient was able to achieve the following ...   Patient able to transfer  At previous, functional level  Requires assistance at baseline for SPT    Patient able to ambulate on level surfaces  Unable before admission     Patient Evaluation Complexity Level:  History Moderate - 1 or 2 personal factors and/or co-morbidities   Examination of body systems Low -  addressing 1-2 elements   Clinical Presentation Low- Stable   Clinical Decision Making  Low Complexity     Therapeutic Activity:  10 minutes

## 2024-12-27 NOTE — WOUND PROGRESS NOTE
Re-Attempted to see pt who has been refusing assessment for heel eval, asked the RN to report if a wound consult is required. RN is at lunch. Will see pt as schedule permits.

## 2024-12-27 NOTE — WOUND PROGRESS NOTE
Spoke to bedside RN who advised pt is refusing aspects of care. RN to advised when pt can be assessed for heel and sacral wounds which were reported.    No

## 2024-12-27 NOTE — PROGRESS NOTES
[facility]  part of [facility]    Progress Note    Amy Wagoner Patient Status:  Inpatient    1925 MRN [id number]   Location [facility]5W Attending Jane Montague DO   Hosp Day # 1 PCP PHYSICIAN NONSTAFF     Chief complaint flu    Subjective:   Amy Wagoner is a(n) 89 year old female pt with cough    ROS:   No cp, sob   No c/d   No n/v     Objective:   Blood pressure 141/56, pulse 88, temperature 97.5 °F (36.4 °C), temperature source Axillary, resp. rate 20, height 5' 1\" (1.549 m), weight 135 lb 12.8 oz (61.6 kg), SpO2 95%.      Intake/Output Summary (Last 24 hours) at 2024 1658  Last data filed at 2024 1200  Gross per 24 hour   Intake 1100 ml   Output 350 ml   Net 750 ml       Patient Weight(s) for the past 336 hrs:   Weight   24 2100 135 lb 12.8 oz (61.6 kg)   24 1603 130 lb 1.1 oz (59 kg)           General appearance: alert, appears stated age and cooperative  Pulmonary:  wheezing  Cardiovascular: S1, S2 normal, no murmur, click, rub or gallop, regular rate and rhythm  Abdominal: soft, non-tender; bowel sounds normal; no masses,  no organomegaly  Extremities: extremities normal, atraumatic, no cyanosis or edema        Medicines:     Current Facility-Administered Medications   Medication Dose Route Frequency    oseltamivir (Tamiflu) cap 30 mg  30 mg Oral Daily    amLODIPine (Norvasc) tab 5 mg  5 mg Oral Daily    atenolol (Tenormin) tab 25 mg  25 mg Oral Daily    docusate sodium (Colace) cap 100 mg  100 mg Oral Daily PRN    methylPREDNISolone sodium succinate (Solu-MEDROL) injection 40 mg  40 mg Intravenous Q12H    heparin (Porcine) 5000 UNIT/ML injection 5,000 Units  5,000 Units Subcutaneous 2 times per day    acetaminophen (Tylenol Extra Strength) tab 500 mg  500 mg Oral Q4H PRN    ondansetron (Zofran) 4 MG/2ML injection 4 mg  4 mg Intravenous Q6H PRN    metoclopramide (Reglan) 5 mg/mL injection 10 mg  10 mg Intravenous Q8H PRN     ipratropium-albuterol (Duoneb) 0.5-2.5 (3) MG/3ML inhalation solution 3 mL  3 mL Nebulization Q6H PRN                   Blood Pressure and Cardiac Medications            amLODIPine 5 MG Oral Tab    atenolol 25 MG Oral Tab                    Lab Results   Component Value Date    WBC 3.8 (L) 12/27/2024    HGB 9.4 (L) 12/27/2024    HCT 29.7 (L) 12/27/2024    .0 12/27/2024    CREATSERUM 2.05 (H) 12/27/2024    BUN 37 (H) 12/27/2024     12/27/2024    K 5.5 (H) 12/27/2024     12/27/2024    CO2 22.0 12/27/2024    GLU 72 12/27/2024    CA 9.0 12/27/2024    ALB 4.0 12/26/2024    ALKPHO 135 12/26/2024    BILT 0.2 12/26/2024    TP 7.5 12/26/2024    AST 21 12/26/2024    ALT 9 (L) 12/26/2024    INR 0.93 09/23/2018    T4F 0.8 12/27/2024    TSH 8.500 (H) 12/27/2024    LIP 71 (L) 06/10/2022     (H) 09/22/2018    ESRML 69 (H) 09/12/2022    MG 1.9 06/17/2022    PHOS 2.7 06/17/2022    B12 807 09/20/2018       XR CHEST AP PORTABLE  (CPT=71045)    Result Date: 12/26/2024  CONCLUSION:  1. No evidence of pneumonia.    Dictated by (CST): Kenneth Alvarez MD on 12/26/2024 at 4:50 PM     Finalized by (CST): Kenneth Alvarez MD on 12/26/2024 at 4:51 PM         EKG 12 Lead    Result Date: 12/26/2024  Atrial flutter with variable A-V block Right bundle branch block T wave abnormality, consider inferior ischemia Abnormal ECG When compared with ECG of 20-SEP-2018 14:43, Atrial flutter has replaced Sinus rhythm Vent. rate has increased BY  32 BPM Right bundle branch block is now Present Confirmed by DO Redmond Asif ([id number]) on 12/26/2024 5:03:31 PM     Results:     CBC:    Lab Results   Component Value Date    WBC 3.8 (L) 12/27/2024    WBC 4.9 12/26/2024    WBC 7.3 02/12/2023     Lab Results   Component Value Date    HGB 9.4 (L) 12/27/2024    HGB 9.8 (L) 12/26/2024    HGB 10.7 (L) 02/12/2023      Lab Results   Component Value Date    .0 12/27/2024    .0 12/26/2024    .0 02/12/2023       Recent Labs   Lab  12/26/24  1609 12/27/24  0516   GLU 70 72   BUN 37* 37*   CREATSERUM 2.33* 2.05*   CA 9.7 9.0    138   K 5.2* 5.5*    108   CO2 25.0 22.0           Assessment and Plan:      ASSESSMENT:    1.       Influenza bronchitis with bronchospasm.  2.       Generalized weakness.  3.       Chronic kidney disease stage 4.  4.       Essential hypertension.     PLAN:  Patient will be admitted to general medical floor.  Tamiflu adjusted to renal function.  IV Solu-Medrol and nebulizer treatments.  Monitor her respiratory status.  Wound service consult to evaluate her heels.  Further recommendations to follow.               Jane Montague DO         Chart reviewed, including current vitals, notes, labs and imaging  Labs ordered and medications adjusted as outlined above  Coordinate care with care team/consultants  Discussed with patient results of tests, management plan as outlined above, and the need for ongoing hospitalization  D/w RN     [facility]        12/27/2024     **Certification      PHYSICIAN Certification of Need for Inpatient Hospitalization - Initial Certification    Patient will require inpatient services that will reasonably be expected to span two midnight's based on the clinical documentation in H+P.   Based on patients current state of illness, I anticipate that, after discharge, patient will require TBD.        Supplementary Documentation:   DVT Mechanical Prophylaxis:        DVT Pharmacologic Prophylaxis   Medication    heparin (Porcine) 5000 UNIT/ML injection 5,000 Units                Code Status: DNAR/Selective Treatment  Foster: External urinary catheter in place  Foster Duration (in days):   Central line:    POLY:         **Certification      PHYSICIAN Certification of Need for Inpatient Hospitalization - Initial Certification    Patient will require inpatient services that will reasonably be expected to span two midnight's based on the clinical documentation in H+P.   Based on patients current  state of illness, I anticipate that, after discharge, patient will require TBD.

## 2024-12-27 NOTE — PLAN OF CARE
Problem: Patient Centered Care  Goal: Patient preferences are identified and integrated in the patient's plan of care  Description: Interventions:  - What would you like us to know as we care for you? From home alone   - Provide timely, complete, and accurate information to patient/family  - Incorporate patient and family knowledge, values, beliefs, and cultural backgrounds into the planning and delivery of care  - Encourage patient/family to participate in care and decision-making at the level they choose  - Honor patient and family perspectives and choices  Outcome: Progressing     Problem: Patient/Family Goals  Goal: Patient/Family Long Term Goal  Description: Patient's Long Term Goal: to return to baseline    Interventions:  - Monitor vital signs  - Monitor appropriate labs   - Pain management   - Administer medications per order   - Follow MD orders   - IV steroids  - Diagnostics per order   - Update/inform patient and family on plan of care   - Discharge planning   - See additional Care Plan goals for specific interventions  Outcome: Progressing  Goal: Patient/Family Short Term Goal  Description: Patient's Short Term Goal: to feel better    Interventions:   - Monitor vital signs  - Monitor appropriate labs   - Pain management   - Administer medications per order   - Follow MD orders   - IV steroids  - Diagnostics per order   - Update/inform patient and family on plan of care   - Discharge planning   - See additional Care Plan goals for specific interventions  Outcome: Progressing     Problem: PAIN - ADULT  Goal: Verbalizes/displays adequate comfort level or patient's stated pain goal  Description: INTERVENTIONS:  - Encourage pt to monitor pain and request assistance  - Assess pain using appropriate pain scale  - Administer analgesics based on type and severity of pain and evaluate response  - Implement non-pharmacological measures as appropriate and evaluate response  - Consider cultural and social  influences on pain and pain management  - Manage/alleviate anxiety  - Utilize distraction and/or relaxation techniques  - Monitor for opioid side effects  - Notify MD/LIP if interventions unsuccessful or patient reports new pain  - Anticipate increased pain with activity and pre-medicate as appropriate  Outcome: Progressing     Problem: RISK FOR INFECTION - ADULT  Goal: Absence of fever/infection during anticipated neutropenic period  Description: INTERVENTIONS  - Monitor WBC  - Administer growth factors as ordered  - Implement neutropenic guidelines  Outcome: Progressing     Problem: SAFETY ADULT - FALL  Goal: Free from fall injury  Description: INTERVENTIONS:  - Assess pt frequently for physical needs  - Identify cognitive and physical deficits and behaviors that affect risk of falls.  - [place] fall precautions as indicated by assessment.  - Educate pt/family on patient safety including physical limitations  - Instruct pt to call for assistance with activity based on assessment  - Modify environment to reduce risk of injury  - Provide assistive devices as appropriate  - Consider OT/PT consult to assist with strengthening/mobility  - Encourage toileting schedule  Outcome: Progressing     Problem: CARDIOVASCULAR - ADULT  Goal: Maintains optimal cardiac output and hemodynamic stability  Description: INTERVENTIONS:  - Monitor vital signs, rhythm, and trends  - Monitor for bleeding, hypotension and signs of decreased cardiac output  - Evaluate effectiveness of vasoactive medications to optimize hemodynamic stability  - Monitor arterial and/or venous puncture sites for bleeding and/or hematoma  - Assess quality of pulses, skin color and temperature  - Assess for signs of decreased coronary artery perfusion - ex. Angina  - Evaluate fluid balance, assess for edema, trend weights  Outcome: Progressing  Goal: Absence of cardiac arrhythmias or at baseline  Description: INTERVENTIONS:  - Continuous cardiac monitoring,  monitor vital signs, obtain 12 lead EKG if indicated  - Evaluate effectiveness of antiarrhythmic and heart rate control medications as ordered  - Initiate emergency measures for life threatening arrhythmias  - Monitor electrolytes and administer replacement therapy as ordered  Outcome: Progressing     Problem: RESPIRATORY - ADULT  Goal: Achieves optimal ventilation and oxygenation  Description: INTERVENTIONS:  - Assess for changes in respiratory status  - Assess for changes in mentation and behavior  - Position to facilitate oxygenation and minimize respiratory effort  - Oxygen supplementation based on oxygen saturation or ABGs  - Provide Smoking Cessation handout, if applicable  - Encourage broncho-pulmonary hygiene including cough, deep breathe, Incentive Spirometry  - Assess the need for suctioning and perform as needed  - Assess and instruct to report SOB or any respiratory difficulty  - Respiratory Therapy support as indicated  - Manage/alleviate anxiety  - Monitor for signs/symptoms of CO2 retention  Outcome: Progressing     Problem: SKIN/TISSUE INTEGRITY - ADULT  Goal: Skin integrity remains intact  Description: INTERVENTIONS  - Assess and document risk factors for pressure ulcer development  - Assess and document skin integrity  - Monitor for areas of redness and/or skin breakdown  - Initiate interventions, skin care algorithm/standards of care as needed  Outcome: Progressing  Goal: Incision(s), wounds(s) or drain site(s) healing without S/S of infection  Description: INTERVENTIONS:  - Assess and document risk factors for pressure ulcer development  - Assess and document skin integrity  - Assess and document dressing/incision, wound bed, drain sites and surrounding tissue  - Implement wound care per orders  - Initiate isolation precautions as appropriate  - Initiate Pressure Ulcer prevention bundle as indicated  Outcome: Progressing     Problem: HEMATOLOGIC - ADULT  Goal: Maintains hematologic  stability  Description: INTERVENTIONS  - Assess for signs and symptoms of bleeding or hemorrhage  - Monitor labs and vital signs for trends  - Administer supportive blood products/factors, fluids and medications as ordered and appropriate  - Administer supportive blood products/factors as ordered and appropriate  Outcome: Progressing  Goal: Free from bleeding injury  Description: (Example usage: patient with low platelets)  INTERVENTIONS:  - Avoid intramuscular injections, enemas and rectal medication administration  - Ensure safe mobilization of patient  - Hold pressure on venipuncture sites to achieve adequate hemostasis  - Assess for signs and symptoms of internal bleeding  - Monitor lab trends  - Patient is to report abnormal signs of bleeding to staff  - Avoid use of toothpicks and dental floss  - Use electric shaver for shaving  - Use soft bristle tooth brush  - Limit straining and forceful nose blowing  Outcome: Progressing     Problem: MUSCULOSKELETAL - ADULT  Goal: Return mobility to safest level of function  Description: INTERVENTIONS:  - Assess patient stability and activity tolerance for standing, transferring and ambulating w/ or w/o assistive devices  - Assist with transfers and ambulation using safe patient handling equipment as needed  - Ensure adequate protection for wounds/incisions during mobilization  - Obtain PT/OT consults as needed  - Advance activity as appropriate  - Communicate ordered activity level and limitations with patient/family  Outcome: Progressing

## 2024-12-28 NOTE — PLAN OF CARE
Pt cooperative, calm, refusing heparin injections. Pt is very hard of hearing. AM labs, plan to be determined.       Problem: Patient Centered Care  Goal: Patient preferences are identified and integrated in the patient's plan of care  Description: Interventions:  - What would you like us to know as we care for you? From home alone   - Provide timely, complete, and accurate information to patient/family  - Incorporate patient and family knowledge, values, beliefs, and cultural backgrounds into the planning and delivery of care  - Encourage patient/family to participate in care and decision-making at the level they choose  - Honor patient and family perspectives and choices  Outcome: Progressing     Problem: Patient/Family Goals  Goal: Patient/Family Long Term Goal  Description: Patient's Long Term Goal: to return to baseline    Interventions:  - Monitor vital signs  - Monitor appropriate labs   - Pain management   - Administer medications per order   - Follow MD orders   - IV steroids  - Diagnostics per order   - Update/inform patient and family on plan of care   - Discharge planning   - See additional Care Plan goals for specific interventions  Outcome: Progressing  Goal: Patient/Family Short Term Goal  Description: Patient's Short Term Goal: to feel better    Interventions:   - Monitor vital signs  - Monitor appropriate labs   - Pain management   - Administer medications per order   - Follow MD orders   - IV steroids  - Diagnostics per order   - Update/inform patient and family on plan of care   - Discharge planning   - See additional Care Plan goals for specific interventions  Outcome: Progressing     Problem: PAIN - ADULT  Goal: Verbalizes/displays adequate comfort level or patient's stated pain goal  Description: INTERVENTIONS:  - Encourage pt to monitor pain and request assistance  - Assess pain using appropriate pain scale  - Administer analgesics based on type and severity of pain and evaluate response  -  Implement non-pharmacological measures as appropriate and evaluate response  - Consider cultural and social influences on pain and pain management  - Manage/alleviate anxiety  - Utilize distraction and/or relaxation techniques  - Monitor for opioid side effects  - Notify MD/LIP if interventions unsuccessful or patient reports new pain  - Anticipate increased pain with activity and pre-medicate as appropriate  Outcome: Progressing     Problem: RISK FOR INFECTION - ADULT  Goal: Absence of fever/infection during anticipated neutropenic period  Description: INTERVENTIONS  - Monitor WBC  - Administer growth factors as ordered  - Implement neutropenic guidelines  Outcome: Progressing     Problem: SAFETY ADULT - FALL  Goal: Free from fall injury  Description: INTERVENTIONS:  - Assess pt frequently for physical needs  - Identify cognitive and physical deficits and behaviors that affect risk of falls.  - [place] fall precautions as indicated by assessment.  - Educate pt/family on patient safety including physical limitations  - Instruct pt to call for assistance with activity based on assessment  - Modify environment to reduce risk of injury  - Provide assistive devices as appropriate  - Consider OT/PT consult to assist with strengthening/mobility  - Encourage toileting schedule  Outcome: Progressing     Problem: CARDIOVASCULAR - ADULT  Goal: Maintains optimal cardiac output and hemodynamic stability  Description: INTERVENTIONS:  - Monitor vital signs, rhythm, and trends  - Monitor for bleeding, hypotension and signs of decreased cardiac output  - Evaluate effectiveness of vasoactive medications to optimize hemodynamic stability  - Monitor arterial and/or venous puncture sites for bleeding and/or hematoma  - Assess quality of pulses, skin color and temperature  - Assess for signs of decreased coronary artery perfusion - ex. Angina  - Evaluate fluid balance, assess for edema, trend weights  Outcome: Progressing  Goal:  Absence of cardiac arrhythmias or at baseline  Description: INTERVENTIONS:  - Continuous cardiac monitoring, monitor vital signs, obtain 12 lead EKG if indicated  - Evaluate effectiveness of antiarrhythmic and heart rate control medications as ordered  - Initiate emergency measures for life threatening arrhythmias  - Monitor electrolytes and administer replacement therapy as ordered  Outcome: Progressing     Problem: RESPIRATORY - ADULT  Goal: Achieves optimal ventilation and oxygenation  Description: INTERVENTIONS:  - Assess for changes in respiratory status  - Assess for changes in mentation and behavior  - Position to facilitate oxygenation and minimize respiratory effort  - Oxygen supplementation based on oxygen saturation or ABGs  - Provide Smoking Cessation handout, if applicable  - Encourage broncho-pulmonary hygiene including cough, deep breathe, Incentive Spirometry  - Assess the need for suctioning and perform as needed  - Assess and instruct to report SOB or any respiratory difficulty  - Respiratory Therapy support as indicated  - Manage/alleviate anxiety  - Monitor for signs/symptoms of CO2 retention  Outcome: Progressing     Problem: SKIN/TISSUE INTEGRITY - ADULT  Goal: Skin integrity remains intact  Description: INTERVENTIONS  - Assess and document risk factors for pressure ulcer development  - Assess and document skin integrity  - Monitor for areas of redness and/or skin breakdown  - Initiate interventions, skin care algorithm/standards of care as needed  Outcome: Progressing  Goal: Incision(s), wounds(s) or drain site(s) healing without S/S of infection  Description: INTERVENTIONS:  - Assess and document risk factors for pressure ulcer development  - Assess and document skin integrity  - Assess and document dressing/incision, wound bed, drain sites and surrounding tissue  - Implement wound care per orders  - Initiate isolation precautions as appropriate  - Initiate Pressure Ulcer prevention bundle as  indicated  Outcome: Progressing     Problem: HEMATOLOGIC - ADULT  Goal: Maintains hematologic stability  Description: INTERVENTIONS  - Assess for signs and symptoms of bleeding or hemorrhage  - Monitor labs and vital signs for trends  - Administer supportive blood products/factors, fluids and medications as ordered and appropriate  - Administer supportive blood products/factors as ordered and appropriate  Outcome: Progressing  Goal: Free from bleeding injury  Description: (Example usage: patient with low platelets)  INTERVENTIONS:  - Avoid intramuscular injections, enemas and rectal medication administration  - Ensure safe mobilization of patient  - Hold pressure on venipuncture sites to achieve adequate hemostasis  - Assess for signs and symptoms of internal bleeding  - Monitor lab trends  - Patient is to report abnormal signs of bleeding to staff  - Avoid use of toothpicks and dental floss  - Use electric shaver for shaving  - Use soft bristle tooth brush  - Limit straining and forceful nose blowing  Outcome: Progressing     Problem: MUSCULOSKELETAL - ADULT  Goal: Return mobility to safest level of function  Description: INTERVENTIONS:  - Assess patient stability and activity tolerance for standing, transferring and ambulating w/ or w/o assistive devices  - Assist with transfers and ambulation using safe patient handling equipment as needed  - Ensure adequate protection for wounds/incisions during mobilization  - Obtain PT/OT consults as needed  - Advance activity as appropriate  - Communicate ordered activity level and limitations with patient/family  Outcome: Progressing

## 2024-12-28 NOTE — PROGRESS NOTES
[facility]  part of [facility]    Progress Note    Amy Wagoner Patient Status:  Inpatient    1925 MRN [id number]   Location [facility], [place]5W Attending Jane Montague DO   Hosp Day # 2 PCP PHYSICIAN NONSTAFF     Chief complaint flu    Subjective:   Amy Wagoner is a(n) 89 year old female pt with cough. Better today. Some upper airway congestion    ROS:   No cp, sob   No c/d   No n/v     Objective:   Blood pressure 138/66, pulse 72, temperature 97.4 °F (36.3 °C), temperature source Axillary, resp. rate 16, height 5' 1\" (1.549 m), weight 135 lb 12.8 oz (61.6 kg), SpO2 93%.      Intake/Output Summary (Last 24 hours) at 2024 1114  Last data filed at 2024 0930  Gross per 24 hour   Intake 710 ml   Output 400 ml   Net 310 ml       Patient Weight(s) for the past 336 hrs:   Weight   24 2100 135 lb 12.8 oz (61.6 kg)   24 1603 130 lb 1.1 oz (59 kg)           General appearance: alert, appears stated age and cooperative  Pulmonary: no wheezing today, upper airway congestion   Cardiovascular: S1, S2 normal, no murmur, click, rub or gallop, regular rate and rhythm  Abdominal: soft, non-tender; bowel sounds normal; no masses,  no organomegaly  Extremities: extremities normal, atraumatic, no cyanosis or edema        Medicines:     Current Facility-Administered Medications   Medication Dose Route Frequency    oseltamivir (Tamiflu) cap 30 mg  30 mg Oral Daily    amLODIPine (Norvasc) tab 5 mg  5 mg Oral Daily    atenolol (Tenormin) tab 25 mg  25 mg Oral Daily    docusate sodium (Colace) cap 100 mg  100 mg Oral Daily PRN    methylPREDNISolone sodium succinate (Solu-MEDROL) injection 40 mg  40 mg Intravenous Q12H    heparin (Porcine) 5000 UNIT/ML injection 5,000 Units  5,000 Units Subcutaneous 2 times per day    acetaminophen (Tylenol Extra Strength) tab 500 mg  500 mg Oral Q4H PRN    ondansetron (Zofran) 4 MG/2ML injection 4 mg  4 mg Intravenous Q6H PRN    metoclopramide  (Reglan) 5 mg/mL injection 10 mg  10 mg Intravenous Q8H PRN    ipratropium-albuterol (Duoneb) 0.5-2.5 (3) MG/3ML inhalation solution 3 mL  3 mL Nebulization Q6H PRN                   Blood Pressure and Cardiac Medications            amLODIPine 5 MG Oral Tab    atenolol 25 MG Oral Tab                    Lab Results   Component Value Date    WBC 5.3 12/28/2024    HGB 8.6 (L) 12/28/2024    HCT 25.5 (L) 12/28/2024    .0 12/28/2024    CREATSERUM 2.20 (H) 12/28/2024    BUN 52 (H) 12/28/2024     12/28/2024    K 4.8 12/28/2024     12/28/2024    CO2 21.0 12/28/2024     (H) 12/28/2024    CA 8.8 12/28/2024    ALB 4.0 12/26/2024    ALKPHO 135 12/26/2024    BILT 0.2 12/26/2024    TP 7.5 12/26/2024    AST 21 12/26/2024    ALT 9 (L) 12/26/2024    INR 0.93 09/23/2018    T4F 0.8 12/27/2024    TSH 8.500 (H) 12/27/2024    LIP 71 (L) 06/10/2022     (H) 09/22/2018    ESRML 69 (H) 09/12/2022    MG 1.9 06/17/2022    PHOS 2.7 06/17/2022    B12 807 09/20/2018       XR CHEST AP PORTABLE  (CPT=71045)    Result Date: 12/26/2024  CONCLUSION:  1. No evidence of pneumonia.    Dictated by (CST): Kenneth Alvarez MD on 12/26/2024 at 4:50 PM     Finalized by (CST): Kenneth Alvarez MD on 12/26/2024 at 4:51 PM         EKG 12 Lead    Result Date: 12/26/2024  Atrial flutter with variable A-V block Right bundle branch block T wave abnormality, consider inferior ischemia Abnormal ECG When compared with ECG of 20-SEP-2018 14:43, Atrial flutter has replaced Sinus rhythm Vent. rate has increased BY  32 BPM Right bundle branch block is now Present Confirmed by DO Redmond Asif ([id number]) on 12/26/2024 5:03:31 PM     Results:     CBC:    Lab Results   Component Value Date    WBC 5.3 12/28/2024    WBC 3.8 (L) 12/27/2024    WBC 4.9 12/26/2024     Lab Results   Component Value Date    HGB 8.6 (L) 12/28/2024    HGB 9.4 (L) 12/27/2024    HGB 9.8 (L) 12/26/2024      Lab Results   Component Value Date    .0 12/28/2024    .0  12/27/2024    .0 12/26/2024       Recent Labs   Lab 12/26/24  1609 12/27/24  0516 12/28/24  0503   GLU 70 72 114*   BUN 37* 37* 52*   CREATSERUM 2.33* 2.05* 2.20*   CA 9.7 9.0 8.8    138 139   K 5.2* 5.5* 4.8    108 108   CO2 25.0 22.0 21.0           Assessment and Plan:      ASSESSMENT:    1.       Influenza bronchitis with bronchospasm.  - wheezing better - cont tamiflu and steroids   - cont nebs but no wheezing today   - cxr neg for pna   - wean one liter of oxygen if able     2.       Generalized weakness.  - pt/ot when improved     3.       Chronic kidney disease stage 4.  - creat near baseline. Bun elevated due to steroids     4.       Essential hypertension.  5. Heel pain - wound care to see for pressure ulcers noted on admit             Jane Montague DO         Chart reviewed, including current vitals, notes, labs and imaging  Labs ordered and medications adjusted as outlined above  Coordinate care with care team/consultants  Discussed with patient results of tests, management plan as outlined above, and the need for ongoing hospitalization  D/w RN     [facility]        12/27/2024     **Certification      PHYSICIAN Certification of Need for Inpatient Hospitalization - Initial Certification    Patient will require inpatient services that will reasonably be expected to span two midnight's based on the clinical documentation in H+P.   Based on patients current state of illness, I anticipate that, after discharge, patient will require TBD.        Supplementary Documentation:   DVT Mechanical Prophylaxis:        DVT Pharmacologic Prophylaxis   Medication    heparin (Porcine) 5000 UNIT/ML injection 5,000 Units                Code Status: DNAR/Selective Treatment  Foster: External urinary catheter in place  Foster Duration (in days):   Central line:    POLY:         **Certification      PHYSICIAN Certification of Need for Inpatient Hospitalization - Initial Certification    Patient will require  inpatient services that will reasonably be expected to span two midnight's based on the clinical documentation in H+P.   Based on patients current state of illness, I anticipate that, after discharge, patient will require TBD.

## 2024-12-28 NOTE — PLAN OF CARE
The patient is A/0x4, the patient prefers to have prior notice of all labs and blood draws, IV steroids, Room air, dietary on consult, call light in place     Problem: Patient Centered Care  Goal: Patient preferences are identified and integrated in the patient's plan of care  Description: Interventions:  - What would you like us to know as we care for you? From home alone   - Provide timely, complete, and accurate information to patient/family  - Incorporate patient and family knowledge, values, beliefs, and cultural backgrounds into the planning and delivery of care  - Encourage patient/family to participate in care and decision-making at the level they choose  - Honor patient and family perspectives and choices  Outcome: Progressing     Problem: Patient/Family Goals  Goal: Patient/Family Long Term Goal  Description: Patient's Long Term Goal: to return to baseline    Interventions:  - Monitor vital signs  - Monitor appropriate labs   - Pain management   - Administer medications per order   - Follow MD orders   - IV steroids  - Diagnostics per order   - Update/inform patient and family on plan of care   - Discharge planning   - See additional Care Plan goals for specific interventions  Outcome: Progressing  Goal: Patient/Family Short Term Goal  Description: Patient's Short Term Goal: to feel better    Interventions:   - Monitor vital signs  - Monitor appropriate labs   - Pain management   - Administer medications per order   - Follow MD orders   - IV steroids  - Diagnostics per order   - Update/inform patient and family on plan of care   - Discharge planning   - See additional Care Plan goals for specific interventions  Outcome: Progressing     Problem: PAIN - ADULT  Goal: Verbalizes/displays adequate comfort level or patient's stated pain goal  Description: INTERVENTIONS:  - Encourage pt to monitor pain and request assistance  - Assess pain using appropriate pain scale  - Administer analgesics based on type and  severity of pain and evaluate response  - Implement non-pharmacological measures as appropriate and evaluate response  - Consider cultural and social influences on pain and pain management  - Manage/alleviate anxiety  - Utilize distraction and/or relaxation techniques  - Monitor for opioid side effects  - Notify MD/LIP if interventions unsuccessful or patient reports new pain  - Anticipate increased pain with activity and pre-medicate as appropriate  Outcome: Progressing     Problem: RISK FOR INFECTION - ADULT  Goal: Absence of fever/infection during anticipated neutropenic period  Description: INTERVENTIONS  - Monitor WBC  - Administer growth factors as ordered  - Implement neutropenic guidelines  Outcome: Progressing     Problem: SAFETY ADULT - FALL  Goal: Free from fall injury  Description: INTERVENTIONS:  - Assess pt frequently for physical needs  - Identify cognitive and physical deficits and behaviors that affect risk of falls.  - [place] fall precautions as indicated by assessment.  - Educate pt/family on patient safety including physical limitations  - Instruct pt to call for assistance with activity based on assessment  - Modify environment to reduce risk of injury  - Provide assistive devices as appropriate  - Consider OT/PT consult to assist with strengthening/mobility  - Encourage toileting schedule  Outcome: Progressing     Problem: CARDIOVASCULAR - ADULT  Goal: Maintains optimal cardiac output and hemodynamic stability  Description: INTERVENTIONS:  - Monitor vital signs, rhythm, and trends  - Monitor for bleeding, hypotension and signs of decreased cardiac output  - Evaluate effectiveness of vasoactive medications to optimize hemodynamic stability  - Monitor arterial and/or venous puncture sites for bleeding and/or hematoma  - Assess quality of pulses, skin color and temperature  - Assess for signs of decreased coronary artery perfusion - ex. Angina  - Evaluate fluid balance, assess for edema, trend  weights  Outcome: Progressing  Goal: Absence of cardiac arrhythmias or at baseline  Description: INTERVENTIONS:  - Continuous cardiac monitoring, monitor vital signs, obtain 12 lead EKG if indicated  - Evaluate effectiveness of antiarrhythmic and heart rate control medications as ordered  - Initiate emergency measures for life threatening arrhythmias  - Monitor electrolytes and administer replacement therapy as ordered  Outcome: Progressing     Problem: RESPIRATORY - ADULT  Goal: Achieves optimal ventilation and oxygenation  Description: INTERVENTIONS:  - Assess for changes in respiratory status  - Assess for changes in mentation and behavior  - Position to facilitate oxygenation and minimize respiratory effort  - Oxygen supplementation based on oxygen saturation or ABGs  - Provide Smoking Cessation handout, if applicable  - Encourage broncho-pulmonary hygiene including cough, deep breathe, Incentive Spirometry  - Assess the need for suctioning and perform as needed  - Assess and instruct to report SOB or any respiratory difficulty  - Respiratory Therapy support as indicated  - Manage/alleviate anxiety  - Monitor for signs/symptoms of CO2 retention  Outcome: Progressing     Problem: SKIN/TISSUE INTEGRITY - ADULT  Goal: Skin integrity remains intact  Description: INTERVENTIONS  - Assess and document risk factors for pressure ulcer development  - Assess and document skin integrity  - Monitor for areas of redness and/or skin breakdown  - Initiate interventions, skin care algorithm/standards of care as needed  Outcome: Progressing  Goal: Incision(s), wounds(s) or drain site(s) healing without S/S of infection  Description: INTERVENTIONS:  - Assess and document risk factors for pressure ulcer development  - Assess and document skin integrity  - Assess and document dressing/incision, wound bed, drain sites and surrounding tissue  - Implement wound care per orders  - Initiate isolation precautions as appropriate  -  Initiate Pressure Ulcer prevention bundle as indicated  Outcome: Progressing     Problem: HEMATOLOGIC - ADULT  Goal: Maintains hematologic stability  Description: INTERVENTIONS  - Assess for signs and symptoms of bleeding or hemorrhage  - Monitor labs and vital signs for trends  - Administer supportive blood products/factors, fluids and medications as ordered and appropriate  - Administer supportive blood products/factors as ordered and appropriate  Outcome: Progressing  Goal: Free from bleeding injury  Description: (Example usage: patient with low platelets)  INTERVENTIONS:  - Avoid intramuscular injections, enemas and rectal medication administration  - Ensure safe mobilization of patient  - Hold pressure on venipuncture sites to achieve adequate hemostasis  - Assess for signs and symptoms of internal bleeding  - Monitor lab trends  - Patient is to report abnormal signs of bleeding to staff  - Avoid use of toothpicks and dental floss  - Use electric shaver for shaving  - Use soft bristle tooth brush  - Limit straining and forceful nose blowing  Outcome: Progressing     Problem: MUSCULOSKELETAL - ADULT  Goal: Return mobility to safest level of function  Description: INTERVENTIONS:  - Assess patient stability and activity tolerance for standing, transferring and ambulating w/ or w/o assistive devices  - Assist with transfers and ambulation using safe patient handling equipment as needed  - Ensure adequate protection for wounds/incisions during mobilization  - Obtain PT/OT consults as needed  - Advance activity as appropriate  - Communicate ordered activity level and limitations with patient/family  Outcome: Progressing

## 2024-12-29 NOTE — PLAN OF CARE
Pt is alert, 1L via NC, wound care done by this RN, EKG done, see results.   Problem: PAIN - ADULT  Goal: Verbalizes/displays adequate comfort level or patient's stated pain goal  Description: INTERVENTIONS:  - Encourage pt to monitor pain and request assistance  - Assess pain using appropriate pain scale  - Administer analgesics based on type and severity of pain and evaluate response  - Implement non-pharmacological measures as appropriate and evaluate response  - Consider cultural and social influences on pain and pain management  - Manage/alleviate anxiety  - Utilize distraction and/or relaxation techniques  - Monitor for opioid side effects  - Notify MD/LIP if interventions unsuccessful or patient reports new pain  - Anticipate increased pain with activity and pre-medicate as appropriate  Outcome: Progressing     Problem: RISK FOR INFECTION - ADULT  Goal: Absence of fever/infection during anticipated neutropenic period  Description: INTERVENTIONS  - Monitor WBC  - Administer growth factors as ordered  - Implement neutropenic guidelines  Outcome: Progressing     Problem: SKIN/TISSUE INTEGRITY - ADULT  Goal: Skin integrity remains intact  Description: INTERVENTIONS  - Assess and document risk factors for pressure ulcer development  - Assess and document skin integrity  - Monitor for areas of redness and/or skin breakdown  - Initiate interventions, skin care algorithm/standards of care as needed  Outcome: Progressing  Goal: Incision(s), wounds(s) or drain site(s) healing without S/S of infection  Description: INTERVENTIONS:  - Assess and document risk factors for pressure ulcer development  - Assess and document skin integrity  - Assess and document dressing/incision, wound bed, drain sites and surrounding tissue  - Implement wound care per orders  - Initiate isolation precautions as appropriate  - Initiate Pressure Ulcer prevention bundle as indicated  Outcome: Progressing

## 2024-12-29 NOTE — CONSULTS
[facility]  part of [facility]    Cardiology Consultation    Amy Wagoner Patient Status:  Inpatient    1925 MRN [id number]   Location [facility]5W Attending Jane Montague,    Hosp Day # 2 PCP PHYSICIAN NONSTAFF     Date of Admission:  2024  Date of Consult:  2024  Reason for Consultation: bradycardia, sinus pauses    History of Present Illness:   Patient is a 89 year old female with significant past medical history of hypertension and CKD IV who has been admitted for acute influenza infection associated with bronchitis.  Has been treated with Tamiflu, steroids and nebulizer therapies.  Cardiology consulted for sinus bradycardia and sinus pauses.  Of note patient has been on atenolol 25 mg daily, last dose yesterday AM.  Telemetry with sinus bradycardia, rates mostly in low 50s with multiple sinus pauses longest 3 seconds.  Patient lethargic appearing, nonverbal    Assessment and Plan:   Altered mentation  Influenza A, acute  Bronchitis secondary to acute respiratory illness  CKD stage IV    Sinus bradycardia, sinus pauses  -Telemetry with rates mostly in 50s, multiple sinus pauses longest 3 seconds  -remains hemodynamically stable  -likely related to beta-blockade therapy, specifically atenolol, which given her CKD stage IV likely results in drug accumulation as this particular beta-blocker is primarily renally excreted  -hold all AVN blocking agents for now  -continue to monitor tele closely    Hypertension  -on amlodipine 5mg daily, consider increasing to 10 mg  -avoid AVN blocking agents, discontinued atenolol given associated SB/pauses and CKD-IV    Past Medical History  Past Medical History:    Essential hypertension    Hearing impairment    bilat Hearing aids    High blood pressure       Past Surgical History  Past Surgical History:   Procedure Laterality Date    Lumpectomy left      Mastectomy right      Mastectomy,simple      Oophorectomy       Thyroidectomy      partial aboy 40y.ago    Total hip replacement Right        Family History  No family history on file.    Social History  Pediatric History   Patient Parents    Not on file     Other Topics Concern    Not on file   Social History Narrative    Not on file           Current Medications:  Current Facility-Administered Medications   Medication Dose Route Frequency    methylPREDNISolone sodium succinate (Solu-MEDROL) injection 20 mg  20 mg Intravenous Q12H    sodium chloride 0.9% infusion   Intravenous Continuous    metoclopramide (Reglan) 5 mg/mL injection 5 mg  5 mg Intravenous Q8H PRN    oseltamivir (Tamiflu) cap 30 mg  30 mg Oral Daily    amLODIPine (Norvasc) tab 5 mg  5 mg Oral Daily    [Held by provider] atenolol (Tenormin) tab 25 mg  25 mg Oral Daily    docusate sodium (Colace) cap 100 mg  100 mg Oral Daily PRN    heparin (Porcine) 5000 UNIT/ML injection 5,000 Units  5,000 Units Subcutaneous 2 times per day    acetaminophen (Tylenol Extra Strength) tab 500 mg  500 mg Oral Q4H PRN    ondansetron (Zofran) 4 MG/2ML injection 4 mg  4 mg Intravenous Q6H PRN    ipratropium-albuterol (Duoneb) 0.5-2.5 (3) MG/3ML inhalation solution 3 mL  3 mL Nebulization Q6H PRN     Medications Prior to Admission   Medication Sig    albuterol 108 (90 Base) MCG/ACT Inhalation Aero Soln Inhale 2 puffs into the lungs every 4 (four) hours as needed.    hydroCHLOROthiazide 25 MG Oral Tab Take 1 tablet (25 mg total) by mouth 2 (two) times daily.    amLODIPine 5 MG Oral Tab Take 1 tablet (5 mg total) by mouth daily.    atenolol 25 MG Oral Tab Take 1 tablet (25 mg total) by mouth daily.    docusate sodium 100 MG Oral Cap Take 100 mg by mouth daily as needed.       Allergies  Allergies[1]    Review of Systems:   ROS  10 point review of systems completed and negative except as noted.    Physical Exam:   Patient Vitals for the past 24 hrs:   BP Temp Temp src Pulse Resp SpO2   12/28/24 1637 141/60 97.5 °F (36.4 °C) Oral 53 18 95 %    12/28/24 0900 -- 97.6 °F (36.4 °C) Axillary -- -- --   12/28/24 0820 -- -- -- -- -- 93 %   12/28/24 0817 138/66 -- -- 72 16 (!) 88 %   12/28/24 0455 150/75 97.4 °F (36.3 °C) Axillary 68 18 93 %   12/28/24 0300 -- -- -- 68 -- --   12/27/24 2001 142/82 97.2 °F (36.2 °C) Oral 83 22 95 %       Intake/Output:   Last 3 shifts:   Intake/Output                   12/26/24 0700 - 12/27/24 0659 12/27/24 0700 - 12/28/24 0659 12/28/24 0700 - 12/29/24 0659       Intake    P.O.  --  500  320    P.O. -- 500 320    I.V.  1000  10  --    I.V. -- 10 --    Volume (mL) (sodium chloride 0.9 % IV bolus 1,000 mL) 1000 -- --    Total Intake 1000 510 320       Output    Urine  350  400  400    Urine -- 100 --    Incontinent Urine Occurrence -- 1 x 1 x    Output (mL) (External Urinary Catheter) 350 300 400    Stool  --  --  --    Stool Count Calculated for I/O -- -- 3 x    Total Output 350 400 400       Net I/O     650 110 -80          Vent Settings:    Hemodynamic parameters (last 24 hours):    Scheduled Meds:    methylPREDNISolone  20 mg Intravenous Q12H    oseltamivir  30 mg Oral Daily    amLODIPine  5 mg Oral Daily    [Held by provider] atenolol  25 mg Oral Daily    heparin  5,000 Units Subcutaneous 2 times per day     Continuous Infusions:    sodium chloride 50 mL/hr at 12/28/24 1342       Physical Exam:  General: Lethargic appearing  HEENT: Normocephalic, no thyromegaly or adenopathy.  Neck: No JVD, carotids 2+, no bruits.  Cardiac: Bradycardic, regular rhythm  Lungs: Diminished effort, coarse breath sounds, +rhonchi  Abdomen: Soft, non-tender. BS-present.  Extremities: Without clubbing or cyanosis. No edema.    Neurologic: Unable to obtain  Skin: Warm and dry.     Results:   Laboratory Data:  Lab Results   Component Value Date    WBC 5.3 12/28/2024    HGB 8.6 (L) 12/28/2024    HCT 25.5 (L) 12/28/2024    .0 12/28/2024    CREATSERUM 2.20 (H) 12/28/2024    BUN 52 (H) 12/28/2024     12/28/2024    K 4.8 12/28/2024      12/28/2024    CO2 21.0 12/28/2024     (H) 12/28/2024    CA 8.8 12/28/2024    ALB 4.0 12/26/2024    ALKPHO 135 12/26/2024    TP 7.5 12/26/2024    AST 21 12/26/2024    ALT 9 (L) 12/26/2024    INR 0.93 09/23/2018    PTP 12.9 09/23/2018    T4F 0.8 12/27/2024    TSH 8.500 (H) 12/27/2024    LIP 71 (L) 06/10/2022     (H) 09/22/2018    ESRML 69 (H) 09/12/2022    MG 1.9 06/17/2022    PHOS 2.7 06/17/2022    B12 807 09/20/2018         Recent Labs   Lab 12/26/24  1609 12/27/24  0516 12/28/24  0503   GLU 70 72 114*   BUN 37* 37* 52*   CREATSERUM 2.33* 2.05* 2.20*   CA 9.7 9.0 8.8    138 139   K 5.2* 5.5* 4.8    108 108   CO2 25.0 22.0 21.0     Recent Labs   Lab 12/26/24  1609 12/27/24  0516 12/28/24  0503   RBC 3.33* 3.20* 2.85*   HGB 9.8* 9.4* 8.6*   HCT 30.2* 29.7* 25.5*   MCV 90.7 92.8 89.5   MCH 29.4 29.4 30.2   MCHC 32.5 31.6 33.7   RDW 18.8* 18.6* 18.3*   NEPRELIM 4.06 3.34 4.67   WBC 4.9 3.8* 5.3   .0 207.0 239.0       No results for input(s): \"BNPML\" in the last 168 hours.    No results for input(s): \"TROP\", \"CK\" in the last 168 hours.    Imaging:  No results found.    Thank you for allowing me to participate in the care of your patient.    Charlie Carrera, [facility] [place]         [1] No Known Allergies

## 2024-12-29 NOTE — PROGRESS NOTES
[facility]  part of [facility]    Progress Note    Amy Wagoner Patient Status:  Inpatient    1925 MRN [id number]   Location [facility]5W Attending Jane Montague DO   Hosp Day # 3 PCP PHYSICIAN NONSTAFF     Chief complaint flu    Subjective:     Patient slightly confused this morning which is usual for her tends to become more clear minded in the afternoon and sleep until 11, earlier per RN was refusing blood draws IV fluids, states \"I am 89 years old I do not want any of this\"    ROS:   No cp, sob   No c/d   No n/v     Objective:   Blood pressure 154/75, pulse 68, temperature 97.3 °F (36.3 °C), temperature source Oral, resp. rate 18, height 5' 1\" (1.549 m), weight 135 lb 12.8 oz (61.6 kg), SpO2 94%.      Intake/Output Summary (Last 24 hours) at 2024 0748  Last data filed at 2024 0233  Gross per 24 hour   Intake 820 ml   Output 650 ml   Net 170 ml       Patient Weight(s) for the past 336 hrs:   Weight   24 2100 135 lb 12.8 oz (61.6 kg)   24 1603 130 lb 1.1 oz (59 kg)           General appearance: alert, appears stated age and cooperative  Pulmonary: no wheezing today, upper airway congestion   Cardiovascular: S1, S2 normal, no murmur, click, rub or gallop, regular rate and rhythm  Abdominal: soft, non-tender; bowel sounds normal; no masses,  no organomegaly  Extremities: extremities normal, atraumatic, no cyanosis or edema        Medicines:     Current Facility-Administered Medications   Medication Dose Route Frequency    methylPREDNISolone sodium succinate (Solu-MEDROL) injection 20 mg  20 mg Intravenous Q12H    sodium chloride 0.9% infusion   Intravenous Continuous    metoclopramide (Reglan) 5 mg/mL injection 5 mg  5 mg Intravenous Q8H PRN    oseltamivir (Tamiflu) cap 30 mg  30 mg Oral Daily    amLODIPine (Norvasc) tab 5 mg  5 mg Oral Daily    [Held by provider] atenolol (Tenormin) tab 25 mg  25 mg Oral Daily    docusate sodium (Colace) cap 100 mg   100 mg Oral Daily PRN    heparin (Porcine) 5000 UNIT/ML injection 5,000 Units  5,000 Units Subcutaneous 2 times per day    acetaminophen (Tylenol Extra Strength) tab 500 mg  500 mg Oral Q4H PRN    ondansetron (Zofran) 4 MG/2ML injection 4 mg  4 mg Intravenous Q6H PRN    ipratropium-albuterol (Duoneb) 0.5-2.5 (3) MG/3ML inhalation solution 3 mL  3 mL Nebulization Q6H PRN                   Blood Pressure and Cardiac Medications            amLODIPine 5 MG Oral Tab    atenolol 25 MG Oral Tab             sodium chloride 50 mL/hr at 12/28/24 2345           Lab Results   Component Value Date    WBC 5.3 12/28/2024    HGB 8.6 (L) 12/28/2024    HCT 25.5 (L) 12/28/2024    .0 12/28/2024    CREATSERUM 2.20 (H) 12/28/2024    BUN 52 (H) 12/28/2024     12/28/2024    K 4.8 12/28/2024     12/28/2024    CO2 21.0 12/28/2024     (H) 12/28/2024    CA 8.8 12/28/2024    ALB 4.0 12/26/2024    ALKPHO 135 12/26/2024    BILT 0.2 12/26/2024    TP 7.5 12/26/2024    AST 21 12/26/2024    ALT 9 (L) 12/26/2024    INR 0.93 09/23/2018    T4F 0.8 12/27/2024    TSH 8.500 (H) 12/27/2024    LIP 71 (L) 06/10/2022     (H) 09/22/2018    ESRML 69 (H) 09/12/2022    MG 1.9 06/17/2022    PHOS 2.7 06/17/2022    B12 807 09/20/2018       No results found.  EKG 12 Lead    Result Date: 12/28/2024  Atrial fibrillation Right bundle branch block Abnormal ECG When compared with ECG of 26-DEC-2024 16:23, Atrial fibrillation has replaced Atrial flutter Vent. rate has decreased BY  30 BPM Nonspecific T wave abnormality has replaced inverted T waves in Inferior leads     Results:     CBC:    Lab Results   Component Value Date    WBC 5.3 12/28/2024    WBC 3.8 (L) 12/27/2024    WBC 4.9 12/26/2024     Lab Results   Component Value Date    HGB 8.6 (L) 12/28/2024    HGB 9.4 (L) 12/27/2024    HGB 9.8 (L) 12/26/2024      Lab Results   Component Value Date    .0 12/28/2024    .0 12/27/2024    .0 12/26/2024       Recent Labs   Lab  12/26/24  1609 12/27/24  0516 12/28/24  0503   GLU 70 72 114*   BUN 37* 37* 52*   CREATSERUM 2.33* 2.05* 2.20*   CA 9.7 9.0 8.8    138 139   K 5.2* 5.5* 4.8    108 108   CO2 25.0 22.0 21.0           Assessment and Plan:      ASSESSMENT:       Influenza bronchitis with bronchospasm.  - wheezing better - cont tamiflu and steroids   - cont nebs but no wheezing today   - cxr neg for pna   - wean one liter of oxygen if able   -Discussed with son who agrees to palliative care versus social work to arrange 24-hour home health care with home PT OT at discharge.  Discharge pending disposition otherwise medically cleared       BC:  BB held  Cardiology consulted appr recs     Generalized weakness.  - pt/ot when improved        Chronic kidney disease stage 4.  - creat near baseline. Bun elevated due to steroids      Essential hypertension.   Heel pain - wound care to see for pressure ulcers noted on admit                      Chart reviewed, including current vitals, notes, labs and imaging  Labs ordered and medications adjusted as outlined above  Coordinate care with care team/consultants  Discussed with patient results of tests, management plan as outlined above, and the need for ongoing hospitalization  D/w RN     MDM high            **Certification      PHYSICIAN Certification of Need for Inpatient Hospitalization - Initial Certification    Patient will require inpatient services that will reasonably be expected to span two midnight's based on the clinical documentation in H+P.   Based on patients current state of illness, I anticipate that, after discharge, patient will require TBD.        Supplementary Documentation:   DVT Mechanical Prophylaxis:        DVT Pharmacologic Prophylaxis   Medication    heparin (Porcine) 5000 UNIT/ML injection 5,000 Units                Code Status: DNAR/Selective Treatment  Foster: External urinary catheter in place  Foster Duration (in days):   Central line:    POLY:          **Certification      PHYSICIAN Certification of Need for Inpatient Hospitalization - Initial Certification    Patient will require inpatient services that will reasonably be expected to span two midnight's based on the clinical documentation in H+P.   Based on patients current state of illness, I anticipate that, after discharge, patient will require TBD.

## 2024-12-29 NOTE — PLAN OF CARE
Alert and oriented x4. R arm precautions maintained. UA collected. Complained of R heel hurting, heel elevated w/ blue boot. Call light within reach.      Pt more confused this AM, ripped out IV. Refusing IV stating fluids were stopped before she went home. Son called and situation explained. Per son, pt is confused in the AM and can attempt to try IV around 0900 or 0930 when pt becomes more oriented usually.    Problem: Patient Centered Care  Goal: Patient preferences are identified and integrated in the patient's plan of care  Description: Interventions:  - What would you like us to know as we care for you? From home alone   - Provide timely, complete, and accurate information to patient/family  - Incorporate patient and family knowledge, values, beliefs, and cultural backgrounds into the planning and delivery of care  - Encourage patient/family to participate in care and decision-making at the level they choose  - Honor patient and family perspectives and choices  12/29/2024 0406 by Allison Mahmood, RN  Outcome: Progressing    Problem: Patient/Family Goals  Goal: Patient/Family Long Term Goal  Description: Patient's Long Term Goal: to return to baseline    Interventions:  - Monitor vital signs  - Monitor appropriate labs   - Pain management   - Administer medications per order   - Follow MD orders   - IV steroids  - Diagnostics per order   - Update/inform patient and family on plan of care   - Discharge planning   - See additional Care Plan goals for specific interventions  12/29/2024 0406 by Allison Mahmood, RN  Outcome: Progressing    Goal: Patient/Family Short Term Goal  Description: Patient's Short Term Goal: to feel better    Interventions:   - Monitor vital signs  - Monitor appropriate labs   - Pain management   - Administer medications per order   - Follow MD orders   - IV steroids  - Diagnostics per order   - Update/inform patient and family on plan of care   - Discharge planning   - See additional Care Plan  goals for specific interventions  12/29/2024 0406 by Allison Mahmood RN  Outcome: Progressing     Problem: PAIN - ADULT  Goal: Verbalizes/displays adequate comfort level or patient's stated pain goal  Description: INTERVENTIONS:  - Encourage pt to monitor pain and request assistance  - Assess pain using appropriate pain scale  - Administer analgesics based on type and severity of pain and evaluate response  - Implement non-pharmacological measures as appropriate and evaluate response  - Consider cultural and social influences on pain and pain management  - Manage/alleviate anxiety  - Utilize distraction and/or relaxation techniques  - Monitor for opioid side effects  - Notify MD/LIP if interventions unsuccessful or patient reports new pain  - Anticipate increased pain with activity and pre-medicate as appropriate  12/29/2024 0406 by Allison Mahmood RN  Outcome: Progressing     Problem: RISK FOR INFECTION - ADULT  Goal: Absence of fever/infection during anticipated neutropenic period  Description: INTERVENTIONS  - Monitor WBC  - Administer growth factors as ordered  - Implement neutropenic guidelines  12/29/2024 0406 by Allison Mahmood RN  Outcome: Progressing    Problem: SAFETY ADULT - FALL  Goal: Free from fall injury  Description: INTERVENTIONS:  - Assess pt frequently for physical needs  - Identify cognitive and physical deficits and behaviors that affect risk of falls.  - [place] fall precautions as indicated by assessment.  - Educate pt/family on patient safety including physical limitations  - Instruct pt to call for assistance with activity based on assessment  - Modify environment to reduce risk of injury  - Provide assistive devices as appropriate  - Consider OT/PT consult to assist with strengthening/mobility  - Encourage toileting schedule  12/29/2024 0406 by Allison Mahmood RN  Outcome: Progressing    Problem: CARDIOVASCULAR - ADULT  Goal: Maintains optimal cardiac output and hemodynamic  stability  Description: INTERVENTIONS:  - Monitor vital signs, rhythm, and trends  - Monitor for bleeding, hypotension and signs of decreased cardiac output  - Evaluate effectiveness of vasoactive medications to optimize hemodynamic stability  - Monitor arterial and/or venous puncture sites for bleeding and/or hematoma  - Assess quality of pulses, skin color and temperature  - Assess for signs of decreased coronary artery perfusion - ex. Angina  - Evaluate fluid balance, assess for edema, trend weights  12/29/2024 0406 by Allison Mahmood RN  Outcome: Progressing  Goal: Absence of cardiac arrhythmias or at baseline  Description: INTERVENTIONS:  - Continuous cardiac monitoring, monitor vital signs, obtain 12 lead EKG if indicated  - Evaluate effectiveness of antiarrhythmic and heart rate control medications as ordered  - Initiate emergency measures for life threatening arrhythmias  - Monitor electrolytes and administer replacement therapy as ordered  12/29/2024 0406 by Allison Mahmood RN  Outcome: Progressing     Problem: RESPIRATORY - ADULT  Goal: Achieves optimal ventilation and oxygenation  Description: INTERVENTIONS:  - Assess for changes in respiratory status  - Assess for changes in mentation and behavior  - Position to facilitate oxygenation and minimize respiratory effort  - Oxygen supplementation based on oxygen saturation or ABGs  - Provide Smoking Cessation handout, if applicable  - Encourage broncho-pulmonary hygiene including cough, deep breathe, Incentive Spirometry  - Assess the need for suctioning and perform as needed  - Assess and instruct to report SOB or any respiratory difficulty  - Respiratory Therapy support as indicated  - Manage/alleviate anxiety  - Monitor for signs/symptoms of CO2 retention  12/29/2024 0406 by Allison Mahmood, RN  Outcome: Progressing     Problem: SKIN/TISSUE INTEGRITY - ADULT  Goal: Skin integrity remains intact  Description: INTERVENTIONS  - Assess and document risk factors for  pressure ulcer development  - Assess and document skin integrity  - Monitor for areas of redness and/or skin breakdown  - Initiate interventions, skin care algorithm/standards of care as needed  12/29/2024 0406 by Allison Mahmood RN  Outcome: Progressing    Goal: Incision(s), wounds(s) or drain site(s) healing without S/S of infection  Description: INTERVENTIONS:  - Assess and document risk factors for pressure ulcer development  - Assess and document skin integrity  - Assess and document dressing/incision, wound bed, drain sites and surrounding tissue  - Implement wound care per orders  - Initiate isolation precautions as appropriate  - Initiate Pressure Ulcer prevention bundle as indicated  12/29/2024 0406 by Allison Mahmood RN  Outcome: Progressing     Problem: HEMATOLOGIC - ADULT  Goal: Maintains hematologic stability  Description: INTERVENTIONS  - Assess for signs and symptoms of bleeding or hemorrhage  - Monitor labs and vital signs for trends  - Administer supportive blood products/factors, fluids and medications as ordered and appropriate  - Administer supportive blood products/factors as ordered and appropriate  12/29/2024 0406 by Allison Mahmood RN  Outcome: Progressing  Goal: Free from bleeding injury  Description: (Example usage: patient with low platelets)  INTERVENTIONS:  - Avoid intramuscular injections, enemas and rectal medication administration  - Ensure safe mobilization of patient  - Hold pressure on venipuncture sites to achieve adequate hemostasis  - Assess for signs and symptoms of internal bleeding  - Monitor lab trends  - Patient is to report abnormal signs of bleeding to staff  - Avoid use of toothpicks and dental floss  - Use electric shaver for shaving  - Use soft bristle tooth brush  - Limit straining and forceful nose blowing  12/29/2024 0406 by Allison Mahmood RN  Outcome: Progressing     Problem: MUSCULOSKELETAL - ADULT  Goal: Return mobility to safest level of function  Description:  INTERVENTIONS:  - Assess patient stability and activity tolerance for standing, transferring and ambulating w/ or w/o assistive devices  - Assist with transfers and ambulation using safe patient handling equipment as needed  - Ensure adequate protection for wounds/incisions during mobilization  - Obtain PT/OT consults as needed  - Advance activity as appropriate  - Communicate ordered activity level and limitations with patient/family  12/29/2024 0406 by Allison Mahmood, RN  Outcome: Progressing

## 2024-12-29 NOTE — CM/SW NOTE
RAND Garcia informed CM that family may be interested in [facility]/24 hr care stay temporarily.  Per Radha, family agreeable to respite rate if Medicare will not cover stay.    CM sent tentative [facility] referrals via [employer] - requesting respite rates if facility is able to accept.  PASRR completed and uploaded to referral.    CM/SW to meet with patient/family to provide list of accepting facilities/confirm choice if  agreeable to plan.    / to remain available for support and/or discharge planning.     Plan: Home w/ caregivers, [facility] Care [facility] and [facility] VS [facility] under potential respite - pending patient/family discharge preference, list/choice facility, medical clearance    Chloe Palomares RN, BSN  Nurse   [phone]

## 2024-12-30 PROBLEM — Z71.89 ADVANCE CARE PLANNING: Status: ACTIVE | Noted: 2024-01-01

## 2024-12-30 PROBLEM — I63.81 LACUNAR STROKE (HCC): Status: ACTIVE | Noted: 2024-01-01

## 2024-12-30 PROBLEM — Z51.5 PALLIATIVE CARE BY SPECIALIST: Status: ACTIVE | Noted: 2024-01-01

## 2024-12-30 NOTE — CONSULTS
[facility]  part of [facility]  Palliative Care Initial Consult Note    Amy Wagoner Patient Status:  Inpatient    1925 MRN [id number]   Location [facility]5W Attending Jane Montague,    Hosp Day # 4 PCP PHYSICIAN NONSTAFF     Date of Consult: 2024  Patient seen at: [facility]    The  Cures Act makes medical notes like these available to patients in the interest of transparency. Please be advised this is a medical document. Medical documents are intended to carry relevant information, facts as evident, and the clinical opinion of the practitioner. The medical note is intended as peer to peer communication and may appear blunt or direct. It is written in medical language and may contain abbreviations or verbiage that are unfamiliar.     Reason for Consultation: Consult ordered by:: Dr. Truong for evaluation of Palliative Care needs and Goals of care discussion.    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 See below for reviewed labs and imaging. Pt was admitted for treatment and evaluation of influenza A positive, and RUBI on CKD. Stroke alert called this morning after pt was found with worsening AMS, L sided flaccid, pinpoint pupils, staring off, and hypoxia now with acute respiratory failure on NRBM. CT brain showed possible stroke and neurology has been consulted. MRI brain and EEG pending. Discussed case with Dr. Montague concern for possible stroke vs seizure. See below for reviewed imaging.     She is being followed by cardiology and neurology services.    I reviewed labs and imaging-see below. History was obtained  from Epic and pt's son Kenneth as pt is unable to provide any history.  Today is day 4 of hospitalization.     I previously evaluated this pt in the past for palliative care during her hospitalization in 2022.    Pt is listed as DNAR/DNI-selective in Epic,  and reviewed previously completed POLST which shows wishes for DNAR/DNI-selective, no feeding tubes.    Reviewed symptom needs past 24 hrs: none    Patient was seen and examined in bed no family at bedside. Pt is lethargic, she opens her eyes to command, but is not verbalizing or following any commands. She appears comfortable with no signs of respiratory distress or pain. She is now on NRBM 15L. NPO for now with AMS, moved her bowels yesterday. VSS.  See physical exam and ROS below.    Review of Systems/Palliative Care symptom needs assessed:   Unable to obtain ROS due to pt factors above      Medical History: obtained from [facility]  Past Medical History:    Essential hypertension    Hearing impairment    bilat Hearing aids    High blood pressure     Past Surgical History:   Procedure Laterality Date    Lumpectomy left      Mastectomy right      Mastectomy,simple      Oophorectomy      Thyroidectomy      partial aboy 40y.ago    Total hip replacement Right        Family History: obtained from [facility]  No family history on file.    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 She has never used smokeless tobacco.  reports no history of alcohol use.  reports no history of drug use.  Hx of Substance Use/Abuse: No    Spiritual Assessment:   [place]: Baptist   referral: son requests  to visit    Allergies:  Allergies[1]    Medications:     Current Facility-Administered Medications:     methylPREDNISolone sodium succinate (Solu-MEDROL) injection 20 mg, 20 mg,  Intravenous, Q12H    metoclopramide (Reglan) 5 mg/mL injection 5 mg, 5 mg, Intravenous, Q8H PRN    oseltamivir (Tamiflu) cap 30 mg, 30 mg, Oral, Daily    amLODIPine (Norvasc) tab 5 mg, 5 mg, Oral, Daily    [Held by provider] atenolol (Tenormin) tab 25 mg, 25 mg, Oral, Daily    docusate sodium (Colace) cap 100 mg, 100 mg, Oral, Daily PRN    heparin (Porcine) 5000 UNIT/ML injection 5,000 Units, 5,000 Units, Subcutaneous, 2 times per day    acetaminophen (Tylenol Extra Strength) tab 500 mg, 500 mg, Oral, Q4H PRN    ondansetron (Zofran) 4 MG/2ML injection 4 mg, 4 mg, Intravenous, Q6H PRN    ipratropium-albuterol (Duoneb) 0.5-2.5 (3) MG/3ML inhalation solution 3 mL, 3 mL, Nebulization, Q6H PRN    Nutritional status:  BMI: 25 Weight: 135  Weight changes: MIRNA  Current Appetite: Poor  Dysphagia: Yes    Functional Status History:  ADLs: bed bound and total care currently, previously was mostly in bed/wc bound at home and has CG support      Palliative Performance Scale:   Prior to admission: 40%  Observed during hospitalization: 10%  % Ambulation Activity Level Self-Care Intake Consciousness   100 Full  Normal  No Disease Full Normal Full   90 Full  Normal  Some Disease Full Normal Full   80 Full  Normal w/effort  Some Disease Full Normal or reduced Full   70 Reduced  Can't Perform Job  Some Disease Full Normal or reduced Full   60 Reduced  Can't Perform Hobby   Significant Disease Occ Assist Normal or reduced Full or confused   50 Mainly sit/lie Can't do any work  Extensive Disease Partial Assist Normal or reduced Full or confused   40 Mainly in bed Can't do any work  Extensive Disease Mainly Assist Normal or reduced Full or confused   30 Bed Bound Can't do any work  Extensive Disease Max Assist  Total Care Reduced  Drowsy/confused   20 Bed Bound Can't do any work  Extensive Disease Max Assist  Total Care Minimal  Drowsy/confused   10 Bed Bound Can't do any work  Extensive Disease Max Assist  Total Care Mouth Care   Drowsy/confused   0 Death        Objective      Vital Signs:  Blood pressure (!) 167/99, pulse 67, temperature 97.3 °F (36.3 °C), temperature source Axillary, resp. rate 22, height 5' 1\" (1.549 m), weight 135 lb 12.8 oz (61.6 kg), SpO2 100%.  Body mass index is 25.66 kg/m².  Present Level of pain: MIRNA  Non-verbal signs of pain present: No    Physical Exam:  General: Lethargic and in no apparent distress. Weak, chronically-ill appearing  HEENT: No focal deficits.  Cardiac: Regular rate and rhythm, S1, S2 normal, no murmur, rub or gallop.  Lungs: Clear without wheezes, rales, rhonchi or dullness.  Normal excursions and effort.  Abdomen: Soft, non-tender, normal bowel sounds X 4 quadrants, no rebound or guarding  Extremities: Without clubbing, cyanosis. Peripheral pulses are 1+. BLE Edema  present, R 4th/5th toes amputated  Neurologic: Lethargic, not verbalizing or following commands  Skin: Warm and dry. Pale    Hematology:  Lab Results   Component Value Date    WBC 5.3 12/28/2024    HGB 8.6 (L) 12/28/2024    HCT 25.5 (L) 12/28/2024    .0 12/28/2024       Coags:  Lab Results   Component Value Date    INR 0.93 09/23/2018       Chemistry:  Lab Results   Component Value Date    CREATSERUM 2.20 (H) 12/28/2024    BUN 52 (H) 12/28/2024     12/28/2024    K 4.8 12/28/2024     12/28/2024    CO2 21.0 12/28/2024     (H) 12/28/2024    CA 8.8 12/28/2024    ALB 4.0 12/26/2024    ALKPHO 135 12/26/2024    BILT 0.2 12/26/2024    TP 7.5 12/26/2024    AST 21 12/26/2024    ALT 9 (L) 12/26/2024    MG 1.9 06/17/2022    PHOS 2.7 06/17/2022       Imaging:  CT STROKE BRAIN (NO IV)(CPT=70450)    Result Date: 12/30/2024  CONCLUSION:   Limited exam secondary to motion artifact.  1. Small area of hypoattenuation involving the right caudate nucleus and adjacent white matter, which reflects age-indeterminate ischemic change.  Recommend further evaluation with an MRI brain. 2. Scattered foci and patchy areas of  hypoattenuation involving the periventricular and subcortical white matter, which are also age indeterminate but favored to reflect mild chronic microvascular ischemic changes.  3. No acute intracranial hemorrhage, midline shift, mass effect, or hydrocephalus. 4. Mild global parenchymal volume loss. 5. Acute on chronic left maxillary and right sphenoid sinusitis. 6. Lesser incidental findings described above.   Impression points 1-3 were communicated via telephone to Derick GORDILLO at 8:56 a.m. on 12/30/2024 by Guy Dunn MD    Dictated by (CST): Guy Dunn MD on 12/30/2024 at 8:49 AM     Finalized by (CST): Guy Dunn MD on 12/30/2024 at 8:58 AM              12/26/24 CXR     CONCLUSION:   1. No evidence of pneumonia.       Summary of GOC Discussion        I discussed reason for palliative care consultation with patient's son Dr. Kenneth Wagoner who is her HCPOA.     I differentiated the palliative treatment-focus model versus the hospice comfort-focused philosophy of care. I informed the patient/family that having palliative care support does not limit medical treatment options or decisions to those who wish to continue curative or restorative medical therapies. I discussed the benefits of palliative care to include assistance with arising symptom management needs, an extra layer of support, to ensure GOC are respected throughout healthcare continuum, and assist with transition to hospice care when appropriate.  Palliative care handout provided at bedside.    Outpatient/Community Palliative Care Services:  Usually visit once per 4 weeks depending on contracted agency guidelines  Focus on GOC and symptom management   Palliative Care criteria:  Not altered by prognosis   Does not limit curative or restorative therapies      Outpatient Hospice services:  24/7 phone triage services   RN visit one or more times per week depending on need  Home health aid to assist in ADLs/hygiene   Hospice criteria:  Less than  six-month prognosis   Must forego most life-prolonging  measures/treatments   Focus solely on comfort   Must sign onto hospice benefit with agency     Background provided by family:  -Son tells me pt has been having recent cognitive decline. He tells me she was concerned she may have had mini strokes as she was having more difficulty with tasks and forgetful.    Prognostic awareness/understanding: Good    -I  discussed current clinical condition and explored previous discussions with MDs. I reinforced her overall guarded prognosis per MD. Discussed concern for possible stroke and son tells me he doesn't want to put her through MRI brain as it will not change her management.     Hopes/goals/concerns:   -Son tells me he knows his mother would not want any heroic measures taken if her condition worsens. He says in the past she always wanted to \"die in her own bed at home.\"     -I discussed if her mental status/condition are not improving recommend consideration for comfort care with hospice. Provided basic overview of Medicare hospice philosophy and benefit. Son is agreeable to talking with [facility] for informational session.     -I confirmed wishes for DNAR/DNI-selective, no feeding tubes and continue supportive care while hospitalized.    -Ongoing GOC discussions will be needed through clinical course. Agreeable to palliative care following.     -Provided emotional support to pt's son who is coping adequately.     Procedures:  No intubation  No feeding tubes    Assessment and Recommendations      Problem List:    AMS concern for possible stroke vs seizure  Acute hypoxemic respiratory falure  Influenza A bronchitis with bronchospasm  RUBI on CKD  Sinus bradycardia  HTN  Anemia  PAD  Weakness    Goals of care counseling  -see above for details  -Confirmed wishes for DNAR/DNI-selective and continue supportive care. Son is contemplating possible comfort care with hospice.  -[facility] to evaluate  and provide informational session.  -Ongoing GOC discussions will be needed through clinical course.  -Agreeable to palliative care following  -Dispo:  Possible home with CG support and hospice pending meeting outcome.  SW to help with dc planning.   -Provided emotional support to pt's son who is coping adequately.    Advance care planning  -see above for details  -Pt's [facility] Dr. [phone].  -Previously completed POLST paperwork on file in [employer].     Palliative Performance Scale 10%    Emotion support provided to patient/family today: Yes    A total of 60 mins were spent on this consult, which included all of the following:direct face to face contact, history taking, physical examination, and >50% was spent counseling and coordinating care.    Discussed today's visit with Dr. Montague, Dr. Quiles, [facility] hospice and Radha GORDILLO.    I will continue to follow clinically.      Thank you for allowing Palliative Care services to participate in the care of Ms. Amy Wagoner.       Teresita Melton, NOEL-BC, [facility] [id number]  12/30/2024  12:13 PM  Palliative Care Services          [1] No Known Allergies

## 2024-12-30 NOTE — CM/SW NOTE
MD order received regarding hospice.  Referral has been made to [facility] and they will be following up with the pt. And family.     PARAMJIT Schwarz ext [phone]

## 2024-12-30 NOTE — PROCEDURES
EEG report    REFERRING PHYSICIAN: Jane Montague DO    PCP and phone number:  PHYSICIAN NONSTAFF  None    TECHNIQUE: 21 channels of EEG, 2 channels of EOG, and 1 channel of EKG were recorded utilizing the International 10/20 System. The recording was performed in a digitized monopolar referential format and playback was reformatted into various referential and bipolar montages utilizing appropriate filter settings. Automatic seizure and spike detection programs were utilized throughout the recording.  Video was recorded during the study    CLINICAL DATA:  Patient is sent for the evaluation of possible seizures.    MEDICATION:  Continuous Medications:    Scheduled Medications:  No current outpatient medications on file.  PRN Medications:    metoclopramide    docusate sodium    acetaminophen    ondansetron    ipratropium-albuterol    ACTIVATION:  Hyperventilation: Not done  Photic stimulation: Not done  Sleep: No clear sleep architecture was seen.    BACKGROUND    Background slow activity was seen. Throughout the record, a moderate amount of low to medium voltage, polymorphic, 3-4 Hz slow wave activity was seen diffusely over both hemispheres without localization or lateralization. No clear posterior dominant rhythm was seen.    EEG ABNORMALITY  None    IMPRESSION:    This is an abnormal EEG. Diffuse slowing was present continuously. These waveforms are not considered epileptiform in nature. This constellation of findings indicates a moderate degree of cerebral dysfunction consistent with metabolic/hypoxic encephalopathy or bilateral structural process or a medication effect. No focal, lateralized, or epileptiform features are noted.

## 2024-12-30 NOTE — PLAN OF CARE
Problem: Patient Centered Care  Goal: Patient preferences are identified and integrated in the patient's plan of care  Description: Interventions:  - What would you like us to know as we care for you? From home alone   - Provide timely, complete, and accurate information to patient/family  - Incorporate patient and family knowledge, values, beliefs, and cultural backgrounds into the planning and delivery of care  - Encourage patient/family to participate in care and decision-making at the level they choose  - Honor patient and family perspectives and choices  Outcome: Progressing     Problem: Patient/Family Goals  Goal: Patient/Family Long Term Goal  Description: Patient's Long Term Goal: to return to baseline    Interventions:  - Monitor vital signs  - Monitor appropriate labs   - Pain management   - Administer medications per order   - Follow MD orders   - IV steroids  - Diagnostics per order   - Update/inform patient and family on plan of care   - Discharge planning   - See additional Care Plan goals for specific interventions  Outcome: Progressing  Goal: Patient/Family Short Term Goal  Description: Patient's Short Term Goal: to feel better    Interventions:   - Monitor vital signs  - Monitor appropriate labs   - Pain management   - Administer medications per order   - Follow MD orders   - IV steroids  - Diagnostics per order   - Update/inform patient and family on plan of care   - Discharge planning   - See additional Care Plan goals for specific interventions  Outcome: Progressing     Problem: PAIN - ADULT  Goal: Verbalizes/displays adequate comfort level or patient's stated pain goal  Description: INTERVENTIONS:  - Encourage pt to monitor pain and request assistance  - Assess pain using appropriate pain scale  - Administer analgesics based on type and severity of pain and evaluate response  - Implement non-pharmacological measures as appropriate and evaluate response  - Consider cultural and social  influences on pain and pain management  - Manage/alleviate anxiety  - Utilize distraction and/or relaxation techniques  - Monitor for opioid side effects  - Notify MD/LIP if interventions unsuccessful or patient reports new pain  - Anticipate increased pain with activity and pre-medicate as appropriate  Outcome: Progressing     Problem: RISK FOR INFECTION - ADULT  Goal: Absence of fever/infection during anticipated neutropenic period  Description: INTERVENTIONS  - Monitor WBC  - Administer growth factors as ordered  - Implement neutropenic guidelines  Outcome: Progressing     Problem: SAFETY ADULT - FALL  Goal: Free from fall injury  Description: INTERVENTIONS:  - Assess pt frequently for physical needs  - Identify cognitive and physical deficits and behaviors that affect risk of falls.  - [place] fall precautions as indicated by assessment.  - Educate pt/family on patient safety including physical limitations  - Instruct pt to call for assistance with activity based on assessment  - Modify environment to reduce risk of injury  - Provide assistive devices as appropriate  - Consider OT/PT consult to assist with strengthening/mobility  - Encourage toileting schedule  Outcome: Progressing     Problem: SKIN/TISSUE INTEGRITY - ADULT  Goal: Incision(s), wounds(s) or drain site(s) healing without S/S of infection  Description: INTERVENTIONS:  - Assess and document risk factors for pressure ulcer development  - Assess and document skin integrity  - Assess and document dressing/incision, wound bed, drain sites and surrounding tissue  - Implement wound care per orders  - Initiate isolation precautions as appropriate  - Initiate Pressure Ulcer prevention bundle as indicated  Outcome: Progressing     Problem: HEMATOLOGIC - ADULT  Goal: Maintains hematologic stability  Description: INTERVENTIONS  - Assess for signs and symptoms of bleeding or hemorrhage  - Monitor labs and vital signs for trends  - Administer supportive blood  products/factors, fluids and medications as ordered and appropriate  - Administer supportive blood products/factors as ordered and appropriate  Outcome: Progressing  Goal: Free from bleeding injury  Description: (Example usage: patient with low platelets)  INTERVENTIONS:  - Avoid intramuscular injections, enemas and rectal medication administration  - Ensure safe mobilization of patient  - Hold pressure on venipuncture sites to achieve adequate hemostasis  - Assess for signs and symptoms of internal bleeding  - Monitor lab trends  - Patient is to report abnormal signs of bleeding to staff  - Avoid use of toothpicks and dental floss  - Use electric shaver for shaving  - Use soft bristle tooth brush  - Limit straining and forceful nose blowing  Outcome: Progressing

## 2024-12-30 NOTE — CONSULTS
[facility] [place]  [address], [address]  [place] [address]  [phone]          INPATIENT NEUROLOGY   INITIAL CONSULT NOTE       [facility]  part of [facility]    Report of Consultation    Amy Wagoner Patient Status:  Inpatient     1925 MRN [id number]    Location [facility] Attending Jane Montague,     Hosp Day # 4 PCP PHYSICIAN NONSTAFF      Date of Admission:  2024  Date of Consult:  2024    HPI:     Amy Wagoner is a 89 year old woman with past medical history of hypertension referral arterial disease, CKD stage IV, lumbar spondylosis, GERD was admitted 2024 with progressive weakness, cough and dyspnea be influenza positive.    Stroke alert was staring spell with left hemiparesis.  CT brain with age-indeterminate right basal ganglia ischemia and CTA head and neck cannot be done due to renal function.  EEG showed diffuse slowing.  Hospice has been consulted.    CURRENT MEDICATIONS  No current outpatient medications on file.       OUTPATIENT MEDICATIONS  Medications Ordered Prior to Encounter[1]     MEDICAL HISTORY  Past Medical History:    Essential hypertension    Hearing impairment    bilat Hearing aids    High blood pressure       ?SURGICAL HISTORY  Past Surgical History:   Procedure Laterality Date    Lumpectomy left      Mastectomy right      Mastectomy,simple      Oophorectomy      Thyroidectomy      partial aboy 40y.ago    Total hip replacement Right        SOCIAL HISTORY  Social History     Socioeconomic History    Marital status:    Tobacco Use    Smoking status: Never    Smokeless tobacco: Never   Vaping Use    Vaping status: Never Used   Substance and Sexual Activity    Alcohol use: No    Drug use: Never       FAMILY HISTORY  No family history on file.    ALLERGIES  Allergies[2]    ?REVIEW OF SYSTEMS:   Unable to obtain     ?PHYSICAL EXAM:   BP (!) 167/99 (BP Location: Left arm)   Pulse 67   Temp  97.3 °F (36.3 °C) (Axillary)   Resp 22   Ht 61\"   Wt 135 lb 12.8 oz (61.6 kg)   SpO2 100%   BMI 25.66 kg/m²   General appearance: Well appearing, and in no acute distress  Skin: skin color, texture normal.  No rashes or lesions.    Head: Normocephalic, atraumatic.    Neurological exam:  Awake, alert, but not attending, staring, does not follow commands or verbally respond, generalized weakness throughout worse on the left upper extremity; both lower extremities minimal movements       LABS/DATA:    Lab Results   Component Value Date    WBC 5.8 12/30/2024    HGB 9.6 12/30/2024    HCT 29.6 12/30/2024    .0 12/30/2024    CREATSERUM 2.09 12/30/2024    BUN 48 12/30/2024     12/30/2024    K 4.1 12/30/2024     12/30/2024    CO2 23.0 12/30/2024    GLU 84 12/30/2024    CA 8.7 12/30/2024       HGBA1C:    Lab Results   Component Value Date    A1C 5.6 06/10/2022    A1C 5.9 (H) 09/20/2018     06/10/2022                IMAGING:    CT STROKE BRAIN (NO IV)(CPT=70450)    Result Date: 12/30/2024  CONCLUSION:   Limited exam secondary to motion artifact.  1. Small area of hypoattenuation involving the right caudate nucleus and adjacent white matter, which reflects age-indeterminate ischemic change.  Recommend further evaluation with an MRI brain. 2. Scattered foci and patchy areas of hypoattenuation involving the periventricular and subcortical white matter, which are also age indeterminate but favored to reflect mild chronic microvascular ischemic changes.  3. No acute intracranial hemorrhage, midline shift, mass effect, or hydrocephalus. 4. Mild global parenchymal volume loss. 5. Acute on chronic left maxillary and right sphenoid sinusitis. 6. Lesser incidental findings described above.   Impression points 1-3 were communicated via telephone to Derick GORDILLO at 8:56 a.m. on 12/30/2024 by Guy Dunn MD    Dictated by (CST): Gyu Dunn MD on 12/30/2024 at 8:49 AM     Finalized by (CST): Shaun  MD Guy on 12/30/2024 at 8:58 AM            I PERSONALLY REVIEWED THESE IMAGES.     ASSESSMENT:  The patient is a 89 year old woman with past medical history of hypertension referral arterial disease, CKD stage IV, lumbar spondylosis, GERD was admitted 12/26/2024 with progressive weakness, cough and dyspnea be influenza positive.    Stroke alert was staring spell with left hemiparesis.  CT brain with age-indeterminate right basal ganglia ischemia and CTA head and neck cannot be done due to renal function.  EEG showed diffuse slowing.  Hospice has been consulted.      Staring spell with EEG showing background slowing.  May be related to hypoactive delirium.      Right basal ganglia ischemic stroke manifesting as left hemiparesis.  Ischemia already seen on CT brain.  Therefore not TNK candidate.  Subcortical stroke unlikely to be related to large vessel occlusion.  Most likely this is small vessel from severe hypertension (211/110)    -As hospice has been consulted, hold on any further testing (such as echo or carotid US; LDL/hemoglobin A1C).    Lacunar etiology of her stroke can be managed by optimizing small vessel risk factors (diabetes, hypertension and hyperlipidemia): aim for normotension, aspirin 81 mg daily and Lipitor 40 mg daily.   Anticoagulation for stroke in setting of atrial fibrillation - soonest would be 2 weeks from now (1/13/2025)     -Patient's son was also not interested in any further testing such as MRI brain    Please call neurology with further questions    This note was prepared using Dragon Medical voice recognition dictation software and as a result, errors may occur. When identified, these errors have been corrected. While every attempt is made to correct errors during dictation, discrepancies may still exist    NAVEED Mauro DO   Staff Neurologist   12/30/2024  1:09 PM                     [1]   No current facility-administered medications on file prior to encounter.     Current Outpatient  Medications on File Prior to Encounter   Medication Sig Dispense Refill    albuterol 108 (90 Base) MCG/ACT Inhalation Aero Soln Inhale 2 puffs into the lungs every 4 (four) hours as needed.      hydroCHLOROthiazide 25 MG Oral Tab Take 1 tablet (25 mg total) by mouth 2 (two) times daily.      amLODIPine 5 MG Oral Tab Take 1 tablet (5 mg total) by mouth daily.      atenolol 25 MG Oral Tab Take 1 tablet (25 mg total) by mouth daily.      docusate sodium 100 MG Oral Cap Take 100 mg by mouth daily as needed.     [2] No Known Allergies

## 2024-12-30 NOTE — SPIRITUAL CARE NOTE
Spiritual Care Visit Note    Patient Name: Amy Wagoner Date of Spiritual Care Visit: 24   : 1925 Primary Dx: Influenza A       Referred By: Referral From: Family;Nurse, Rosario Leader visit request    Spiritual Care Taxonomy:    Intended Effects: Aligning care plan with patient's values    Methods: Encouraging spiritual/Restorationism practices    Interventions: Connect someone with their rosario community/clergy;Communicate patient's needs/concerns to others    Visit Type/Summary:   received consult for rosario leader visit request from son for .  - Spiritual Care: Responded to a request for spiritual care and assessed the patient for spiritual care needs. Consulted with RN prior to visit. Offered empathic listening and emotional support. Provided support for Patient's spiritual/Restorationism requests. Contacted  Isidro to connect with family for visit.   remains available for follow up.    Spiritual Care support can be requested via an Epic consult. For urgent/immediate needs, please contact the On Call  at: [place]: ext [phone]    Chaplain Joey.

## 2024-12-30 NOTE — PROGRESS NOTES
RRT    *See RRT Documentation Record*    Reason the RRT was called: Unresponsive, pinpoint pupils, left side flaccid  Assessment of patient leading up to RRT: Groaning while being turned to be cleaned, TL came in with call from tele that patient was asystole, pulse and breathing noted, then patient became unresponsive and stopped following commands  Interventions/Testing: STAT CT head, CXR, BMP/CBC  Patient Outcome/Disposition: Waiting for neuro consult  Family Notified: yes  Name of family notified: Dr Kenneth Wagoner (son)

## 2024-12-30 NOTE — PLAN OF CARE
Problem: Patient Centered Care  Goal: Patient preferences are identified and integrated in the patient's plan of care  Description: Interventions:  - What would you like us to know as we care for you? From home alone   - Provide timely, complete, and accurate information to patient/family  - Incorporate patient and family knowledge, values, beliefs, and cultural backgrounds into the planning and delivery of care  - Encourage patient/family to participate in care and decision-making at the level they choose  - Honor patient and family perspectives and choices  Outcome: Progressing     Problem: Patient/Family Goals  Goal: Patient/Family Long Term Goal  Description: Patient's Long Term Goal: to return to baseline    Interventions:  - Monitor vital signs  - Monitor appropriate labs   - Pain management   - Administer medications per order   - Follow MD orders   - IV steroids  - Diagnostics per order   - Update/inform patient and family on plan of care   - Discharge planning   - See additional Care Plan goals for specific interventions  Outcome: Progressing  Goal: Patient/Family Short Term Goal  Description: Patient's Short Term Goal: to feel better    Interventions:   - Monitor vital signs  - Monitor appropriate labs   - Pain management   - Administer medications per order   - Follow MD orders   - IV steroids  - Diagnostics per order   - Update/inform patient and family on plan of care   - Discharge planning   - See additional Care Plan goals for specific interventions  Outcome: Progressing     Problem: PAIN - ADULT  Goal: Verbalizes/displays adequate comfort level or patient's stated pain goal  Description: INTERVENTIONS:  - Encourage pt to monitor pain and request assistance  - Assess pain using appropriate pain scale  - Administer analgesics based on type and severity of pain and evaluate response  - Implement non-pharmacological measures as appropriate and evaluate response  - Consider cultural and social  influences on pain and pain management  - Manage/alleviate anxiety  - Utilize distraction and/or relaxation techniques  - Monitor for opioid side effects  - Notify MD/LIP if interventions unsuccessful or patient reports new pain  - Anticipate increased pain with activity and pre-medicate as appropriate  Outcome: Progressing     Problem: RISK FOR INFECTION - ADULT  Goal: Absence of fever/infection during anticipated neutropenic period  Description: INTERVENTIONS  - Monitor WBC  - Administer growth factors as ordered  - Implement neutropenic guidelines  Outcome: Progressing     Problem: SAFETY ADULT - FALL  Goal: Free from fall injury  Description: INTERVENTIONS:  - Assess pt frequently for physical needs  - Identify cognitive and physical deficits and behaviors that affect risk of falls.  - [place] fall precautions as indicated by assessment.  - Educate pt/family on patient safety including physical limitations  - Instruct pt to call for assistance with activity based on assessment  - Modify environment to reduce risk of injury  - Provide assistive devices as appropriate  - Consider OT/PT consult to assist with strengthening/mobility  - Encourage toileting schedule  Outcome: Progressing     Problem: CARDIOVASCULAR - ADULT  Goal: Maintains optimal cardiac output and hemodynamic stability  Description: INTERVENTIONS:  - Monitor vital signs, rhythm, and trends  - Monitor for bleeding, hypotension and signs of decreased cardiac output  - Evaluate effectiveness of vasoactive medications to optimize hemodynamic stability  - Monitor arterial and/or venous puncture sites for bleeding and/or hematoma  - Assess quality of pulses, skin color and temperature  - Assess for signs of decreased coronary artery perfusion - ex. Angina  - Evaluate fluid balance, assess for edema, trend weights  Outcome: Progressing  Goal: Absence of cardiac arrhythmias or at baseline  Description: INTERVENTIONS:  - Continuous cardiac monitoring,  monitor vital signs, obtain 12 lead EKG if indicated  - Evaluate effectiveness of antiarrhythmic and heart rate control medications as ordered  - Initiate emergency measures for life threatening arrhythmias  - Monitor electrolytes and administer replacement therapy as ordered  Outcome: Progressing     Problem: RESPIRATORY - ADULT  Goal: Achieves optimal ventilation and oxygenation  Description: INTERVENTIONS:  - Assess for changes in respiratory status  - Assess for changes in mentation and behavior  - Position to facilitate oxygenation and minimize respiratory effort  - Oxygen supplementation based on oxygen saturation or ABGs  - Provide Smoking Cessation handout, if applicable  - Encourage broncho-pulmonary hygiene including cough, deep breathe, Incentive Spirometry  - Assess the need for suctioning and perform as needed  - Assess and instruct to report SOB or any respiratory difficulty  - Respiratory Therapy support as indicated  - Manage/alleviate anxiety  - Monitor for signs/symptoms of CO2 retention  Outcome: Progressing     Problem: SKIN/TISSUE INTEGRITY - ADULT  Goal: Skin integrity remains intact  Description: INTERVENTIONS  - Assess and document risk factors for pressure ulcer development  - Assess and document skin integrity  - Monitor for areas of redness and/or skin breakdown  - Initiate interventions, skin care algorithm/standards of care as needed  Outcome: Progressing  Goal: Incision(s), wounds(s) or drain site(s) healing without S/S of infection  Description: INTERVENTIONS:  - Assess and document risk factors for pressure ulcer development  - Assess and document skin integrity  - Assess and document dressing/incision, wound bed, drain sites and surrounding tissue  - Implement wound care per orders  - Initiate isolation precautions as appropriate  - Initiate Pressure Ulcer prevention bundle as indicated  Outcome: Progressing     Problem: HEMATOLOGIC - ADULT  Goal: Maintains hematologic  stability  Description: INTERVENTIONS  - Assess for signs and symptoms of bleeding or hemorrhage  - Monitor labs and vital signs for trends  - Administer supportive blood products/factors, fluids and medications as ordered and appropriate  - Administer supportive blood products/factors as ordered and appropriate  Outcome: Progressing  Goal: Free from bleeding injury  Description: (Example usage: patient with low platelets)  INTERVENTIONS:  - Avoid intramuscular injections, enemas and rectal medication administration  - Ensure safe mobilization of patient  - Hold pressure on venipuncture sites to achieve adequate hemostasis  - Assess for signs and symptoms of internal bleeding  - Monitor lab trends  - Patient is to report abnormal signs of bleeding to staff  - Avoid use of toothpicks and dental floss  - Use electric shaver for shaving  - Use soft bristle tooth brush  - Limit straining and forceful nose blowing  Outcome: Progressing     Problem: MUSCULOSKELETAL - ADULT  Goal: Return mobility to safest level of function  Description: INTERVENTIONS:  - Assess patient stability and activity tolerance for standing, transferring and ambulating w/ or w/o assistive devices  - Assist with transfers and ambulation using safe patient handling equipment as needed  - Ensure adequate protection for wounds/incisions during mobilization  - Obtain PT/OT consults as needed  - Advance activity as appropriate  - Communicate ordered activity level and limitations with patient/family  Outcome: Progressing

## 2024-12-30 NOTE — PROGRESS NOTES
12/30/24 0818   Wound 12/26/24 Sacrum   Date First Assessed/Time First Assessed: 12/26/24 2100   Present on Original Admission: Yes  Primary Wound Type: Pressure Injury  Location: Sacrum  Wound Description (Comments): stage 2 small sacral wound   Wound Image    Site Assessment Clean;Fragile;Granulation tissue;Moist;Red;Painful   Drainage Amount Scant   Drainage Description Serosanguineous   Treatments Cleansed;Topical (Barrier/Moisturizer/Ointment)   Dressing Aquacel Foam   Dressing Changed Changed   Dressing Status Dressing Changed;Removed;Old drainage   Wound Depth (cm) 0.1 cm   Alia-wound Assessment Clean;Intact;Fragile;Pink   Wound Granulation Tissue Red   State of Healing Fully granulated   Wound Odor None   Pressure Injury Stage 2   Wound 12/26/24 Heel Right   Date First Assessed/Time First Assessed: 12/26/24 2230   Present on Original Admission: Yes  Primary Wound Type: Pressure Injury  Location: Heel  Wound Location Orientation: Right  Wound Description (Comments): R heel pressure ulcer   Wound Image    Site Assessment Clean;Fragile;Granulation tissue;Moist;Painful;Red   Closure Not approximated   Drainage Amount Scant   Drainage Description Serosanguineous   Treatments Cleansed;Saline;Topical (Barrier/Moisturizer/Ointment)   Dressing Aquacel Foam;Xeroform   Dressing Changed Changed   Dressing Status Dressing Changed;Removed;Old drainage   Wound Depth (cm) 0.2 cm   Alia-wound Assessment Maceration;Moist;White   Wound Granulation Tissue Red   State of Healing Non-healing   Wound Odor None   Pressure Injury Stage 2   Wound 12/26/24 Heel Left   Date First Assessed/Time First Assessed: 12/26/24 2230   Present on Original Admission: Yes  Primary Wound Type: Pressure Injury  Location: Heel  Wound Location Orientation: Left  Wound Description (Comments): Left heel pressure injury   Wound Image    Site Assessment Clean;Fragile;Granulation tissue;Moist;Painful;Red   Closure Not approximated   Drainage Amount Scant    Drainage Description Serosanguineous   Treatments Cleansed;Saline   Dressing Aquacel Foam;Xeroform   Dressing Changed Changed   Dressing Status Dressing Changed;Removed;Old drainage   Wound Depth (cm) 0.1 cm   Alia-wound Assessment Clean;Intact;Fragile   Wound Granulation Tissue Red   State of Healing Fully granulated   Wound Odor None   Pressure Injury Stage 2   Wound Follow Up   Follow up needed No     Pt seen with bedside RN for wound assessment. See above for dressings applied. Pt became unresponsive as assessment finishing up. RRT called, Dr. Montague, respiratory, TL responded.

## 2024-12-30 NOTE — PROGRESS NOTES
[facility] - CARDIOLOGY PROGRESS NOTE  Amy Wagoner Patient Status:  Inpatient    1925 MRN [id number]   Location [facility]5W Attending Jane Montague,    Hosp Day # 4 PCP PHYSICIAN NONSTAFF     Assessment:    1.  Admitted with respiratory insufficiency.  Has tested positive for influenza A.    2.  Atrial fibrillation with slow ventricular response on admission.  Rate is improved with holding of atenolol.  A-fib this admission may be new.  Most recent EKG was from 2018 and showed sinus rhythm.    3.  Hypertension    4.  Acute mental status change this morning.  Stroke alert was called.  CT scan equivocal for acute stroke.    5.  CKD, stage IV      Plan:    Await neuro evaluation.  Would normally anticoagulate with heparin for acute stroke in the setting of atrial fibrillation.  Age, general debilitation, inability to take anticoagulation long-term would temper this decision.      Subjective:  No chest pain or shortness of breath.    Objective:  BP (!) 167/99 (BP Location: Left arm)   Pulse 67   Temp 97.3 °F (36.3 °C) (Axillary)   Resp 22   Ht 154.9 cm (5' 1\")   Wt 135 lb 12.8 oz (61.6 kg)   SpO2 100%   BMI 25.66 kg/m²     Temp (24hrs), Av °F (36.7 °C), Min:97.3 °F (36.3 °C), Max:98.8 °F (37.1 °C)      Intake/Output:    Intake/Output Summary (Last 24 hours) at 2024 1026  Last data filed at 2024 0849  Gross per 24 hour   Intake 10 ml   Output 1650 ml   Net -1640 ml       Wt Readings from Last 2 Encounters:   24 135 lb 12.8 oz (61.6 kg)   23 130 lb (59 kg)       Physical Exam:    General: Eyes open, nonverbal.  HEENT: No focal deficits.  Neck: No JVD, carotids 2+ no bruits.  Cardiac: Irregular rhythm, normal rate, S1, S2 normal, no murmur  Lungs: Coarse basilar rales bilaterally normal excursions and effort.  Abdomen: Soft, non-tender.   Extremities: Without  clubbing, cyanosis or edema.  Peripheral pulses are 2+.  Skin: Warm and dry.     Labs:  Lab Results   Component Value Date    WBC 5.8 12/30/2024    HGB 9.6 12/30/2024    HCT 29.6 12/30/2024    .0 12/30/2024     Lab Results   Component Value Date    INR 0.93 09/23/2018     Lab Results   Component Value Date     12/30/2024    K 4.1 12/30/2024     12/30/2024    CO2 23.0 12/30/2024    BUN 48 12/30/2024    CREATSERUM 2.09 12/30/2024    GLU 84 12/30/2024    CA 8.7 12/30/2024        No results found for: \"TROP\", \"CKMB\"     Medications:     methylPREDNISolone  20 mg Intravenous Q12H    oseltamivir  30 mg Oral Daily    amLODIPine  5 mg Oral Daily    [Held by provider] atenolol  25 mg Oral Daily    heparin  5,000 Units Subcutaneous 2 times per day         Denis Quiles MD  12/30/2024  10:26 AM

## 2024-12-30 NOTE — PROGRESS NOTES
[facility]  part of [facility]    Progress Note    Amy Wagoner Patient Status:  Inpatient    1925 MRN [id number]   Location [facility]5W Attending Jane Montague DO   Hosp Day # 4 PCP PHYSICIAN NONSTAFF     Chief complaint flu    Subjective:   Amy Wagoner is a(n) 89 year old female pt with staring spell. Found in asystole but staring off with left sided weakness. Now with nsr . Pt also hypertensive upon presentation, improved now.   Ct neg for bleed  Neuro called dr trevino to see   Could not do cta due to renal fx     Unable to do ros     Objective:   Blood pressure (!) 167/99, pulse 67, temperature 97.3 °F (36.3 °C), temperature source Axillary, resp. rate 22, height 5' 1\" (1.549 m), weight 135 lb 12.8 oz (61.6 kg), SpO2 100%.      Intake/Output Summary (Last 24 hours) at 2024 0914  Last data filed at 2024 0427  Gross per 24 hour   Intake 118 ml   Output 1450 ml   Net -1332 ml       Patient Weight(s) for the past 336 hrs:   Weight   24 2100 135 lb 12.8 oz (61.6 kg)   24 1603 130 lb 1.1 oz (59 kg)           General appearance: alert, appears stated age and cooperative  Pulmonary: no wheezing today, upper airway congestion   Cardiovascular: S1, S2 normal, no murmur, click, rub or gallop, regular rate and rhythm  Abdominal: soft, non-tender; bowel sounds normal; no masses,  no organomegaly  Extremities: extremities normal, atraumatic, no cyanosis or edema        Medicines:     Current Facility-Administered Medications   Medication Dose Route Frequency    methylPREDNISolone sodium succinate (Solu-MEDROL) injection 20 mg  20 mg Intravenous Q12H    metoclopramide (Reglan) 5 mg/mL injection 5 mg  5 mg Intravenous Q8H PRN    oseltamivir (Tamiflu) cap 30 mg  30 mg Oral Daily    amLODIPine (Norvasc) tab 5 mg  5 mg Oral Daily    [Held by provider] atenolol (Tenormin) tab 25 mg  25 mg Oral Daily    docusate sodium (Colace) cap 100 mg  100 mg Oral Daily PRN     heparin (Porcine) 5000 UNIT/ML injection 5,000 Units  5,000 Units Subcutaneous 2 times per day    acetaminophen (Tylenol Extra Strength) tab 500 mg  500 mg Oral Q4H PRN    ondansetron (Zofran) 4 MG/2ML injection 4 mg  4 mg Intravenous Q6H PRN    ipratropium-albuterol (Duoneb) 0.5-2.5 (3) MG/3ML inhalation solution 3 mL  3 mL Nebulization Q6H PRN                   Blood Pressure and Cardiac Medications            amLODIPine 5 MG Oral Tab    atenolol 25 MG Oral Tab                    Lab Results   Component Value Date    WBC 5.3 12/28/2024    HGB 8.6 (L) 12/28/2024    HCT 25.5 (L) 12/28/2024    .0 12/28/2024    CREATSERUM 2.20 (H) 12/28/2024    BUN 52 (H) 12/28/2024     12/28/2024    K 4.8 12/28/2024     12/28/2024    CO2 21.0 12/28/2024     (H) 12/28/2024    CA 8.8 12/28/2024    ALB 4.0 12/26/2024    ALKPHO 135 12/26/2024    BILT 0.2 12/26/2024    TP 7.5 12/26/2024    AST 21 12/26/2024    ALT 9 (L) 12/26/2024    INR 0.93 09/23/2018    T4F 0.8 12/27/2024    TSH 8.500 (H) 12/27/2024    LIP 71 (L) 06/10/2022     (H) 09/22/2018    ESRML 69 (H) 09/12/2022    MG 1.9 06/17/2022    PHOS 2.7 06/17/2022    B12 807 09/20/2018       CT STROKE BRAIN (NO IV)(CPT=70450)    Result Date: 12/30/2024  CONCLUSION:   Limited exam secondary to motion artifact.  1. Small area of hypoattenuation involving the right caudate nucleus and adjacent white matter, which reflects age-indeterminate ischemic change.  Recommend further evaluation with an MRI brain. 2. Scattered foci and patchy areas of hypoattenuation involving the periventricular and subcortical white matter, which are also age indeterminate but favored to reflect mild chronic microvascular ischemic changes.  3. No acute intracranial hemorrhage, midline shift, mass effect, or hydrocephalus. 4. Mild global parenchymal volume loss. 5. Acute on chronic left maxillary and right sphenoid sinusitis. 6. Lesser incidental findings described above.    Impression points 1-3 were communicated via telephone to Derick GORDILLO at 8:56 a.m. on 12/30/2024 by Guy Dunn MD    Dictated by (CST): Guy Dunn MD on 12/30/2024 at 8:49 AM     Finalized by (CST): Guy Dunn MD on 12/30/2024 at 8:58 AM         EKG 12 Lead    Result Date: 12/29/2024  Atrial fibrillation Right bundle branch block Abnormal ECG When compared with ECG of 26-DEC-2024 16:23, Atrial fibrillation has replaced Atrial flutter Vent. rate has decreased BY  30 BPM Nonspecific T wave abnormality has replaced inverted T waves in Inferior leads Confirmed by DO Redmond Asif ([id number]) on 12/29/2024 2:11:18 PM     Results:     CBC:    Lab Results   Component Value Date    WBC 5.3 12/28/2024    WBC 3.8 (L) 12/27/2024    WBC 4.9 12/26/2024     Lab Results   Component Value Date    HGB 8.6 (L) 12/28/2024    HGB 9.4 (L) 12/27/2024    HGB 9.8 (L) 12/26/2024      Lab Results   Component Value Date    .0 12/28/2024    .0 12/27/2024    .0 12/26/2024       Recent Labs   Lab 12/26/24  1609 12/27/24  0516 12/28/24  0503   GLU 70 72 114*   BUN 37* 37* 52*   CREATSERUM 2.33* 2.05* 2.20*   CA 9.7 9.0 8.8    138 139   K 5.2* 5.5* 4.8    108 108   CO2 25.0 22.0 21.0           Assessment and Plan:      ASSESSMENT:    1.Acute mental status changes 2/2 stroke or seizure   Ct reviewed possible new stroke   Cannot have cta due to creat   Dr trevino to see   Likely will need mri and eeg   Discussed with pt's son if it is stroke, would consider hospice vs pall care     2.       Influenza bronchitis with bronchospasm.  - now on nonrebreather. 70's upon presentation - repeat cxr and labs   - cont nebs   - cxr neg for pna   - wean oxygn     3.       Generalized weakness.  - pt/ot when improved     4.       Chronic kidney disease stage 4.  - creat near baseline. Bun elevated due to steroids     5.       Essential hypertension.  6. Heel pain - wound care to see for pressure ulcers noted on admit              Jane Montague DO         Chart reviewed, including current vitals, notes, labs and imaging  Labs ordered and medications adjusted as outlined above  Coordinate care with care team/consultants  Discussed with patient results of tests, management plan as outlined above, and the need for ongoing hospitalization  D/w RN     MDM high          PHYSICIAN Certification of Need for Inpatient Hospitalization - Initial Certification    Patient will require inpatient services that will reasonably be expected to span two midnight's based on the clinical documentation in H+P.   Based on patients current state of illness, I anticipate that, after discharge, patient will require TBD.        Supplementary Documentation:   DVT Mechanical Prophylaxis:        DVT Pharmacologic Prophylaxis   Medication    heparin (Porcine) 5000 UNIT/ML injection 5,000 Units                Code Status: DNAR/Selective Treatment  Foster: External urinary catheter in place  Foster Duration (in days):   Central line:    POLY: 12/30/2024        **Certification      PHYSICIAN Certification of Need for Inpatient Hospitalization - Initial Certification    Patient will require inpatient services that will reasonably be expected to span two midnight's based on the clinical documentation in H+P.   Based on patients current state of illness, I anticipate that, after discharge, patient will require TBD.

## 2024-12-30 NOTE — CM/SW NOTE
[facility] received phone call referral from TANIYA Lo. [facility] will follow up with family today to schedule informational hospice meeting.      5190 LSW and RN Noah met with pt's son Kenneth. Brief overview of hospice provided. Pt's son would like pt to have a PT consult before moving forward with hospice.  will follow up with pt and family tomorrow after pt is seen by PT.      Luis Levy, W  [facility]  [phone]

## 2024-12-30 NOTE — SPIRITUAL CARE NOTE
Spiritual Care Visit Note    Patient Name: Amy Wagoner Date of Spiritual Care Visit: 24   : 1925 Primary Dx: Influenza A       Referred By:  RRT    Spiritual Care Taxonomy:         Visit Type/Summary:     - Spiritual Care: Responded to a request via the on call phone Attempted visit. Patient is unavailable.  remains available as needed for follow up.    Spiritual Care support can be requested via an Epic consult. For urgent/immediate needs, please contact the On Call  at: [place]: ext [phone]    Chaplain Joey.

## 2024-12-31 PROBLEM — I63.9 ACUTE CVA (CEREBROVASCULAR ACCIDENT) (HCC): Status: ACTIVE | Noted: 2024-01-01

## 2024-12-31 NOTE — PROGRESS NOTES
[facility] - CARDIOLOGY PROGRESS NOTE  Amy Wagoner Patient Status:  Inpatient    1925 MRN [id number]   Location [facility]5W Attending Jane Montague,    Hosp Day # 5 PCP PHYSICIAN NONSTAFF     Assessment:    1.  Admitted with respiratory insufficiency.  Has tested positive for influenza A.    2.  Atrial fibrillation with slow ventricular response on admission.      3.  Hypertension    4.  Acute mental status change  with acute CVA.  Now unresponsive.     5.  CKD, stage IV      Plan:    Await discussions regarding comfort measures, will discuss with his son.      Subjective:  Unresponsive..    Objective:  BP (!) 154/98 (BP Location: Left arm)   Pulse 64   Temp 96.6 °F (35.9 °C) (Axillary)   Resp 22   Ht 154.9 cm (5' 1\")   Wt 135 lb 12.8 oz (61.6 kg)   SpO2 96%   BMI 25.66 kg/m²     Temp (24hrs), Av °F (36.1 °C), Min:96.6 °F (35.9 °C), Max:97.3 °F (36.3 °C)      Intake/Output:    Intake/Output Summary (Last 24 hours) at 2024 0753  Last data filed at 2024 0553  Gross per 24 hour   Intake 50 ml   Output 1050 ml   Net -1000 ml       Wt Readings from Last 2 Encounters:   24 135 lb 12.8 oz (61.6 kg)   23 130 lb (59 kg)       Physical Exam:    General: Eyes open, nonverbal.  HEENT: No focal deficits.  Neck: No JVD, carotids 2+ no bruits.  Cardiac: Irregular rhythm, normal rate, S1, S2 normal, no murmur  Lungs: Coarse basilar rales bilaterally normal excursions and effort.  Abdomen: Soft, non-tender.   Extremities: Without clubbing, cyanosis or edema.  Peripheral pulses are 2+.  Skin: Warm and dry.     Labs:  Lab Results   Component Value Date    WBC 5.8 2024    HGB 9.6 2024    HCT 29.6 2024    .0 2024     Lab Results   Component Value Date    INR 0.93 2018     Lab Results   Component Value Date     2024    K 4.1  12/30/2024     12/30/2024    CO2 23.0 12/30/2024    BUN 48 12/30/2024    CREATSERUM 2.09 12/30/2024    GLU 84 12/30/2024    CA 8.7 12/30/2024        No results found for: \"TROP\", \"CKMB\"     Medications:     aspirin  81 mg Oral Daily    atorvastatin  40 mg Oral Nightly    methylPREDNISolone  20 mg Intravenous Q12H    oseltamivir  30 mg Oral Daily    amLODIPine  5 mg Oral Daily    [Held by provider] atenolol  25 mg Oral Daily    heparin  5,000 Units Subcutaneous 2 times per day         Vandana

## 2024-12-31 NOTE — PHYSICAL THERAPY NOTE
PHYSICAL THERAPY RE-EVALUATION - INPATIENT   S/p R BG ischemic stroke/pending transition to Hospice (family requesting consult)    Room Number: 515/515-A  Evaluation Date: 12/31/2024  Type of Evaluation: Re-evaluation   Physician Order: PT Eval and Treat    Presenting Problem: weakness, cough  Co-Morbidities : Peripheral arterial disease status post right lower extremity peroneal and popliteal artery angioplasties, generalized osteoarthritis, osteoporosis, musculoskeletal deconditioning, chronic kidney disease stage 4, anemia of chronic kidney disease, degenerative joint disease of lumbar spine, gastroesophageal reflux disease, and hypertension.  Reason for Therapy: Mobility Dysfunction and Discharge Planning    PHYSICAL THERAPY ASSESSMENT   Patient is a 89 year old female admitted 12/26/2024 for R BG stroke.  Prior to admission, patient's baseline is dependent for mobility/self cares.  Patient is currently functioning below baseline with bed mobility, transfers, and maintaining seated position.    PLAN  Patient has been evaluated and presents with no skilled Physical Therapy needs at this time.  Patient will be discharged from Physical Therapy services. Please re-order if a new functional limitation presents during this admission.    PT Device Recommendation: Wheelchair;Gait belt;Hospital bed;Mechanical lift    PHYSICAL THERAPY MEDICAL/SOCIAL HISTORY   History related to current admission: The patient is a 89-year-old  female who was brought in today to the emergency department for evaluation of progressive weakness, cough, and shortness of breath with wheezing. CBC and chemistry unremarkable. GFR is 18 which is slightly below her baseline. Lactic acid 0.9. GeneXpert viral panel positive for influenza. Chest x-ray showed no acute findings.      Problem List  Principal Problem:    Influenza A  Active Problems:    Goals of care, counseling/discussion    Advanced care planning/counseling discussion    RUBI  (acute kidney injury) ([facility])    Weakness    Influenzal bronchitis    Palliative care by specialist    Advance care planning    Lacunar stroke ([facility])      HOME SITUATION  Type of Home: House  Home Layout: One level  Lives With: Alone;Caregiver part-time    Drives: No   Patient Regularly Uses: Hospital bed;Wheelchair      Prior Level of [place]: Prior to admission patient was dependent for stand pivot transfers to wheelchair and self cares.     SUBJECTIVE  Patient nonverbal during session, eyes closed, following no commands.    PHYSICAL THERAPY EXAMINATION   OBJECTIVE  Precautions: Bed/chair alarm  Fall Risk: High fall risk    PAIN ASSESSMENT  Rating: Unable to rate  Location: groaning with rolling activities  Management Techniques: Activity promotion;Repositioning;Relaxation    COGNITION  Overall Cognitive Status:  MIRNA - unable to assess  Arousal/Alertness:  unresponsive to stimuli  Orientation Level:  unable to assess  Following Commands:  does not follow commands    RANGE OF MOTION AND STRENGTH ASSESSMENT  Upper extremity ROM and strength are within functional limits passively.  Lower extremity ROM is within functional limits passively.  Lower extremity strength: unable to formally assess due to impaired cognition/command following.    BALANCE  Static Sitting: Fair -  Dynamic Sitting: Poor +  Static Standing: Poor  Dynamic Standing: Poor -    AM-PAC '6-Clicks' INPATIENT SHORT FORM - BASIC MOBILITY  How much difficulty does the patient currently have...  Patient Difficulty: Turning over in bed (including adjusting bedclothes, sheets and blankets)?: Unable   Patient Difficulty: Sitting down on and standing up from a chair with arms (e.g., wheelchair, bedside commode, etc.): Unable   Patient Difficulty: Moving from lying on back to sitting on the side of the bed?: Unable   How much help from another person does the patient currently need...   Help from Another: Moving to and from a bed to a chair (including a  wheelchair)?: Total   Help from Another: Need to walk in hospital room?: Total   Help from Another: Climbing 3-5 steps with a railing?: Total     AM-PAC Score:  Raw Score: 6   Approx Degree of Impairment: 100%   Standardized Score (AM-PAC Scale): 23.55   CMS Modifier (G-Code): CN    FUNCTIONAL ABILITY STATUS  Functional Mobility/Gait Assessment  Gait Assistance: Not tested  Rolling: dependent (maximal assistance x 2)  Supine to Sit:  not tested due to impaired cognition/command following  Sit to Supine:  not tested    Exercise/Education Provided:  Education Provided To: Family/Caregiver         Skilled Therapy Provided: Patient received supine in bed at initiation of session, family at bedside. Patient nonverbal, unresponsive to verbal/tactile stimuli, following no commands. Patient requires maximal assistance x 2 to roll/reposition in bed. Educated patient's family on patient performance, role of PT in the hospital and lack of functional goals for patient due to decreased alertness/participation. If patient returns home will require hospital bed, lorena lift and 24/7 care. Family agreeable to sign off from therapy at this time and will re-consult if new needs arise. Patient left in bed, lines intact, needs in reach and handoff to RN.      The patient's Approx Degree of Impairment: 100% has been calculated based on documentation in the [facility] '6 clicks' Inpatient Basic Mobility Short Form.  Research supports that patients with this level of impairment may benefit from LTC.  Final disposition will be made by interdisciplinary medical team.    Patient End of Session: In bed;Needs met;Call light within reach;RN aware of session/findings;All patient questions and concerns addressed;Alarm set    Patient was able to achieve the following ...   Patient able to transfer  At previous, functional level (dependent prior to admission)    Patient able to ambulate on level surfaces  Unable before admission     Patient Evaluation  Complexity Level:  History High - 3 or more personal factors and/or co-morbidities   Examination of body systems Moderate - addressing a total of 3 or more elements   Clinical Presentation  Moderate - Evolving   Clinical Decision Making  Moderate Complexity     Therapeutic Activity:  10 minutes    Sarah Tirado PT, DPT

## 2024-12-31 NOTE — SLP NOTE
ADULT SWALLOWING EVALUATION    ASSESSMENT    ASSESSMENT/OVERALL IMPRESSION:      Proper PPE worn. Hands sanitized upon entrance/exit Pt room.         BSE ordered 2/2 modified diet consistency (NPO).  Pt admitted 12/26/24 with influenza A. Pt \"almost bedbound\" at home with caregiver. PMH includes HTN, [place]. No PMH of dysphagia at [facility]. Currently, Pt NPO.      CXR 12/30/24:  CONCLUSION:    Trace bilateral pleural effusions with associated small bibasilar opacities, which may reflect atelectasis with or without superimposed pneumonia.       CT Brain 12/30/24:  CONCLUSION:    Limited exam secondary to motion artifact.      1. Small area of hypoattenuation involving the right caudate nucleus and adjacent white matter, which reflects age-indeterminate ischemic change.  Recommend further evaluation with an MRI brain.   2. Scattered foci and patchy areas of hypoattenuation involving the periventricular and subcortical white matter, which are also age indeterminate but favored to reflect mild chronic microvascular ischemic changes.    3. No acute intracranial hemorrhage, midline shift, mass effect, or hydrocephalus.   4. Mild global parenchymal volume loss.   5. Acute on chronic left maxillary and right sphenoid sinusitis.   6. Lesser incidental findings described above.       Pt lethargic, on 1L/min 02 NC, afebrile and assessed sitting upright in bed (after consulting with RN). Pt non-verbal during this assessment. No vocalizations noted. Pt unable to participate in oral motor exam secondary to reduced comprehension of task; did not follow any commands. Pt was fed tsp trial of moderately thick liquids. Pt with open mouth posture at rest and did not initiate a bilabial seal. No lingual bolus formation noted. Suspect partial spillover of material to posterior of oral cavity.     Pharyngeal response did not appear to trigger with no hyolaryngeal elevation to completion observed to palpation). Weak throat clear noted and  suspect from uncontrolled spillover of partial bolus into laryngeal vestibule. Oral residue of remaining moderately-thick liquids was removed via suction by SLP. No additional consistencies were presented; deemed unsafe. Overall, swallow function significantly weak with absent coordination/absent initiation of pharyngeal response. Sp02 ~96% during this assessment.        IMPRESSIONS:    Pt presents with severe oral dysphagia swallow and possible significant pharyngeal dysfunction. Collaborated with RN regarding Pt's swallowing plan of care. BSE results/recommendations discussed with Pt. Pt unable to comprehend at this time. RN to communicate results to family.          PLAN: Pt to remain NPO with RE-BSE 1/01/25 (PENDING OUTCOME OF HOSPICE MEETING). RN in agreement with plan of care. Oral care to be completed by nursing staff x3 daily.       RECOMMENDATIONS   Diet Recommendations - Solids: NPO  Diet Recommendations - Liquids: NPO     Medication Administration Recommendations: Non-oral  Treatment Plan/Recommendations: Aspiration precautions    HISTORY   MEDICAL HISTORY  Reason for Referral:  (NPO)    Problem List  Principal Problem:    Influenza A  Active Problems:    Goals of care, counseling/discussion    Advanced care planning/counseling discussion    RUBI (acute kidney injury) ([facility])    Weakness    Influenzal bronchitis    Palliative care by specialist    Advance care planning    Lacunar stroke ([facility])      Past Medical History  Past Medical History:    Essential hypertension    Hearing impairment    bilat Hearing aids    High blood pressure       Prior Living Situation:  (Home with caregiver)  Diet Prior to Admission: Unknown  Precautions: Aspiration    Patient/Family Goals: no feeding tubes    SWALLOWING HISTORY  Current Diet Consistency: NPO    OBJECTIVE   ORAL MOTOR EXAMINATION  Symmetry: Unable to assess  Strength: Unable to assess  Range of Motion: Unable to assess  Rate of Motion: Unable to assess    Voice  Quality:  (no verbalizations observed)  Respiratory Status: Supplemental O2;Nasal cannula  Consistencies Trialed: Honey thick liquids  Method of Presentation: Spoon;Staff/Clinician assistance  Patient Positioned: Midline;Upright    Oral Phase of Swallow: Impaired  Bolus Formation: Impaired  Bolus Propulsion: Impaired    Pharyngeal Phase of Swallow: Appears Impaired  Laryngeal Elevation Timing: Appears impaired  Laryngeal Elevation Strength: Appears impaired  Laryngeal Elevation Coordination: Appears impaired  (Please note: Silent aspiration cannot be evaluated clinically. Videofluoroscopic Swallow Study is required to rule-out silent aspiration.)      GOALS  Goal #1 Oral care to be completed x3 daily by nursing staff.      In Progress   Goal #2 RE-BSE to be completed.     As appropriate   FOLLOW UP  Treatment Plan/Recommendations: Aspiration precautions  Follow Up Needed (Documentation Required): No  SLP Follow-up Date: 12/31/24    Thank you for your referral.   If you have any questions, please contact   Chloe Giron M.S. [facility]/SLP  Speech-Language Pathologist  [facility]  #[phone]

## 2024-12-31 NOTE — PLAN OF CARE
Problem: Patient Centered Care  Goal: Patient preferences are identified and integrated in the patient's plan of care  Description: Interventions:  - What would you like us to know as we care for you? From home alone   - Provide timely, complete, and accurate information to patient/family  - Incorporate patient and family knowledge, values, beliefs, and cultural backgrounds into the planning and delivery of care  - Encourage patient/family to participate in care and decision-making at the level they choose  - Honor patient and family perspectives and choices  Outcome: Progressing     Problem: Patient/Family Goals  Goal: Patient/Family Long Term Goal  Description: Patient's Long Term Goal: to return to baseline    Interventions:  - Monitor vital signs  - Monitor appropriate labs   - Pain management   - Administer medications per order   - Follow MD orders   - IV steroids  - Diagnostics per order   - Update/inform patient and family on plan of care   - Discharge planning   - See additional Care Plan goals for specific interventions  Outcome: Progressing  Goal: Patient/Family Short Term Goal  Description: Patient's Short Term Goal: to feel better    Interventions:   - Monitor vital signs  - Monitor appropriate labs   - Pain management   - Administer medications per order   - Follow MD orders   - IV steroids  - Diagnostics per order   - Update/inform patient and family on plan of care   - Discharge planning   - See additional Care Plan goals for specific interventions  Outcome: Progressing     Problem: PAIN - ADULT  Goal: Verbalizes/displays adequate comfort level or patient's stated pain goal  Description: INTERVENTIONS:  - Encourage pt to monitor pain and request assistance  - Assess pain using appropriate pain scale  - Administer analgesics based on type and severity of pain and evaluate response  - Implement non-pharmacological measures as appropriate and evaluate response  - Consider cultural and social  influences on pain and pain management  - Manage/alleviate anxiety  - Utilize distraction and/or relaxation techniques  - Monitor for opioid side effects  - Notify MD/LIP if interventions unsuccessful or patient reports new pain  - Anticipate increased pain with activity and pre-medicate as appropriate  Outcome: Progressing     Problem: RISK FOR INFECTION - ADULT  Goal: Absence of fever/infection during anticipated neutropenic period  Description: INTERVENTIONS  - Monitor WBC  - Administer growth factors as ordered  - Implement neutropenic guidelines  Outcome: Progressing     Problem: SAFETY ADULT - FALL  Goal: Free from fall injury  Description: INTERVENTIONS:  - Assess pt frequently for physical needs  - Identify cognitive and physical deficits and behaviors that affect risk of falls.  - Tripp fall precautions as indicated by assessment.  - Educate pt/family on patient safety including physical limitations  - Instruct pt to call for assistance with activity based on assessment  - Modify environment to reduce risk of injury  - Provide assistive devices as appropriate  - Consider OT/PT consult to assist with strengthening/mobility  - Encourage toileting schedule  Outcome: Progressing     Problem: CARDIOVASCULAR - ADULT  Goal: Maintains optimal cardiac output and hemodynamic stability  Description: INTERVENTIONS:  - Monitor vital signs, rhythm, and trends  - Monitor for bleeding, hypotension and signs of decreased cardiac output  - Evaluate effectiveness of vasoactive medications to optimize hemodynamic stability  - Monitor arterial and/or venous puncture sites for bleeding and/or hematoma  - Assess quality of pulses, skin color and temperature  - Assess for signs of decreased coronary artery perfusion - ex. Angina  - Evaluate fluid balance, assess for edema, trend weights  Outcome: Progressing     Problem: RESPIRATORY - ADULT  Goal: Achieves optimal ventilation and oxygenation  Description: INTERVENTIONS:  -  Assess for changes in respiratory status  - Assess for changes in mentation and behavior  - Position to facilitate oxygenation and minimize respiratory effort  - Oxygen supplementation based on oxygen saturation or ABGs  - Provide Smoking Cessation handout, if applicable  - Encourage broncho-pulmonary hygiene including cough, deep breathe, Incentive Spirometry  - Assess the need for suctioning and perform as needed  - Assess and instruct to report SOB or any respiratory difficulty  - Respiratory Therapy support as indicated  - Manage/alleviate anxiety  - Monitor for signs/symptoms of CO2 retention  Outcome: Progressing     Problem: SKIN/TISSUE INTEGRITY - ADULT  Goal: Incision(s), wounds(s) or drain site(s) healing without S/S of infection  Description: INTERVENTIONS:  - Assess and document risk factors for pressure ulcer development  - Assess and document skin integrity  - Assess and document dressing/incision, wound bed, drain sites and surrounding tissue  - Implement wound care per orders  - Initiate isolation precautions as appropriate  - Initiate Pressure Ulcer prevention bundle as indicated  Outcome: Progressing     Problem: HEMATOLOGIC - ADULT  Goal: Maintains hematologic stability  Description: INTERVENTIONS  - Assess for signs and symptoms of bleeding or hemorrhage  - Monitor labs and vital signs for trends  - Administer supportive blood products/factors, fluids and medications as ordered and appropriate  - Administer supportive blood products/factors as ordered and appropriate  Outcome: Progressing  Goal: Free from bleeding injury  Description: (Example usage: patient with low platelets)  INTERVENTIONS:  - Avoid intramuscular injections, enemas and rectal medication administration  - Ensure safe mobilization of patient  - Hold pressure on venipuncture sites to achieve adequate hemostasis  - Assess for signs and symptoms of internal bleeding  - Monitor lab trends  - Patient is to report abnormal signs of  bleeding to staff  - Avoid use of toothpicks and dental floss  - Use electric shaver for shaving  - Use soft bristle tooth brush  - Limit straining and forceful nose blowing  Outcome: Progressing

## 2024-12-31 NOTE — CM/SW NOTE
Noah RN at [facility] notified CM that pt's family has agreed to inpatient hospice. CM will defer dc planning to hospice team at this time.    Guille Quarles, CATIEN    [phone]

## 2024-12-31 NOTE — OCCUPATIONAL THERAPY NOTE
OCCUPATIONAL THERAPY RE-EVALUATION - INPATIENT     Room Number: 515/515-A  Evaluation Date: 12/31/2024  Type of Evaluation: Re-evaluation  Presenting Problem: influenza, bronchitis, weakness, RUBI; right basal ganglia ischemic stroke (12/30)     Co-Morbidities: Peripheral arterial disease status post right lower extremity peroneal and popliteal artery angioplasties, generalized osteoarthritis, osteoporosis, musculoskeletal deconditioning, chronic kidney disease stage 4, anemia of chronic kidney disease, degenerative joint disease of lumbar spine, gastroesophageal reflux disease, and hypertension.     Physician Order: IP Consult to Occupational Therapy  Reason for Therapy: ADL/IADL Dysfunction and Discharge Planning    OCCUPATIONAL THERAPY ASSESSMENT   Patient is a 89 year old female admitted 12/26/2024 for influenza, bronchitis, weakness, RUBI; right basal ganglia ischemic stroke (12/30).  Prior to admission, patient's baseline is dependent for all ADLs and mostly bed bound.  Patient is currently functioning below baseline with ADLs, functional mobility, and functional transfers.     PLAN  Patient has been evaluated and presents with no skilled Occupational Therapy needs  at this time.  Patient will be discharged from Occupational Therapy services. Please re-order if a new functional limitation presents during this admission.    OT Device Recommendations:  (mechanical lift)    OCCUPATIONAL THERAPY MEDICAL/SOCIAL HISTORY   Problem List  Principal Problem:    Influenza A  Active Problems:    Goals of care, counseling/discussion    Advanced care planning/counseling discussion    [facility] (acute [facility]) ([facility])    Weakness    Influenzal bronchitis    Palliative care by specialist    Advance care planning    Lacunar stroke (HCC)    HOME SITUATION  Type of Home: House  Home Layout: One level  Lives With: Alone; Caregiver part-time  Occupation/Status: Retired MD  Drives: No  Patient Regularly Uses: Hospital bed;  Wheelchair    Prior Level of [place]: Prior to admission pt was dependent for all ADLs and mainly bed bound at baseline.     SUBJECTIVE  No verbal communication during session.     OCCUPATIONAL THERAPY EXAMINATION    OBJECTIVE  Precautions: Bed/chair alarm  Fall Risk: High fall risk       PAIN ASSESSMENT  Rating: Non-verbal signs of pain/discomfort observed (groaning)  Location: w/ rolling  Management Techniques: Relaxation; Repositioning    O2 SATURATIONS  Oxygen Therapy  SPO2% on Oxygen at Rest: 94  At rest oxygen flow (liters per minute): 1    COGNITION  Overall Cognitive Status:  MIRNA - unable to assess  Arousal/Alertness:  unresponsive to stimuli  Orientation Level:  unable to assess  Following Commands:  does not follow commands      RANGE OF MOTION   Upper extremity ROM is within functional limits passively. Unable to accurately assess AROM due to pt's impaired arousal/alertness and inability to follow commands.     STRENGTH ASSESSMENT  Unable to accurately assess due to pt's impaired arousal/alertness and inability to follow commands.     ACTIVITIES OF DAILY LIVING ASSESSMENT  AM-PAC ‘6-Clicks’ Inpatient Daily Activity Short Form  How much help from another person does the patient currently need…  -   Putting on and taking off regular lower body clothing?: Total  -   Bathing (including washing, rinsing, drying)?: Total  -   Toileting, which includes using toilet, bedpan or urinal? : Total  -   Putting on and taking off regular upper body clothing?: Total  -   Taking care of personal grooming such as brushing teeth?: A Lot  -   Eating meals?: A Lot    AM-PAC Score:  Score: 8  Approx Degree of Impairment: 85.69%  Standardized Score (AM-PAC Scale): 22.86  CMS Modifier (G-Code): CM       BED MOBILITY  Rolling: Maximum Assist (x2)      Skilled Therapy Provided:   RN cleared pt for participation. Session coordinated with PT. Pt received in bed with family present at bedside. Pt's family able to provide PLOF.  Pt nonverbal and unresponsive to verbal or tactile stimuli throughout session. Pt required max assist x2 to roll over R and L shoulders/reposition in bed. Ed pt's family on pt performance, role of OT, and lack of functional goals for pt due to decreased alertness/command following/participation. Family agreeable to sign-off from therapy at this time. Will re-consult if new needs arise.     EDUCATION PROVIDED  Patient Education : Role of Occupational Therapy; Plan of Care; Posture/Positioning; Proper Body Mechanics  Patient's Response to Education: Does Not Demonstrate Skills Needed for Learning  Family/Caregiver Education : Role of Occupational Therapy; Plan of Care  Family/Caregiver's Response to Education: Verbalized Understanding    The patient's Approx Degree of Impairment: 85.69% has been calculated based on documentation in the [facility] '6 clicks' Inpatient Daily Activity Short Form.  Research supports that patients with this level of impairment may benefit from LTC.  Final disposition will be made by interdisciplinary medical team.    Patient End of Session: In bed;Needs met;Call light within reach;Hospital anti-slip socks;All patient questions and concerns addressed;RN aware of session/findings;Alarm set      OT Session Time: 10 minutes  Self-Care Home Management: 10 minutes

## 2024-12-31 NOTE — PLAN OF CARE
Problem: Patient Centered Care  Goal: Patient preferences are identified and integrated in the patient's plan of care  Description: Interventions:  - What would you like us to know as we care for you? From home alone   - Provide timely, complete, and accurate information to patient/family  - Incorporate patient and family knowledge, values, beliefs, and cultural backgrounds into the planning and delivery of care  - Encourage patient/family to participate in care and decision-making at the level they choose  - Honor patient and family perspectives and choices  Outcome: Progressing     Problem: RISK FOR INFECTION - ADULT  Goal: Absence of fever/infection during anticipated neutropenic period  Description: INTERVENTIONS  - Monitor WBC  - Administer growth factors as ordered  - Implement neutropenic guidelines  Outcome: Progressing     Problem: SAFETY ADULT - FALL  Goal: Free from fall injury  Description: INTERVENTIONS:  - Assess pt frequently for physical needs  - Identify cognitive and physical deficits and behaviors that affect risk of falls.  - [place] fall precautions as indicated by assessment.  - Educate pt/family on patient safety including physical limitations  - Instruct pt to call for assistance with activity based on assessment  - Modify environment to reduce risk of injury  - Provide assistive devices as appropriate  - Consider OT/PT consult to assist with strengthening/mobility  - Encourage toileting schedule  Outcome: Progressing     Problem: CARDIOVASCULAR - ADULT  Goal: Maintains optimal cardiac output and hemodynamic stability  Description: INTERVENTIONS:  - Monitor vital signs, rhythm, and trends  - Monitor for bleeding, hypotension and signs of decreased cardiac output  - Evaluate effectiveness of vasoactive medications to optimize hemodynamic stability  - Monitor arterial and/or venous puncture sites for bleeding and/or hematoma  - Assess quality of pulses, skin color and temperature  - Assess  for signs of decreased coronary artery perfusion - ex. Angina  - Evaluate fluid balance, assess for edema, trend weights  Outcome: Progressing  Goal: Absence of cardiac arrhythmias or at baseline  Description: INTERVENTIONS:  - Continuous cardiac monitoring, monitor vital signs, obtain 12 lead EKG if indicated  - Evaluate effectiveness of antiarrhythmic and heart rate control medications as ordered  - Initiate emergency measures for life threatening arrhythmias  - Monitor electrolytes and administer replacement therapy as ordered  Outcome: Progressing     Problem: RESPIRATORY - ADULT  Goal: Achieves optimal ventilation and oxygenation  Description: INTERVENTIONS:  - Assess for changes in respiratory status  - Assess for changes in mentation and behavior  - Position to facilitate oxygenation and minimize respiratory effort  - Oxygen supplementation based on oxygen saturation or ABGs  - Provide Smoking Cessation handout, if applicable  - Encourage broncho-pulmonary hygiene including cough, deep breathe, Incentive Spirometry  - Assess the need for suctioning and perform as needed  - Assess and instruct to report SOB or any respiratory difficulty  - Respiratory Therapy support as indicated  - Manage/alleviate anxiety  - Monitor for signs/symptoms of CO2 retention  Outcome: Progressing     Problem: SKIN/TISSUE INTEGRITY - ADULT  Goal: Incision(s), wounds(s) or drain site(s) healing without S/S of infection  Description: INTERVENTIONS:  - Assess and document risk factors for pressure ulcer development  - Assess and document skin integrity  - Assess and document dressing/incision, wound bed, drain sites and surrounding tissue  - Implement wound care per orders  - Initiate isolation precautions as appropriate  - Initiate Pressure Ulcer prevention bundle as indicated  Outcome: Progressing     Problem: HEMATOLOGIC - ADULT  Goal: Maintains hematologic stability  Description: INTERVENTIONS  - Assess for signs and symptoms of  bleeding or hemorrhage  - Monitor labs and vital signs for trends  - Administer supportive blood products/factors, fluids and medications as ordered and appropriate  - Administer supportive blood products/factors as ordered and appropriate  Outcome: Progressing  Goal: Free from bleeding injury  Description: (Example usage: patient with low platelets)  INTERVENTIONS:  - Avoid intramuscular injections, enemas and rectal medication administration  - Ensure safe mobilization of patient  - Hold pressure on venipuncture sites to achieve adequate hemostasis  - Assess for signs and symptoms of internal bleeding  - Monitor lab trends  - Patient is to report abnormal signs of bleeding to staff  - Avoid use of toothpicks and dental floss  - Use electric shaver for shaving  - Use soft bristle tooth brush  - Limit straining and forceful nose blowing  Outcome: Progressing     Problem: MUSCULOSKELETAL - ADULT  Goal: Return mobility to safest level of function  Description: INTERVENTIONS:  - Assess patient stability and activity tolerance for standing, transferring and ambulating w/ or w/o assistive devices  - Assist with transfers and ambulation using safe patient handling equipment as needed  - Ensure adequate protection for wounds/incisions during mobilization  - Obtain PT/OT consults as needed  - Advance activity as appropriate  - Communicate ordered activity level and limitations with patient/family  Outcome: Progressing     Problem: Patient/Family Goals  Goal: Patient/Family Long Term Goal  Description: Patient's Long Term Goal: to return to baseline    Interventions:  - Monitor vital signs  - Monitor appropriate labs   - Pain management   - Administer medications per order   - Follow MD orders   - IV steroids  - Diagnostics per order   - Update/inform patient and family on plan of care   - Discharge planning   - See additional Care Plan goals for specific interventions  Outcome: Not Progressing  Goal: Patient/Family Short  Term Goal  Description: Patient's Short Term Goal: to feel better    Interventions:   - Monitor vital signs  - Monitor appropriate labs   - Pain management   - Administer medications per order   - Follow MD orders   - IV steroids  - Diagnostics per order   - Update/inform patient and family on plan of care   - Discharge planning   - See additional Care Plan goals for specific interventions  Outcome: Not Progressing     Problem: PAIN - ADULT  Goal: Verbalizes/displays adequate comfort level or patient's stated pain goal  Description: INTERVENTIONS:  - Encourage pt to monitor pain and request assistance  - Assess pain using appropriate pain scale  - Administer analgesics based on type and severity of pain and evaluate response  - Implement non-pharmacological measures as appropriate and evaluate response  - Consider cultural and social influences on pain and pain management  - Manage/alleviate anxiety  - Utilize distraction and/or relaxation techniques  - Monitor for opioid side effects  - Notify MD/LIP if interventions unsuccessful or patient reports new pain  - Anticipate increased pain with activity and pre-medicate as appropriate  Outcome: Not Progressing     Problem: SKIN/TISSUE INTEGRITY - ADULT  Goal: Skin integrity remains intact  Description: INTERVENTIONS  - Assess and document risk factors for pressure ulcer development  - Assess and document skin integrity  - Monitor for areas of redness and/or skin breakdown  - Initiate interventions, skin care algorithm/standards of care as needed  Outcome: Not Progressing

## 2024-12-31 NOTE — HOSPICE RN NOTE
[facility] RN admitted the patient to general inpatient hospice per Dr. Montague and Dr. Barnes. Patient is eligible for general inpatient hospice care for symptom management of dyspnea and airway secretions requiring frequent nursing assessments and interventions including administration of IV medications.     Consents for hospice signed by the patient's daughter, Jaky Mishra. The patient's son/POA, Kenneth Wagoner, deferred signing as he was unable to sign in person.  RN confirmed via telephone. The patient was unable to sign her own consents due to lethargy and disorientation.    Chart flipped.     Comfort order set placed. Scopolamine patch scheduled. PRN glycopyrrolate and morphine to be given IVP for symptom management per Radha Sepulveda RN.    Regular diet with pleasure feeding.    Aspiration and fall precautions in place.    PPS: 10%    POC discussed with the patient's family and Radha Sepulveda RN at bedside. All in agreement. [facility] to follow daily to support the patient and family.    CATIE TaylorN, RN, [facility]  [facility] RN Liaison  [phone] (After-hours)

## 2025-01-01 VITALS
RESPIRATION RATE: 15 BRPM | TEMPERATURE: 98 F | SYSTOLIC BLOOD PRESSURE: 84 MMHG | DIASTOLIC BLOOD PRESSURE: 45 MMHG | OXYGEN SATURATION: 90 % | HEART RATE: 56 BPM

## 2025-01-01 PROCEDURE — 99232 SBSQ HOSP IP/OBS MODERATE 35: CPT | Performed by: HOSPITALIST

## 2025-01-01 PROCEDURE — 99232 SBSQ HOSP IP/OBS MODERATE 35: CPT | Performed by: STUDENT IN AN ORGANIZED HEALTH CARE EDUCATION/TRAINING PROGRAM

## 2025-01-01 PROCEDURE — 99239 HOSP IP/OBS DSCHRG MGMT >30: CPT | Performed by: STUDENT IN AN ORGANIZED HEALTH CARE EDUCATION/TRAINING PROGRAM

## 2025-01-01 PROCEDURE — 99232 SBSQ HOSP IP/OBS MODERATE 35: CPT | Performed by: INTERNAL MEDICINE

## 2025-01-01 RX ORDER — MORPHINE SULFATE 2 MG/ML
1 INJECTION, SOLUTION INTRAMUSCULAR; INTRAVENOUS
Status: DISCONTINUED | OUTPATIENT
Start: 2025-01-01 | End: 2025-01-01

## 2025-01-01 RX ORDER — LORAZEPAM 2 MG/ML
2 CONCENTRATE ORAL EVERY 4 HOURS PRN
Status: DISCONTINUED | OUTPATIENT
Start: 2025-01-01 | End: 2025-01-01

## 2025-01-01 RX ORDER — MORPHINE SULFATE 20 MG/ML
10 SOLUTION ORAL
Status: DISCONTINUED | OUTPATIENT
Start: 2025-01-01 | End: 2025-01-01

## 2025-01-01 RX ORDER — MORPHINE SULFATE 2 MG/ML
2 INJECTION, SOLUTION INTRAMUSCULAR; INTRAVENOUS EVERY 4 HOURS PRN
Status: DISCONTINUED | OUTPATIENT
Start: 2025-01-01 | End: 2025-01-01

## 2025-01-01 RX ORDER — MORPHINE SULFATE 20 MG/ML
20 SOLUTION ORAL
Status: DISCONTINUED | OUTPATIENT
Start: 2025-01-01 | End: 2025-01-01

## 2025-01-01 RX ORDER — MORPHINE SULFATE 2 MG/ML
2 INJECTION, SOLUTION INTRAMUSCULAR; INTRAVENOUS
Status: DISCONTINUED | OUTPATIENT
Start: 2025-01-01 | End: 2025-01-01

## 2025-01-01 NOTE — PROGRESS NOTES
No changes noted during the shift. Vitals assessed, see MAR. Rounding preformed. Patient IP hospice, comfort measures followed. Morphine gtt infusing per orders. Family at bedside.

## 2025-01-01 NOTE — SPIRITUAL CARE NOTE
Spiritual Care Visit Note    Patient Name: Amy Wagoner Date of Spiritual Care Visit: 24   : 1925 Primary Dx: <principal problem not specified>       Referred By:      Spiritual Care Taxonomy:    Intended Effects: Demonstrate caring and concern    Methods: Collaborate with care team member    Interventions: Respond as  to a defined crisis event;Silent prayer;Discuss plan of care    Visit Type/Summary:     - Spiritual Care: Responded to a request via the on call phone Consulted with RN prior to visit. Attempted visit. Patient is unavailable. Left a Spiritual Care contact information card. Per RN, pt met with  .    Spiritual Care support can be requested via an Epic consult. For urgent/immediate needs, please contact the On Call  at: [place]: ext [phone]    Rev. Radha Santacruz MDiv

## 2025-01-01 NOTE — PROGRESS NOTES
[facility]  part of [facility]    Progress Note    Amy Wagoner Patient Status:  Inpatient    1925 MRN [id number]   Location [facility] Attending Jane Montague,    Hosp Day # 1 PCP PHYSICIAN NONSTAFF     Chief complaint flu    Subjective:   Amy Wagoner is a(n) 89 year old female pt nonverbal     Unable to do ros     Objective:   Blood pressure (!) 168/83, pulse 66, temperature (!) 96.4 °F (35.8 °C), temperature source Axillary, resp. rate 20, SpO2 94%.      Intake/Output Summary (Last 24 hours) at 2025 1613  Last data filed at 2025 1400  Gross per 24 hour   Intake 0 ml   Output --   Net 0 ml       No data found.          General appearance: nonverbal   Pulmonary: no wheezing today, upper airway congestion   Cardiovascular: S1, S2 normal, no murmur, click, rub or gallop, regular rate and rhythm  Abdominal: soft, non-tender; bowel sounds normal; no masses,  no organomegaly  Extremities: extremities normal, atraumatic, no cyanosis or edema        Medicines:     Current Facility-Administered Medications   Medication Dose Route Frequency    ipratropium-albuterol (Duoneb) 0.5-2.5 (3) MG/3ML inhalation solution 3 mL  3 mL Nebulization Q6H PRN    sodium chloride 0.9% 0.9% flush injection 10 mL  10 mL Intravenous PRN    morphINE PF 2 MG/ML injection 1 mg  1 mg Intravenous Q1H PRN    morphINE in sodium chloride 0.9% 100 mg/100mL infusion premix  1-10 mg/hr Intravenous Continuous PRN    LORazepam (Ativan) 2 mg/mL injection 0.5 mg  0.5 mg Intravenous Q4H PRN    Or    LORazepam (Ativan) 2 mg/mL injection 1 mg  1 mg Intravenous Q4H PRN    Or    LORazepam (Ativan) 2 mg/mL injection 2 mg  2 mg Intravenous Q4H PRN    scopolamine (Transderm-Scop) 1 MG/3DAYS patch 1 patch  1 patch Transdermal Q72H    atropine (Atropine-Care) 1 % ophthalmic solution 2 drop  2 drop Oral Q2H PRN    furosemide (Lasix) 10 mg/mL injection 40 mg  40 mg Intravenous Q8H PRN    glycopyrrolate (Robinul)  0.2 MG/ML injection 0.4 mg  0.4 mg Intravenous Q3H PRN    bisacodyl (Dulcolax) 10 MG rectal suppository 10 mg  10 mg Rectal Daily PRN    fleet enema (Fleet) rectal enema 133 mL  1 enema Rectal Once PRN    ondansetron (Zofran) 4 MG/2ML injection 4 mg  4 mg Intravenous Q6H PRN    glycerin-hypromellose- (Artificial Tears) 0.2-0.2-1 % ophthalmic solution 2 drop  2 drop Both Eyes Q1H PRN                   Blood Pressure and Cardiac Medications            amLODIPine 5 MG Oral Tab    atenolol 25 MG Oral Tab             morphINE in sodium chloride 0.9% 1 mg/hr (01/01/25 1322)           Lab Results   Component Value Date    WBC 6.0 12/31/2024    HGB 9.3 (L) 12/31/2024    HCT 30.4 (L) 12/31/2024    .0 12/31/2024    CREATSERUM 1.91 (H) 12/31/2024    BUN 44 (H) 12/31/2024     12/31/2024    K 4.5 12/31/2024     12/31/2024    CO2 22.0 12/31/2024    GLU 89 12/31/2024    CA 8.9 12/31/2024    ALB 4.0 12/26/2024    ALKPHO 135 12/26/2024    BILT 0.2 12/26/2024    TP 7.5 12/26/2024    AST 21 12/26/2024    ALT 9 (L) 12/26/2024    INR 0.93 09/23/2018    T4F 0.8 12/27/2024    TSH 8.500 (H) 12/27/2024    LIP 71 (L) 06/10/2022     (H) 09/22/2018    ESRML 69 (H) 09/12/2022    MG 1.9 06/17/2022    PHOS 2.7 06/17/2022    B12 807 09/20/2018       No results found.        Results:     CBC:    Lab Results   Component Value Date    WBC 6.0 12/31/2024    WBC 5.8 12/30/2024    WBC 5.3 12/28/2024     Lab Results   Component Value Date    HGB 9.3 (L) 12/31/2024    HGB 9.6 (L) 12/30/2024    HGB 8.6 (L) 12/28/2024      Lab Results   Component Value Date    .0 12/31/2024    .0 12/30/2024    .0 12/28/2024       Recent Labs   Lab 12/28/24  0503 12/30/24  0922 12/31/24  1352   * 84 89   BUN 52* 48* 44*   CREATSERUM 2.20* 2.09* 1.91*   CA 8.8 8.7 8.9    143 144   K 4.8 4.1 4.5    113* 112   CO2 21.0 23.0 22.0           Assessment and Plan:      ASSESSMENT:    1.Acute mental status  changes 2/2 stroke   Ct reviewed possible new stroke   Cannot have cta due to creat   Dr trevino - apprec input   Plan for hospice. Cont morphine for comfort     2.       Influenza bronchitis with bronchospasm.  Hospice as above   Pt appears comfortable     3.       Generalized weakness.  - pt/ot     4.       Chronic kidney disease stage 4.  - creat near baseline. Bun elevated due to steroids     5.       Essential hypertension.  6. Heel pain - wound care          Transfer to hospice     Jane Montague,          Chart reviewed, including current vitals, notes, labs and imaging  Labs ordered and medications adjusted as outlined above  Coordinate care with care team/consultants  Discussed with patient results of tests, management plan as outlined above, and the need for ongoing hospitalization  D/w RN     [facility] high          PHYSICIAN Certification of Need for Inpatient Hospitalization - Initial Certification    Patient will require inpatient services that will reasonably be expected to span two midnight's based on the clinical documentation in H+P.   Based on patients current state of illness, I anticipate that, after discharge, patient will require TBD.        Supplementary Documentation:   DVT Mechanical Prophylaxis:        DVT Pharmacologic Prophylaxis   Medication   None                Code Status: DNAR/Comfort Care  Foster: External urinary catheter in place  Foster Duration (in days):   Central line:    POLY:         **Certification      PHYSICIAN Certification of Need for Inpatient Hospitalization - Initial Certification    Patient will require inpatient services that will reasonably be expected to span two midnight's based on the clinical documentation in H+P.   Based on patients current state of illness, I anticipate that, after discharge, patient will require TBD.

## 2025-01-01 NOTE — HOSPICE RN NOTE
[facility] Inpatient Nursing Rounds:     Assessed patient at bedside, family present, patient is unresponsive to verbal and tactile stimulus. Look of surprise/terror with raised eyebrows, eyes open without eye contact, occasional fidgeting noted to left shoulder and family reports that she looks \"uncomfortable and in pain\". RR 20, shallow, labored with accessory muscle use, occasional wet cough with white/tan sputum, audible congestion and rhonchi breath sounds. 1LNC supplemental oxygen in use with open mouth breathing. Scopolamine patch in place. Skin pale and cool with pitting edema noted to left upper extremity and weak bilateral pedal pulses.   LBM: 12/31. Bowel sounds hypoactive in all quadrants. Bed confined. PPS: 10 %    Spoke with floor RAND Huerta regarding available prn dose of IVP Lasix, IVP Morphine and initiation of Morphine gtt to help ease labored breathing and uncontrolled pain. RN in agreement with this plan. In the last 24 hours PRN medications given include: IV Morphine, 1mg x4, IV Lorazepam, 2mg x1 and IV Robinul, 0.4 mg x2.    Patient remains eligible for general inpatient hospice care for symptom management of uncontrolled pain, ongoing dyspnea and excessive airway secretions requiring frequent nursing assessments and interventions including titration of IV medications per Dr. Jane Montague and Dr. Jaylen Barnes. POC discussed with family and RAND Huerta. All in agreement. [facility] will continue to follow this patient closely for end of life while supporting patient and family.    *1850* Hospice RN Second Rounds: POC to address dyspnea, agitation and congestion reviewed with family and with RAND Huerta who are in agreement with IVP 2mg Ativan dose now, and Robinul 0.4 IVP. Morphine gtt running at 1mg/hr. Wet cough, congestion, fidgeting in bed attempting to raise torso and movement of bilateral hands and feet seen. Education and emotional support provided to family  at bedside.     Hannah Mccabe RN, BSN-CHPN  Transitional Nurse Liaison  [facility]  [phone] (After-hours)

## 2025-01-01 NOTE — CM/SW NOTE
LSW visit to pt and family. PT was lying in bed open mouth breathing. Pt's dtr Jaky and family friends were present at bedside. LSW provided emotional support through active listening and validation. LSW will continue to follow up with pt and family to offer comfort and support.      Luis Levy, PARAMJIT  [facility]  [phone]

## 2025-01-01 NOTE — PROGRESS NOTES
No acute changes overnight, vital signs being monitored, frequent rounding by nursing staff, appropriate safety precautions in place, call light within reach, repositioning provided as tolerated, prn provided for dyspnea.

## 2025-01-01 NOTE — PROGRESS NOTES
[facility]  part of [facility]    Progress Note    Amy Wagoner Patient Status:  Inpatient    1925 MRN [id number]   [facility] Attending Jane Montague DO   Hosp Day # 0 PCP PHYSICIAN NONSTAFF     Chief complaint flu    Subjective:   Amy Wagoner is a(n) 89 year old female pt nonverbal     Unable to do ros     Objective:   Pulse 69.      Intake/Output Summary (Last 24 hours) at 2024 1903  Last data filed at 2024 1647  Gross per 24 hour   Intake 0 ml   Output --   Net 0 ml       No data found.          General appearance: nonverbal   Pulmonary: no wheezing today, upper airway congestion   Cardiovascular: S1, S2 normal, no murmur, click, rub or gallop, regular rate and rhythm  Abdominal: soft, non-tender; bowel sounds normal; no masses,  no organomegaly  Extremities: extremities normal, atraumatic, no cyanosis or edema        Medicines:     Current Facility-Administered Medications   Medication Dose Route Frequency    ipratropium-albuterol (Duoneb) 0.5-2.5 (3) MG/3ML inhalation solution 3 mL  3 mL Nebulization Q6H PRN    sodium chloride 0.9% 0.9% flush injection 10 mL  10 mL Intravenous PRN    morphINE PF 2 MG/ML injection 1 mg  1 mg Intravenous Q1H PRN    morphINE in sodium chloride 0.9% 100 mg/100mL infusion premix  1-10 mg/hr Intravenous Continuous PRN    LORazepam (Ativan) 2 mg/mL injection 0.5 mg  0.5 mg Intravenous Q4H PRN    Or    LORazepam (Ativan) 2 mg/mL injection 1 mg  1 mg Intravenous Q4H PRN    Or    LORazepam (Ativan) 2 mg/mL injection 2 mg  2 mg Intravenous Q4H PRN    scopolamine (Transderm-Scop) 1 MG/3DAYS patch 1 patch  1 patch Transdermal Q72H    atropine (Atropine-Care) 1 % ophthalmic solution 2 drop  2 drop Oral Q2H PRN    furosemide (Lasix) 10 mg/mL injection 40 mg  40 mg Intravenous Q8H PRN    glycopyrrolate (Robinul) 0.2 MG/ML injection 0.4 mg  0.4 mg Intravenous Q3H PRN    bisacodyl (Dulcolax) 10 MG rectal suppository 10 mg   10 mg Rectal Daily PRN    fleet enema (Fleet) rectal enema 133 mL  1 enema Rectal Once PRN    ondansetron (Zofran) 4 MG/2ML injection 4 mg  4 mg Intravenous Q6H PRN    glycerin-hypromellose- (Artificial Tears) 0.2-0.2-1 % ophthalmic solution 2 drop  2 drop Both Eyes Q1H PRN                   Blood Pressure and Cardiac Medications            amLODIPine 5 MG Oral Tab    atenolol 25 MG Oral Tab             morphINE in sodium chloride 0.9%             Lab Results   Component Value Date    WBC 6.0 12/31/2024    HGB 9.3 (L) 12/31/2024    HCT 30.4 (L) 12/31/2024    .0 12/31/2024    CREATSERUM 1.91 (H) 12/31/2024    BUN 44 (H) 12/31/2024     12/31/2024    K 4.5 12/31/2024     12/31/2024    CO2 22.0 12/31/2024    GLU 89 12/31/2024    CA 8.9 12/31/2024    ALB 4.0 12/26/2024    ALKPHO 135 12/26/2024    BILT 0.2 12/26/2024    TP 7.5 12/26/2024    AST 21 12/26/2024    ALT 9 (L) 12/26/2024    INR 0.93 09/23/2018    T4F 0.8 12/27/2024    TSH 8.500 (H) 12/27/2024    LIP 71 (L) 06/10/2022     (H) 09/22/2018    ESRML 69 (H) 09/12/2022    MG 1.9 06/17/2022    PHOS 2.7 06/17/2022    B12 807 09/20/2018       XR CHEST AP PORTABLE  (CPT=71045)    Result Date: 12/30/2024  CONCLUSION:   Trace bilateral pleural effusions with associated small bibasilar opacities, which may reflect atelectasis with or without superimposed pneumonia.    Dictated by (CST): Guy Dunn MD on 12/30/2024 at 11:04 AM     Finalized by (CST): Guy Dunn MD on 12/30/2024 at 11:05 AM          CT STROKE BRAIN (NO IV)(CPT=70450)    Result Date: 12/30/2024  CONCLUSION:   Limited exam secondary to motion artifact.  1. Small area of hypoattenuation involving the right caudate nucleus and adjacent white matter, which reflects age-indeterminate ischemic change.  Recommend further evaluation with an MRI brain. 2. Scattered foci and patchy areas of hypoattenuation involving the periventricular and subcortical white matter, which are  also age indeterminate but favored to reflect mild chronic microvascular ischemic changes.  3. No acute intracranial hemorrhage, midline shift, mass effect, or hydrocephalus. 4. Mild global parenchymal volume loss. 5. Acute on chronic left maxillary and right sphenoid sinusitis. 6. Lesser incidental findings described above.   Impression points 1-3 were communicated via telephone to Derick GORDILLO at 8:56 a.m. on 12/30/2024 by Guy Dunn MD    Dictated by (CST): Guy Dunn MD on 12/30/2024 at 8:49 AM     Finalized by (CST): Guy Dunn MD on 12/30/2024 at 8:58 AM               Results:     CBC:    Lab Results   Component Value Date    WBC 6.0 12/31/2024    WBC 5.8 12/30/2024    WBC 5.3 12/28/2024     Lab Results   Component Value Date    HGB 9.3 (L) 12/31/2024    HGB 9.6 (L) 12/30/2024    HGB 8.6 (L) 12/28/2024      Lab Results   Component Value Date    .0 12/31/2024    .0 12/30/2024    .0 12/28/2024       Recent Labs   Lab 12/28/24  0503 12/30/24  0922 12/31/24  1352   * 84 89   BUN 52* 48* 44*   CREATSERUM 2.20* 2.09* 1.91*   CA 8.8 8.7 8.9    143 144   K 4.8 4.1 4.5    113* 112   CO2 21.0 23.0 22.0           Assessment and Plan:      ASSESSMENT:    1.Acute mental status changes 2/2 stroke   Ct reviewed possible new stroke   Cannot have cta due to creat   Dr trevino - apprec input   Plan for hospice.   2.       Influenza bronchitis with bronchospasm.  - now on nonrebreather. 70's upon presentation - repeat cxr and labs   - cont nebs   - cxr neg for pna   - o 2 improved      3.       Generalized weakness.  - pt/ot     4.       Chronic kidney disease stage 4.  - creat near baseline. Bun elevated due to steroids     5.       Essential hypertension.  6. Heel pain - wound care          Transfer to hospice     Jane Montague DO         Chart reviewed, including current vitals, notes, labs and imaging  Labs ordered and medications adjusted as outlined above  Coordinate care  with care team/consultants  Discussed with patient results of tests, management plan as outlined above, and the need for ongoing hospitalization  D/w RN     MDM high          PHYSICIAN Certification of Need for Inpatient Hospitalization - Initial Certification    Patient will require inpatient services that will reasonably be expected to span two midnight's based on the clinical documentation in H+P.   Based on patients current state of illness, I anticipate that, after discharge, patient will require TBD.        Supplementary Documentation:   DVT Mechanical Prophylaxis:        DVT Pharmacologic Prophylaxis   Medication   None                Code Status: DNAR/Comfort Care  Foster: External urinary catheter in place  Foster Duration (in days):   Central line:    POLY:         **Certification      PHYSICIAN Certification of Need for Inpatient Hospitalization - Initial Certification    Patient will require inpatient services that will reasonably be expected to span two midnight's based on the clinical documentation in H+P.   Based on patients current state of illness, I anticipate that, after discharge, patient will require TBD.

## 2025-01-02 NOTE — PROGRESS NOTES
No acute changes. Pt seemingly comfortable throughout shift. Morphine gtt increased to 2mg/hr due to some twitching noticed by children at bedside. Blood pressure holding, pt still urinating. Latest respiratory rate of 12.

## 2025-01-02 NOTE — PROGRESS NOTES
[facility]  part of [facility]     Hospitalist Progress Note     Amy Wagoner Patient Status:  Inpatient    1925 MRN [id number]   Location [facility]5W Attending Mohit Ng MD   Hosp Day # 2 PCP PHYSICIAN NONSTAFF     Chief Complaint: Hospice    Subjective:     Patient seen lying in bed.  No family at bedside.  Patient remains lethargic, but appears comfortable.    Objective:      Vital signs:  Vitals:    25 0820 25 1219 25 1919 25 0828   BP: (!) 168/83  106/67 107/69   BP Location: Left arm  Left arm Left arm   Pulse: 66  68 79   Resp: 20 20 18 16   Temp:       TempSrc:   Axillary    SpO2: 94%  90% (!) 87%       Intake/Output Summary (Last 24 hours) at 2025 0953  Last data filed at 2025 0830  Gross per 24 hour   Intake 17.6 ml   Output 600 ml   Net -582.4 ml           Physical Exam:    GENERAL: Frail and ill-appearing female.  Lethargic/somnolent.  in no acute distress.  HEART:  Regular rhythm, regular rate  LUNGS:  No increased work of breathing       Diagnostic Data:    Labs:    Recent Labs   Lab 24  0503 24  0922 24  1352   WBC 5.3 5.8 6.0   HGB 8.6* 9.6* 9.3*   MCV 89.5 91.1 91.8   .0 259.0 270.0       Recent Labs   Lab 24  1609 24  0516 24  0503 24  0922 24  1352   GLU 70   < > 114* 84 89   BUN 37*   < > 52* 48* 44*   CREATSERUM 2.33*   < > 2.20* 2.09* 1.91*   CA 9.7   < > 8.8 8.7 8.9   ALB 4.0  --   --   --   --       < > 139 143 144   K 5.2*   < > 4.8 4.1 4.5      < > 108 113* 112   CO2 25.0   < > 21.0 23.0 22.0   ALKPHO 135  --   --   --   --    AST 21  --   --   --   --    ALT 9*  --   --   --   --    BILT 0.2  --   --   --   --    TP 7.5  --   --   --   --     < > = values in this interval not displayed.           CrCl cannot be calculated (Unknown ideal weight.).    No results for input(s): \"PTP\", \"INR\" in the last 168 hours.         COVID-19  Lab Results    Component Value Date    COVID19 Not Detected 12/26/2024    COVID19 Not Detected 09/12/2022    COVID19 Not Detected 06/15/2022       Pro-Calcitonin  No results for input(s): \"PCT\" in the last 168 hours.    Cardiac  No results for input(s): \"TROP\", \"PBNP\" in the last 168 hours.    Inflammatory Markers  No results for input(s): \"CRP\", \"PRABHJOT\", \"LDH\", \"DDIMER\" in the last 168 hours.    Culture:  No results found for this visit on 12/31/24.    XR CHEST AP PORTABLE  (CPT=71045)    Result Date: 12/30/2024  CONCLUSION:   Trace bilateral pleural effusions with associated small bibasilar opacities, which may reflect atelectasis with or without superimposed pneumonia.    Dictated by (CST): Guy Dunn MD on 12/30/2024 at 11:04 AM     Finalized by (CST): Guy Dunn MD on 12/30/2024 at 11:05 AM                 Medications:    scopolamine  1 patch Transdermal Q72H       Assessment & Plan:        Acute CVA (cerebrovascular accident) (HCC)  Influenza bronchitis with bronchospasm   Hospice care  -Patient appears comfortable  -Continue scopolamine patch for secretions  -Continue morphine for pain and air hunger  -Continue Ativan as needed for agitation         Discussed management/test result(s) with Rn     Quality:    CODE status: DNR/Comfort  Estimated date of discharge: TBD  Discharge is dependent on: clinical stability    Mohit Ng MD          This note was prepared using Dragon Medical voice recognition dictation software. As a result errors may occur. When identified these errors have been corrected. While every attempt is made to correct errors during dictation discrepancies may still exist

## 2025-01-02 NOTE — PROGRESS NOTES
No acute changes overnight, vital signs being monitored, frequent rounding by nursing staff, appropriate safety precautions in place, call light within reach, morphine gtt infusing.

## 2025-01-03 NOTE — HOSPICE RN NOTE
[facility] Inpatient Nursing Rounds:     RN, LSW, and  visited patient and family at bedside. Patient remains unresponsive. Furrowed brow with other facial features relaxed. Open mouth breathing, labored, shallow, irregular, RR 16, diminished in all fields with light rhonchi bilaterally, on 1 L/min O2 NC. Skin pale, warm, moist. Mottling to bilateral distal feet. Urine occurrence x1, isidro/malodorous, during care per Aida Smith RN. LBM: 1/2/2025. Bowel sounds active in all quadrants.     Morphine infusion at 1 mg/hr for dyspnea, to be increased to 2 mg/hr. In past 24 hours, PRN medications given include: furosemide 40 mg IVP x1, glycopyrrolate 0.4 mg IVP x1, lorazepam 2 mg IVP x1, morphine 1 mg IVP x1.     PPS: 10%    Patient remains eligible for general inpatient hospice care for symptom management of pain, dyspnea, agitation, and airway secretions requiring frequent nursing assessments and interventions including administration and titration of IV medications per Dr. Ng and Dr. Barnes. POC discussed with the patient's family and Aida Smith RN at bedside. All in agreement. [facility] will continue to support the patient and family.    1821: Morphine infusion increased to 2 mg/hr for increased dyspnea.    CATIE TaylorN, RN, PN  [facility] RN Liaison  [phone] (After-hours)

## 2025-01-03 NOTE — PROGRESS NOTES
[facility]  part of [facility]    Progress Note    Amy Wagoner Patient Status:  Inpatient    1925 MRN [id number]   Location [facility]5W Attending Janny Monaco MD   Hosp Day # 3 PCP PHYSICIAN NONSTAFF     Chief Complaint:     hospice    Subjective:   Subjective:    Patient seen  Hypotensive and hypothermic  Hospice care    Objective:   Blood pressure 95/55, pulse 70, temperature (!) 96.2 °F (35.7 °C), temperature source Axillary, resp. rate 18, SpO2 (!) 85%.  Physical Exam    General: Frail and ill-appearing lethargic   Cardiac: S1-S2 appreciated  Lungs: Good air entry bilaterally Abdomen: Soft nontender nondistended positive bowel sounds      Results:   Lab Results   Component Value Date    WBC 6.0 2024    HGB 9.3 (L) 2024    HCT 30.4 (L) 2024    .0 2024    CREATSERUM 1.91 (H) 2024    BUN 44 (H) 2024     2024    K 4.5 2024     2024    CO2 22.0 2024    GLU 89 2024    CA 8.9 2024    ALB 4.0 2024    ALKPHO 135 2024    BILT 0.2 2024    TP 7.5 2024    AST 21 2024    ALT 9 (L) 2024    INR 0.93 2018    T4F 0.8 2024    TSH 8.500 (H) 2024    LIP 71 (L) 06/10/2022     (H) 2018    ESRML 69 (H) 2022    MG 1.9 2022    PHOS 2.7 2022    TROPHS 10 06/10/2022    B12 807 2018       No results found.        Assessment & Plan:       Acute CVA (cerebrovascular accident) (HCC)  Influenza bronchitis with bronchospasm   Hospice care  -Patient appears comfortable  -Continue scopolamine patch for secretions  -Continue morphine for pain and air hunger  -Continue Ativan as needed for agitation        Discussed management/test result(s) with Rn      Quality:     CODE status: DNR/Comfort  Estimated date of discharge: TBD  Discharge is dependent on: clinical stability    Global A/P  -Continue management per  hospice      Janny Monaco MD

## 2025-01-03 NOTE — HOSPICE RN NOTE
[facility] Inpatient Nursing Rounds:     Hospice RN and LSW visited patient at bedside. Patient's son and DIL present. Patient remains unresponsive. Open mouth breathing. RR 12 and shallow. 1L NC. Lungs sound diminished. Skin is pale, warm, dusky nailbeds, and circumoral cyanosis. Mottling to both hands/feet. Wound care per hospital RN. Purewick intact. LBM: 1/2/2025     Reviewed symptom management over the past 24 hours: Morphine 2 mg/hr for dyspnea/pain. Ativan IVP x2 for agitation.      PPS: 10%    Patient remains eligible for general inpatient hospice care for symptom management of dyspnea/pain requiring frequent nursing assessments and interventions including titration of IV medications. POC discussed with Soheila GORDILLO and son. All in agreement. [facility] will continue to support the patient and family.    Jing Pimentel RN, BSN PN  Transitional Nurse Liaison  [facility]  [phone] (After-hours)

## 2025-01-03 NOTE — PLAN OF CARE
Problem: SKIN/TISSUE INTEGRITY - ADULT  Goal: Skin integrity remains intact  Description: INTERVENTIONS  - Assess and document risk factors for pressure ulcer development  - Assess and document skin integrity  - Monitor for areas of redness and/or skin breakdown  - Initiate interventions, skin care algorithm/standards of care as needed  Outcome: Progressing  Goal: Incision(s), wounds(s) or drain site(s) healing without S/S of infection  Description: INTERVENTIONS:  - Assess and document risk factors for pressure ulcer development  - Assess and document skin integrity  - Assess and document dressing/incision, wound bed, drain sites and surrounding tissue  - Implement wound care per orders  - Initiate isolation precautions as appropriate  - Initiate Pressure Ulcer prevention bundle as indicated  Outcome: Progressing  Goal: Oral mucous membranes remain intact  Description: INTERVENTIONS  - Assess oral mucosa and hygiene practices  - Implement preventative oral hygiene regimen  - Implement oral medicated treatments as ordered  Outcome: Progressing     Problem: PAIN - ADULT  Goal: Verbalizes/displays adequate comfort level or patient's stated pain goal  Description: INTERVENTIONS:  - Encourage pt to monitor pain and request assistance  - Assess pain using appropriate pain scale  - Administer analgesics based on type and severity of pain and evaluate response  - Implement non-pharmacological measures as appropriate and evaluate response  - Consider cultural and social influences on pain and pain management  - Manage/alleviate anxiety  - Utilize distraction and/or relaxation techniques  - Monitor for opioid side effects  - Notify MD/LIP if interventions unsuccessful or patient reports new pain  - Anticipate increased pain with activity and pre-medicate as appropriate  Outcome: Progressing

## 2025-01-03 NOTE — PROGRESS NOTES
Pt kept comfortable overnight. Remains on morphine gtt and received Ativan IVP x1. Frequent oral care provided, repositioned through the night. Kept dry and comfortable. Safety precautions maintained.

## 2025-01-04 NOTE — PROGRESS NOTES
[facility]  part of [facility]    Progress Note    Amy Wagoner Patient Status:  Inpatient    1925 MRN [id number]   Location [facility]5W Attending Janny Monaco MD   Hosp Day # 4 PCP PHYSICIAN NONSTAFF     Chief Complaint:   hospice    Subjective:     Patient seen this morning, Hypotensive and hypothermic  Continues with Hospice care. On morphine gtt.       Objective:   Blood pressure 91/51, pulse 88, temperature (!) 96 °F (35.6 °C), temperature source Axillary, resp. rate 16, SpO2 92%.  Physical Exam    General: Frail and ill-appearing lethargic   Cardiac: S1-S2 appreciated  Lungs: Good air entry bilaterally   Abdomen: Soft nontender nondistended positive bowel sounds    Results:   Lab Results   Component Value Date    WBC 6.0 2024    HGB 9.3 (L) 2024    HCT 30.4 (L) 2024    .0 2024    CREATSERUM 1.91 (H) 2024    BUN 44 (H) 2024     2024    K 4.5 2024     2024    CO2 22.0 2024    GLU 89 2024    CA 8.9 2024    ALB 4.0 2024    ALKPHO 135 2024    BILT 0.2 2024    TP 7.5 2024    AST 21 2024    ALT 9 (L) 2024    INR 0.93 2018    T4F 0.8 2024    TSH 8.500 (H) 2024    LIP 71 (L) 06/10/2022     (H) 2018    ESRML 69 (H) 2022    MG 1.9 2022    PHOS 2.7 2022    TROPHS 10 06/10/2022    B12 807 2018       No results found.        Assessment & Plan:       Acute CVA (cerebrovascular accident)   Influenza bronchitis with bronchospasm   Hospice care  -Patient appears comfortable  -Continue scopolamine patch for secretions  -Continue morphine for pain and air hunger  -Continue Ativan as needed for agitation     Heel Wound- cont dressing changes per family request       CODE status: DNR/Comfort        MDM: Moderate   I personally spent time on chart/note review, review of labs/imaging, discussion with  patient, physical exam, discussion with staff, consultants, coordinating care, writing progress note, and discussion of plan of care.       Herminia Rose M.D.

## 2025-01-04 NOTE — HOSPICE RN NOTE
[facility] Inpatient Nursing Rounds:     Assessed patient at bedside, family present, patient is unresponsive to verbal and tactile stimulus. Scopolamine patch in place. RR 8 with several apneic pauses, shallow, moaning with each exhale, labored with accessory muscle use. Lung sounds diminished with rhonchi. Room air. Skin pale, cool to touch, clenched bilateral fists, dusky nailbeds, mottling to bilateral feet and to lips. Bilateral pitting edema to upper and lower extremities. LBM: 1/2. Bowel sounds hypoactive in all quadrants. Foster intact draining yellow cloudy urine. Bed confined.  PPS: 10 %    Morphine drip running at 2 mg/hr at time of assessment, spoke with floor RAND Winslow regarding available prn dose of IVP Morphine and titration of Morphine gtt to help ease labored breathing. RN in agreement with this plan. In the last 24 hours PRN medications given include: IV Lorazepam, 1 mg x2.     Patient remains eligible for general inpatient hospice care for symptom management of pain/dyspnea/agitation/airway secretions requiring frequent nursing assessments and interventions including titration of IV medications per Dr. Herminia Rose and Dr. Jaylen Barnes. POC discussed with son, RADHA, RAND Winslow and MD Rose. All in agreement. [facility] will continue to follow this patient closely for end of life while supporting patient and family.     @1825 Hospice RN Second Rounds: POC to address dyspnea and agitation reviewed with RAND Winslow who is in agreement with   IVP 1mg MS dose now as labored breathing continues, moaning with each exhalation. RR 6, shallow, labored, irregular [place]yn[place]- Edwards pattern.       Hannah Mccabe RN, BSN-CHPN  Transitional Nurse Liaison  [facility]  [phone] (After-hours)

## 2025-01-04 NOTE — PLAN OF CARE
Morphine drip at 2mL, one push given during the shift, frequent rounding     Problem: SKIN/TISSUE INTEGRITY - ADULT  Goal: Skin integrity remains intact  Description: INTERVENTIONS  - Assess and document risk factors for pressure ulcer development  - Assess and document skin integrity  - Monitor for areas of redness and/or skin breakdown  - Initiate interventions, skin care algorithm/standards of care as needed  Outcome: Progressing  Goal: Incision(s), wounds(s) or drain site(s) healing without S/S of infection  Description: INTERVENTIONS:  - Assess and document risk factors for pressure ulcer development  - Assess and document skin integrity  - Assess and document dressing/incision, wound bed, drain sites and surrounding tissue  - Implement wound care per orders  - Initiate isolation precautions as appropriate  - Initiate Pressure Ulcer prevention bundle as indicated  Outcome: Progressing  Goal: Oral mucous membranes remain intact  Description: INTERVENTIONS  - Assess oral mucosa and hygiene practices  - Implement preventative oral hygiene regimen  - Implement oral medicated treatments as ordered  Outcome: Progressing     Problem: PAIN - ADULT  Goal: Verbalizes/displays adequate comfort level or patient's stated pain goal  Description: INTERVENTIONS:  - Encourage pt to monitor pain and request assistance  - Assess pain using appropriate pain scale  - Administer analgesics based on type and severity of pain and evaluate response  - Implement non-pharmacological measures as appropriate and evaluate response  - Consider cultural and social influences on pain and pain management  - Manage/alleviate anxiety  - Utilize distraction and/or relaxation techniques  - Monitor for opioid side effects  - Notify MD/LIP if interventions unsuccessful or patient reports new pain  - Anticipate increased pain with activity and pre-medicate as appropriate  Outcome: Progressing

## 2025-01-05 NOTE — SIGNIFICANT EVENT
[facility]  part of [facility]    Death/Expiration note    Amy Wagoner Patient Status:  Inpatient    1925 MRN [id number]   Location:  [place] [address] Attending Herminia Rose MD   Hosp Day # 5 PCP PHYSICIAN NONSTAFF         Date and Time of Death:  2025 1753     Preliminary Cause of Death:  Hospice  Cardiac Death     Primary Medical Condition/Diagnosis:  Present on Admission:  **None**        Family notified:   daughter and son  Yes

## 2025-01-05 NOTE — DISCHARGE SUMMARY
[facility]  part of [facility]    Death Discharge Summary    Amy Wagoner Patient Status:  Inpatient    1925 MRN [id number]   Location [facility]5W Attending No att. providers found   Hosp Day # 5 PCP PHYSICIAN NONSTAFF     Date of Admission: 2024   Date of Discharge: 2025  4:26 PM    Admitting Diagnosis: cva  Acute CVA (cerebrovascular accident) (HCC)    Disposition:     Discharge Diagnosis: .Active Problems:    Acute CVA (cerebrovascular accident) (HCC)      Hospital Course:   Reason for Admission: Influenza bronchitis and generalized weakness.       Discharge Physical Exam:   Death exam per nursing staff.   No spontaneous movements. No response to verbal or tactile stimuli. No breath sounds. No heart sounds.       Hospital Course:     The patient was a 89-year-old  female w/hx of CKD4, HTN, who was brought in to the emergency department for evaluation of progressive weakness, cough, and shortness of breath with wheezing. Viral panel positive for influenza.  She was admitted and started on influenza treatment with tamiflu and steroids. On day 3 of hospitalization, expressed not wanting anymore treatment for medical issues. On day 4 of hospitalization  stoke alert called for staring spell with left hemiparesis. CT brain with age-indeterminate right basal ganglia ischemia and CTA head and neck cannot be done due to renal function.  EEG showed diffuse slowing.  Hospice was consulted per patient and family request. Patient was then treated as inpatient hospice. She eventually  on 25 at 1447.       Complications: None      Discharge Plan:   Discharge Condition:     Discharge Medication List as of 2025  4:56 PM                                                             Discharge Diet: N/A    Discharge Activity: N/A    Follow up:         >30 minutes spent on discharge orders, coordination, and planning.     Herminia Rose  MD  1/5/2025

## 2025-01-05 NOTE — HOSPICE RN NOTE
[facility] RN notified by RAND Núñez of patient's natural death. Reported TOD 1447. Hospice RN visited with son Kenneth via telephone to offer condolences and bereavement resources/services. Family stated understanding. [facility] notified via telephone @ # [phone] spoke with Belinda and provided all necessary information. [facility] team is available for family support and further resource education.    Hannah Mccabe RN, BSN-CHPN  Transitional Nurse Liaison  [facility]  [phone] (After-hours)

## (undated) DEVICE — BANDAGE ROLL,100% COTTON, 6 PLY, SMALL: Brand: KERLIX

## (undated) DEVICE — SYRINGE 50ML LL TIP

## (undated) DEVICE — 3.0MM NEURO (MATCH HEAD) SOFT TOUCH

## (undated) DEVICE — 1200CC GUARDIAN II: Brand: GUARDIAN

## (undated) DEVICE — SUTURE VICRYL 2-0 CT-2

## (undated) DEVICE — SOL  .9 1000ML BTL

## (undated) DEVICE — 3M™ RED DOT™ MONITORING ELECTRODE WITH FOAM TAPE AND STICKY GEL, 50/BAG, 20/CASE, 72/PLT 2570: Brand: RED DOT™

## (undated) DEVICE — 3.0MM PRECISION ROUND

## (undated) DEVICE — LAMINECTOMY CDS: Brand: MEDLINE INDUSTRIES, INC.

## (undated) DEVICE — FLOSEAL HEMOSTATIC MATRIX, 5ML: Brand: FLOSEAL HEMOSTATIC MATRIX

## (undated) DEVICE — GAUZE SPONGES,12 PLY: Brand: CURITY

## (undated) DEVICE — REM POLYHESIVE ADULT PATIENT RETURN ELECTRODE: Brand: VALLEYLAB

## (undated) DEVICE — COTTON UNDERCAST PADDING,REGULAR FINISH: Brand: WEBRIL

## (undated) DEVICE — FILTERLINE NASAL ADULT O2/CO2

## (undated) DEVICE — HOWELL D.A.S.H. SPHINCTEROTOME: Brand: D.A.S.H.

## (undated) DEVICE — STERILE TETRA-FLEX CF, ELASTIC BANDAGE, 4" X 5.5YD: Brand: TETRA-FLEX™CF

## (undated) DEVICE — LIGHT HANDLE

## (undated) DEVICE — DISPOSABLE TOURNIQUET CUFF DUAL BLADDER, DUAL PORT AND QUICK CONNECT CONNECTOR: Brand: COLOR CUFF

## (undated) DEVICE — SUT VICRYL 3-0 SH J416H

## (undated) DEVICE — SUCTION CANISTER, 3000CC,SAFELINER: Brand: DEROYAL

## (undated) DEVICE — Device

## (undated) DEVICE — SUTURE VICRYL 3-0 RB-1

## (undated) DEVICE — SUT ETHILON 4-0 PS-2 1667G

## (undated) DEVICE — Device: Brand: DEFENDO AIR/WATER/SUCTION AND BIOPSY VALVE

## (undated) DEVICE — SOLUTION  .9 3000ML

## (undated) DEVICE — WIREGUIDED RETRIEVAL BASKET: Brand: TRAPEZOID RX

## (undated) DEVICE — SUT VICRYL 2-0 FS-1 J443H

## (undated) DEVICE — GLOVE BIOGEL M SURG SZ 71/2

## (undated) DEVICE — RETRIEVAL BALLOON CATHETER: Brand: EXTRACTOR™ PRO XL

## (undated) DEVICE — GLOVE SURG TRIUMPH SZ 8

## (undated) DEVICE — 3M(TM) MEDIPORE(TM) +PAD SOFT CLOTH ADHESIVE WOUND DRESSING 3569: Brand: 3M™ MEDIPORE™

## (undated) DEVICE — TRANSPOSAL ULTRAFLEX DUO/QUAD ULTRA CART MANIFOLD

## (undated) DEVICE — UNDYED BRAIDED (POLYGLACTIN 910), SYNTHETIC ABSORBABLE SUTURE: Brand: COATED VICRYL

## (undated) DEVICE — KIT COLLECTION COPAN ESWAB 1ML

## (undated) DEVICE — SYRINGE 10ML LL TIP

## (undated) DEVICE — ENDOSCOPY PACK UPPER: Brand: MEDLINE INDUSTRIES, INC.

## (undated) DEVICE — DRAPE MICROSCOPE NEURO PENTERO

## (undated) DEVICE — SUPER SPONGES,MEDIUM: Brand: KERLIX

## (undated) DEVICE — TRAY SKIN PREP PVP-1

## (undated) DEVICE — 4.0MM PRECISION ROUND

## (undated) DEVICE — LOWER EXTREMITY: Brand: MEDLINE INDUSTRIES, INC.

## (undated) DEVICE — SUTURE VICRYL 0 CT-1

## (undated) DEVICE — NON-ADHERENT STRIPS,OIL EMULSION: Brand: CURITY

## (undated) DEVICE — KENDALL SCD EXPRESS SLEEVES, THIGH LENGTH, MEDIUM: Brand: KENDALL SCD

## (undated) DEVICE — "MH-438 A/W VLVE F/140 EVIS-140": Brand: AIR/WATER VALVE

## (undated) DEVICE — 3M™ IOBAN™ 2 ANTIMICROBIAL INCISE DRAPE 6650EZ: Brand: IOBAN™ 2

## (undated) DEVICE — GAMMEX® PI HYBRID SIZE 6.5, STERILE POWDER-FREE SURGICAL GLOVE, POLYISOPRENE AND NEOPRENE BLEND: Brand: GAMMEX

## (undated) DEVICE — SOLUTION  .9 1000ML BTL

## (undated) DEVICE — SOLUTION ANSEP 70% ISOPRPNL

## (undated) DEVICE — BOWLS UTILITY 16OZ

## (undated) NOTE — IP AVS SNAPSHOT
[facility]            (For Outpatient Use Only) Initial Admit Date: 2022   Inpt/Obs Admit Date: Inpt: 22 / Obs: N/A   Discharge Date:    Sanjay Fermin:  [de-identified]   MRN: [de-identified]   CSN: [id number]   CEID: [id number]        ENCOUNTER  Patient Class: Inpatient Admitting Provider: Yang Levy MD Unit: [address] [place] [address] 5SW/SE   Hospital Service: Medical Attending Provider: Jennie Anguiano MD   Bed: 548-A   Visit Type:   Referring Physician: No ref. provider found Billing Flag:    Admit Diagnosis: Urinary retention [R33.9]      PATIENT  Legal Name:   Chanda Norris   Legal Sex: Female  Gender ID:              [address] [facility] Name:    PCP:   Home: [phone]   Address:  [address] : 1925 (89 yrs) Mobile: No mobile phone on file. City/[facility] [address] Marital:  Language: [address] [place] [address] Drive: [facility] SSN4: [id number] Advent: Wishes Privacy     Race: White Ethnicity: Non  Or  O*   EMERGENCY CONTACT   Name Relationship Legal Guardian? Home Phone Work Phone Mobile Phone   1. DR. KIM IBARRA  2.  Diamond Learn  Daughter   No       [phone]     GUARANTOR  Guarantor: Jewel Gordon : 1925 Home Phone: [phone]   Address: [address] [place] [address]  Sex: Female Work Phone:    City/[facility]: Mikael Tenorio levi 084   Rel. to Patient: Self Guarantor ID: [id number]   GUARANTOR EMPLOYER   Employer:  Status: RETIRED     COVERAGE  PRIMARY INSURANCE   Payor: MEDICARE Plan: MEDICARE PART A&B   Group Number:  Insurance Type: INDEMNITY   Subscriber Name: Yobany Cruz : 1925   Subscriber ID: [id number] Pt Rel to Subscriber: Self   SECONDARY INSURANCE   Payor: [address] [facility]: AAR   Group Number: [id number] Insurance Type: INDEMNITY   Subscriber Name: Yobany Cruz : 1925   Subscriber ID: [id number] Pt Rel to Subscriber: SELF   TERTIARY INSURANCE   Payor:  Plan:    Group Number:  Insurance Type:    Subscriber Name: Subscriber :    Subscriber ID:  Pt Rel to Subscriber:    Hospital Account Financial Class: Medicare    2022

## (undated) NOTE — IP AVS SNAPSHOT
Patient Demographics     Address  [address] [place] [id number] Phone  [phone] [facility])      Emergency Contact(s)     Name Relation Home Work [facility].  Minh Scruggs Son   [phone]    Cally Winston Daughter   [phone]      Allergies as the incision. Call the surgeon if you have any of these symptoms. Your dressing was removed by the physician today. The incision is closed with steri strips and does not appear to be draining.   You may leave it open to air as long as it remains dry an endorphins that are a natural body chemical that can decrease pain. Some examples of relaxation techniques are deep breathing, listening to music or meditating. ? Use Cold therapy (polar care) for 20-30 minutes multiple times per day.   Be sure there is a Specialty:  Physician Assistant  Why:  F/u appt 10/9 at 2 pm  (NEUROSURGERY)  Contact information:  [address] [facility]  [phone]             Laxmi Atwood MD.    Specialties:  Internal Medicine, IP Consult to Primary [id number] - MAR ACTION REPORT  (last 24 hrs)    ** SITE UNKNOWN **     Order ID Medication Name Action Time Action Reason Comments    [id number] Acetaminophen-Codeine #3 (TYLENOL #3) 300-30 MG tab 1 tablet 09/28/18 1420 Given      [id number] AmLODIPine Bes Order Status:  Completed Lab Status:  Final result Updated:  09/22/18 0701    Specimen:  Urine, clean catch      Urine Culture 10,000 - 50,000 CFU/ML Escherichia coli    Susceptibility      Escherichia coli     Not Specified    Ampicillin <=2  Sensitive couple of days[TT.1]. Will have to consider neuromuscular disease also. Can await neuro consult[TT. 3]    #2. Hypertension on appropriate medications. Somewhat more accelerated likely secondary to hospitalization will continue current meds      #3.   DCI Again the fall was recent but she did not injure herself she has been having some increase in falls over the past couple of months.     History:[TT.1]  Past Medical History:  No date: Essential hypertension  No date: Hearing impairment      Comment:  kerri lymphadenopathy. Musculoskeletal: Some low-grade arthritis in her hands please see hip replacement right side questionable instability when she walks that left knee.     Neurological: Negative for confusion, headaches, dizziness, seizures, memory problem No results for input(s): NA, K, CL, CO2, BUN, CREATSERUM, CA, CAION, MG, PHOS, GLU in the last 168 hours. No results for input(s): ALT, AST, ALB, AMYLASE, LIPASE, LDH in the last 168 hours.     Invalid input(s): ALPHOS, TBIL, DBIL, TPROT      No results #2.  Hypertension on appropriate medications. Somewhat more accelerated likely secondary to hospitalization will continue current meds      #3. 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   Her breast exa Past Medical History:  No date: Essential hypertension  No date: Hearing impairment      Comment:  bilat Hearing aids  Past Surgical History:  No date: LUMPECTOMY LEFT  No date: MASTECTOMY RIGHT  No date: MASTECTOMY,SIMPLE  No date: OOPHORECTOMY  No date: knee.    Neurological: Negative for confusion, headaches, dizziness, seizures, memory problems, trouble swallowing, speech problems.   Does use walker but it increasingly much more limited than before      Physical Exam:[TT.1]      Temp:  [97.4 °F (36.3 °C) Invalid input(s): ALPHOS, TBIL, DBIL, TPROT      No results for input(s): TROP in the last 168 hours. [TT.2]    Kehinde Ramon  9/20/2018  1:38 PM[TT.1]    Electronically signed by Jon Arias MD on 9/20/2018  1:48 PM   Attribution Key    TT. 1 - Tyrr chronic mid/low back pain over past few weeks.      MRI T and L Spine 9/20/18 CONCLUSION:    1. Incomplete examination. The patient was not able to tolerate completing thoracic and lumbar sets.   2.  High-grade central canal stenosis T11-T12, T12-L1, L3-L Metoclopramide HCl (REGLAN) injection 5 mg 5 mg Intravenous Q6H PRN   diphenhydrAMINE (BENADRYL) cap/tab 25 mg 25 mg Oral Q4H PRN   Or      DiphenhydrAMINE HCl (BENADRYL) injection 25 mg 25 mg Intravenous Q4H PRN   0.9 % NaCl 1000ml with KCl 20 mEq infusio HGB 9.0 09/27/2018    HCT 27.5 09/27/2018    .0 09/27/2018       Review of Systems:  ROS as listed in HPI   Other 10 point review of systems within normal limits.     OBJECTIVE:    Blood pressure 119/61, pulse 79, temperature 97.7 °F (36.5 °C), temp heel to shin testing is normal bilaterally. Psychiatric: Awake, alert and oriented x 3. Able to register and recall 3/3                                       items. Normal recent and remote memory. Would be happy to reassess should medical and/or functional status change. Will follow. Thank you for the consult. Gavin Montelongo MD  [facility]. [DD.1]        Electronically signed by Marion Clements MD on 9/27/2018 Take 1 tablet (500 mg total) by mouth every 6 (six) hours as needed.   Qty: 40 tablet Refills: 0    Senna 17.2 MG Oral Tab  prn  Qty: 20 tablet Refills: 0  Associated Diagnoses:Spinal stenosis of lumbar region with neurogenic claudication    Acetaminophen-C elevated liver enzymes. Workup did include common duct stone she was taken for ERCP successfully without complications prior to her back surgery and had no further upper back pain.                                        3.  Renal insufficiency grade 3 at l Integument: No lesions. No erythema. No clinical DVT IV sites look good  Psychiatric: Appropriate mood and affect. Disposition: Laila Seth    Discharge Condition: Stable          Other Discharge Instructions:  Instructed patient if she develops stiff 9/24/2018: ENDOSCOPIC ULTRASOUND (EUS); N/A      Comment:  Performed by Lex Stephens MD at [facility] [place]OSCOPY  9/26/2018: LUMBAR MICRODISCECTOMY 2 LEVEL; N/A      Comment:  Performed by Danielle Núñez MD at [facility] [place]  No date: LUMPECTOMY LEFT  No date: M Assistive Device: Rolling walker  Pattern: Shuffle  Stoop/Curb Assistance: Not tested  Comment : declined AFOs    Skilled Therapy Provided: pt sitting on commode when entered room. Pt sit to stand from commode with min A.  On 1st attempt, pt was unable to a Physical Therapy Note signed by Nena Raza PT at 9/27/2018 11:29 AM  Version 1 of 1    Author:  Nena Raza PT Service:  Rehab Author Type:  Physical Therapist    Filed:  9/27/2018 11:29 AM Date of Service:  9/27/2018 11:03 AM Status No date: OOPHORECTOMY  No date: THYROIDECTOMY      Comment:  partial aboy 40y. ago  No date: TOTAL HIP REPLACEMENT; Right    HOME SITUATION  Type of Home: House   Home Layout: Two level;Ramped entrance  Stairs to Enter :  2(berny, has ramped entry)  Railing ACTIVITY TOLERANCE[KW. 1]  Room air  No shortness of breath  Blood Pressure: 117/81[KW. 2]    AM-PAC '6-Clicks' INPATIENT SHORT FORM - BASIC MOBILITY  How much difficulty does the patient currently have. ..  -   Turning over in bed (including adjusting bedclo present. Pt ambulates with excessive kyphosis, bilateral steppage gait pattern due to bilateral foot drop and narrow SHERRIE. Pt given VC for posture and walker use. Pt requests to sit due to fatigue.  Pt motivated to ambulate back to room after short rest corine function. [KW.1] Subacute rehab is recommended.  After this period of rehabilitation patient should achieve MOD I level in transfers and short distance ambulation.[KW.2]  DISCHARGE RECOMMENDATIONS  PT Discharge Recommendations: Sub-acute rehabilitation    PL BR.1 - Zunilda Palmer, PTA on 9/26/2018 11:26 AM  BR. 2 - Alejandra Nogueira, PTA on 9/26/2018 11:29 AM               Physical Therapy Note signed by Alejandra Nogueira PTA at 9/26/2018 11:27 AM  Version 1 of 2    Author:  MJ Barajasma Bhupinder CKD (chronic kidney disease) stage 3, GFR 30-59 ml/min (HCC)    Peripheral polyneuropathy    DCIS (ductal carcinoma in situ)    Cholelithiasis with choledocholithiasis      Past Medical History  Past Medical History:   Diagnosis Date   • Essential hypert Approx Degree of Impairment: 53.32%  Standardized Score (AM-PAC Scale): 35.96  CMS Modifier (G-Code): CK    FUNCTIONAL TRANSFER ASSESSMENT  Supine to Sit : Minimum assistance  Sit to Stand: Minimum assistance    Skilled Therapy Provided: Pt seen semi supin simplification techniques;ADL training;Functional transfer training;UE strengthening/ROM; Endurance training;Patient/Family education;Patient/Family training;Equipment eval/education; Compensatory technique education  Rehab Potential : Good  Frequency (Obs): Spinal stenosis of lumbar region with neurogenic claudication    CKD (chronic kidney disease) stage 3, GFR 30-59 ml/min (HCC)    Peripheral polyneuropathy    DCIS (ductal carcinoma in situ)    Cholelithiasis with choledocholithiasis[SH.2]      Past Medic Patient self-stated goal is to go home from the hospital instead of to rehab, to walk household distances, to be able to continue living in her home alone, to be able to go to her [employer] show in October.     OBJECTIVE[SH.1]  Precautions: Spine;Limb alert - Sit to Stand: Minimum assistance[SH.2]    Skilled Therapy Provided: Pt seen seated on bedside commode. Max (A) toileting. Sit to stand min (A) via RW. Chair transfer min (A) via RW.  Pt initially declining further mobility due to fear of falling, agreeable Client Assessment/Performance Deficits LOW - No comorbidities nor modifications of tasks    Clinical Decision Making LOW - Analysis of occupational profile, problem-focused assessments, limited treatment options    Overall Complexity LOW[SH.1]     OT Disch

## (undated) NOTE — LETTER
[address], [place]  Authorization for Invasive Procedures  1. I hereby authorize Dr. González Vasques, my physician and whomever may be designated as the doctor's assistant, to perform the following operation and/or procedure:  right leg angiogram, possible angioplasty, stenting and all indiated procedures on Sergio Leonard at [facility].    2. My physician has explained to me the nature and purpose of the operation or other procedure, possible alternative methods of treatment, the risks involved and the possibility of complications to me. I understand the probable consequences of declining the recommended procedure and the alternative methods of treatment. I acknowledge that no guarantee has been made as to the result that may be obtained. 3. I recognize that during the course of this operation or other procedure, unforeseen conditions may necessitate additional or different procedures than those listed above. I, therefore, further authorize and request that the above-named physician, his/her physician assistants, or designees perform such procedures as are, in his/her professional opinion, necessary and desirable. If I have a Do Not Attempt Resuscitation (DNAR) order in place, that status will be suspended while in the operating room, procedural suite, and during the recovery period unless otherwise explicitly stated by me (or a person authorized to consent on my behalf). The surgeon or my attending physician will determine when the applicable recovery period ends for purposes of reinstating the DNAR order. 4. Should the need arise during my operation or immediate post-operative period; I also consent to the administration of blood and/or blood products.  Further, I understand that despite careful testing and screening of blood and blood products, I may still be subject to ill effects as a result of recieving a blood transfusion an/or blood producst. The following are some, but not all, of the potential risks that can occur: fever and allergic reactions, hemolytic reactions, transmission of disease such as hepatitis, AIDS, cytomegalovirus (CMV), and flluid overload. In the event that I wish to have autologous transfusions of my own blood, or a directed donor transfusion, I will discuss this with my physician. 5. I consent to the photographing of the operations or procedures to be performed for the purposes of advancing medicine, science, and/or education, provided my identity is not revealed. If the procedure has been videotaped, the physician/surgeon will obtain the original videotape. [facility] will not be responsible for storage or maintenance of this tape. 6. I consent to the presence of a  or observer as deemed necessary by my physician or his designee. 7. Any tissues or organs removed in the operation or other procedure may be disposed of by and at the discretion of [facility].    8. I understand that the physician and his/her physician assistants may not be employees or agents of [facility], [facility], nor [facility], but are independent medical practitioners who have been permitted to use its facilities for the care and treatment of their patients. 9. Patients having a sterilization procedure: I understand that if the procedure is successful the results will be permanent and it will therefore be impossible for me to inseminate, conceive or bear children. I also understand that the procedure is intended to result in sterility, although the result has not been guaranteed. 10. I CERTIFY THAT I HAVE READ AND FULLY UNDERSTAND THE ABOVE CONSENT TO OPERATION and/or OTHER PROCEDURE. 11. I acknowledge that my physician has explained sedation/analgesia administration to me including the risks and benefits.  I consent to the administration of sedation/analgesia as may be necessary or desirable in the judgment of my physician. Signature of Patient:  ________________________________________________ Date: _________Time: _________    Responsible person in case of minor or unconscious: _____________________________Relationship: ____________     Witness Signature: ____________________________________________ Date: __________ Time: ___________    Statement of Physician  My signature below affirms that prior to the time of the procedure, I have explained to the patient and/or her legal representative, the risks and benefits involved in the proposed treatment and any reasonable alternative to the proposed treatment. I have also explained the risks and benefits involved in the refusal of the proposed treatment and have answered the patient's questions. If I have a significant financial interest in this procedure/surgery, I have disclosed this and had a discussion with my patient.     Signature of Physician:   ________________________________________Date: _________Time:_______ Patient Name: Dimitri Chu  : 1925   Printed: 2022    Medical Record #: [id number]

## (undated) NOTE — LETTER
Kecia Randhawa [address]    CONSENT FOR EXAMINATION AND DISPOSITION OF AMPUTATED MEMBERS      I have already consented to the amputation of my _______________________________                                                                                    (Please print)    To be performed by: ____________________________________ on _______________. (Please print)     I hereby godwin permission to [facility], to perform a pathological examination of the amputated member. After examination the specimen is to be:       [   ] Disposed of as determined by the authorities of [address] [place] [address];     [   ] Delivered to ___________________________________________________________                             (Name and address of Undertaker)                              ________________________________________  __________________                            (Patient Signature)                                                            (Date)    If the patient is a minor or subject to a guardianship, I have signed my name below on behalf of the patient and myself.         Name of patient's parent or legal guardian:   ______________________________________                                                                            (Please print Name of Legal Guardian)                                                                            ______________________________________                                                                           (Parent or Legal Guardian Signature/Date)      I  have witnessed the signature on this form: ______________________________________                                                                           (Please print Name of Witness)                                                                            _____________________________________ (Witness Signature/Date)     Patient Name: Sergio Leonard     : 1925                 Printed: 2022      Medical Record #: [id number]                                              Page 1 of 1

## (undated) NOTE — LETTER
Roxana Castelan 182 6 [address]  Luann, [address] [place] [address]    Consent for Operation  Date: __________________                                Time: _______________    1.  I authorize the performance upon Jeni Ferguson the following operation:  Procedluther medical, scientific, or educational purposes, provided my identity is not revealed by the pictures or by descriptive texts accompanying them. If the procedure has been videotaped, the surgeon will obtain the original videotape.  The hospital will not be res I CERTIFY THAT I HAVE READ AND UNDERSTAND THE ABOVE CONSENT TO OPERATION, THAT MY DOCTOR PROVIDED ME WITH THE ABOVE EXPLANATIONS, THAT ALL BLANKS OR STATEMENTS REQUIRING INSERTION OR COMPLETION WERE FILLED IN.     Signature of Patient:   ___________________ ii. The last time that I ate or drank.  iii. All of the medicines I take (including prescriptions, herbal supplements, and pills I can buy without a prescription (including street drugs/illegal medications).  Failure to inform my anesthesiologist about thes _____________________________________________________________________________  Anesthesiologist Signature     Date   Time  I have discussed the procedure and information above with the patient (or patient’s representative) and answered their questions.  The

## (undated) NOTE — LETTER
[address], [place]  Authorization for Invasive Procedures  1. I hereby authorize Dr. Leo Boswell , my physician and whomever may be designated as the doctor's assistant, to perform the following operation and/or procedure: Right foot amputation of 4th and 5th toes  on Nitin Delgado at [facility].    2. My physician has explained to me the nature and purpose of the operation or other procedure, possible alternative methods of treatment, the risks involved and the possibility of complications to me. I understand the probable consequences of declining the recommended procedure and the alternative methods of treatment. I acknowledge that no guarantee has been made as to the result that may be obtained. 3. I recognize that during the course of this operation or other procedure, unforeseen conditions may necessitate additional or different procedures than those listed above. I, therefore, further authorize and request that the above-named physician, his/her physician assistants, or designees perform such procedures as are, in his/her professional opinion, necessary and desirable. If I have a Do Not Attempt Resuscitation (DNAR) order in place, that status will be suspended while in the operating room, procedural suite, and during the recovery period unless otherwise explicitly stated by me (or a person authorized to consent on my behalf). The surgeon or my attending physician will determine when the applicable recovery period ends for purposes of reinstating the DNAR order. 4. Should the need arise during my operation or immediate post-operative period; I also consent to the administration of blood and/or blood products.  Further, I understand that despite careful testing and screening of blood and blood products, I may still be subject to ill effects as a result of recieving a blood transfusion an/or blood producst. The following are some, but not all, of the potential risks that can occur: fever and allergic reactions, hemolytic reactions, transmission of disease such as hepatitis, AIDS, cytomegalovirus (CMV), and flluid overload. In the event that I wish to have autologous transfusions of my own blood, or a directed donor transfusion, I will discuss this with my physician. 5. I consent to the photographing of the operations or procedures to be performed for the purposes of advancing medicine, science, and/or education, provided my identity is not revealed. If the procedure has been videotaped, the physician/surgeon will obtain the original videotape. The [facility] will not be responsible for storage or maintenance of this tape. 6. I consent to the presence of a  or observer as deemed necessary by my physician or his designee. 7. Any tissues or organs removed in the operation or other procedure may be disposed of by and at the discretion of [facility].    8. I understand that the physician and his/her physician assistants may not be employees or agents of [facility], [facility], nor [facility], but are independent medical practitioners who have been permitted to use its facilities for the care and treatment of their patients. 9. Patients having a sterilization procedure: I understand that if the procedure is successful the results will be permanent and it will therefore be impossible for me to inseminate, conceive or bear children. I also understand that the procedure is intended to result in sterility, although the result has not been guaranteed. 10. I CERTIFY THAT I HAVE READ AND FULLY UNDERSTAND THE ABOVE CONSENT TO OPERATION and/or OTHER PROCEDURE. 11. I acknowledge that my physician has explained sedation/analgesia administration to me including the risks and benefits.  I consent to the administration of sedation/analgesia as may be necessary or desirable in the judgment of my physician. Signature of Patient:  ________________________________________________ Date: _________Time: _________    Responsible person in case of minor or unconscious: _____________________________Relationship: ____________     Witness Signature: ____________________________________________ Date: __________ Time: ___________    Statement of Physician  My signature below affirms that prior to the time of the procedure, I have explained to the patient and/or her legal representative, the risks and benefits involved in the proposed treatment and any reasonable alternative to the proposed treatment. I have also explained the risks and benefits involved in the refusal of the proposed treatment and have answered the patient's questions. If I have a significant financial interest in this procedure/surgery, I have disclosed this and had a discussion with my patient.     Signature of Physician:   ________________________________________Date: _________Time:_______ Patient Name: Norma March  : 1925   Printed: 2022    Medical Record #: [id number]

## (undated) NOTE — IP AVS SNAPSHOT
[facility]            (For Outpatient Use Only) Initial Admit Date: 6/10/2022   Inpt/Obs Admit Date: Inpt: 6/10/22 / Obs: N/A   Discharge Date:    Candace Lanes:  [de-identified]   MRN: [de-identified]   CSN: [id number]   CEID: [id number]        ENCOUNTER  Patient Class: Inpatient Admitting Provider: Sixto Faulkner MD Unit: [address] Service: Medical Attending Provider: Alcides Lopez MD   Bed: 325-A   Visit Type:   Referring Physician: No ref. provider found Billing Flag:    Admit Diagnosis: Cellulitis of right lower extremity [U06.578]      PATIENT  Legal Name:   Adebayo Monge   Legal Sex: Female  Gender ID:              [address] [facility] Name:    PCP:   Home: [phone]   Address:  [address] : 1925 (89 yrs) Mobile: No mobile phone on file. City/State/[facility] [address] Marital:  Language: [address] [place] [address] Drive: [facility] SSN4: [id number] Muslim: Wishes Privacy     Race: White Ethnicity: Non  Or  O*   EMERGENCY CONTACT   Name Relationship Legal Guardian? Home Phone Work Phone Mobile Phone   1. DR. KIM IBARRA  2.  Verónica Churchill  Daughter   No       [phone]     GUARANTOR  Guarantor: Maximo Aquino : 1925 Home Phone: [phone]   Address: [address] [place] [address]  Sex: Female Work Phone:    City/State/Zip: [place] [address]   Rel. to Patient: Self Guarantor ID: [id number]   GUARANTOR EMPLOYER   Employer:  Status: RETIRED     COVERAGE  PRIMARY INSURANCE   Payor: MEDICARE Plan: MEDICARE PART A&B   Group Number:  Insurance Type: INDEMNITY   Subscriber Name: Gena Delgadillo : 1925   Subscriber ID: [id number] Pt Rel to Subscriber: Self   SECONDARY INSURANCE   Payor: [address] [facility] [address]: [facility]   Group Number: [id number] Insurance Type: INDEMNITY   Subscriber Name: Gena Delgadillo : 1925   Subscriber ID: [id number] Pt Rel to Subscriber: SELF   TERTIARY INSURANCE   Payor:  Plan:    Group Number:  Insurance Type:    Subscriber Name:  Fátima Morochojenny :    Subscriber ID:  Pt Rel to Subscriber:    Hospital Account Financial Class: Medicare    2022

## (undated) NOTE — LETTER
[facility]  Angeles Frederick 61 [address] [facility], [address] [place] [address]    Consent for Operation    Date: __________________    Time: _______________    1.  I authorize the performance upon Shira Whitmore the following operation:    Procedure(s):       2. I authorize videotape. The [facility] will not be responsible for storage or maintenance of this tape. 6. For the purpose of advancing medical education, I consent to the admittance of observers to the Operating Room.     7. I authorize the use of any specimen, organs Signature of Patient:   ___________________________    When the patient is a minor or mentally incompetent to give consent:  Signature of person authorized to consent for patient: ___________________________   Relationship to patient: _____________________ drugs/illegal medications). Failure to inform my anesthesiologist about these medicines may increase my risk of anesthetic complications. · If I am allergic to anything or have had a reaction to anesthesia before.     3. I understand how the anesthesia med I have discussed the procedure and information above with the patient (or patient’s representative) and answered their questions. The patient or their representative has agreed to have anesthesia services.     _______________________________________________

## (undated) NOTE — LETTER
[facility]  Angeles Frederick 61 [address] [facility], [address] [place] [address]    Consent for Operation    Date: __________________    Time: _______________    1.  I authorize the performance upon Mylene Morrow the following operation:    Procedure(s):  ENDOSCOPIC ULTRASOU procedure has been videotaped, the surgeon will obtain the original videotape. The hospital will not be responsible for storage or maintenance of this tape.     6. For the purpose of advancing medical education, I consent to the admittance of observers to t STATEMENTS REQUIRING INSERTION OR COMPLETION WERE FILLED IN.     Signature of Patient:   ___________________________    When the patient is a minor or mentally incompetent to give consent:  Signature of person authorized to consent for patient: ____________ supplements, and pills I can buy without a prescription (including street drugs/illegal medications). Failure to inform my anesthesiologist about these medicines may increase my risk of anesthetic complications.   · If I am allergic to anything or have had Anesthesiologist Signature     Date   Time  I have discussed the procedure and information above with the patient (or patient’s representative) and answered their questions. The patient or their representative has agreed to have anesthesia services.     ___

## (undated) NOTE — LETTER
[facility]  Angeles Frederick 61 [address] [facility], [address] [place] [address]    Consent for Operation    Date: __________________    Time: _______________    1.  I authorize the performance upon Elida Merida the following operation:    Procedure(s):Bilateral Lumbar 3- L procedure has been videotaped, the surgeon will obtain the original videotape. The hospital will not be responsible for storage or maintenance of this tape.     6. For the purpose of advancing medical education, I consent to the admittance of observers to t STATEMENTS REQUIRING INSERTION OR COMPLETION WERE FILLED IN.     Signature of Patient:   ___________________________    When the patient is a minor or mentally incompetent to give consent:  Signature of person authorized to consent for patient: ____________ supplements, and pills I can buy without a prescription (including street drugs/illegal medications). Failure to inform my anesthesiologist about these medicines may increase my risk of anesthetic complications.   · If I am allergic to anything or have had Anesthesiologist Signature     Date   Time  I have discussed the procedure and information above with the patient (or patient’s representative) and answered their questions. The patient or their representative has agreed to have anesthesia services.     ___

## (undated) NOTE — ED AVS SNAPSHOT
[facility] AND [facility] in [address] [facility].  [address]    Phone:  [phone]    Fax:  [address] [facility]   MRN: [id number]    Department:  [facility] in [place]   Date of Visit: Discharge References/Attachments     HEMORRHOIDS (ENGLISH)    RECTAL PROLAPSE (ENGLISH)    CONSTIPATION (ADULT) (ENGLISH)      Disclosure     Insurance plans vary and the physician(s) referred by the Immediate Care may not be covered by your plan.   It is p Registration line at [phone] or find a doctor online by visiting [email or web address].    IF THERE IS ANY CHANGE OR WORSENING OF YOUR CONDITION, CALL YOUR PRIMARY CARE PHYSICIAN AT ONCE OR GO TO [address] [place] [address].     If you have been prescribed a - If you are a smoker or have smoked in the last 12 months, we encourage you to explore options for quitting.     - If you have concerns related to behavioral health issues or thoughts of harming yourself, contact [facility] a